# Patient Record
Sex: FEMALE | Race: WHITE | Employment: FULL TIME | URBAN - METROPOLITAN AREA
[De-identification: names, ages, dates, MRNs, and addresses within clinical notes are randomized per-mention and may not be internally consistent; named-entity substitution may affect disease eponyms.]

---

## 2017-02-27 LAB — TSH SERPL DL<=0.05 MIU/L-ACNC: 1.72 UIU/ML (ref 0.27–4.2)

## 2017-05-15 ENCOUNTER — HOSPITAL ENCOUNTER (OUTPATIENT)
Dept: OTHER | Age: 47
Discharge: HOME | End: 2017-05-15
Attending: INTERNAL MEDICINE | Admitting: INTERNAL MEDICINE

## 2017-05-15 DIAGNOSIS — R52 PAIN: ICD-10-CM

## 2017-05-22 ENCOUNTER — HOSPITAL ENCOUNTER (OUTPATIENT)
Dept: PHYSICAL THERAPY | Age: 47
Discharge: HOME | End: 2017-05-31
Admitting: INTERNAL MEDICINE

## 2017-05-22 ASSESSMENT — PAIN SCALES - GENERAL
PAINLEVEL_OUTOF10: 6
PAINLEVEL_OUTOF10: 6

## 2017-05-22 ASSESSMENT — PAIN DESCRIPTION - ONSET
ONSET: AWAKENED FROM SLEEP
ONSET: AWAKENED FROM SLEEP

## 2017-05-22 ASSESSMENT — PAIN DESCRIPTION - LOCATION
LOCATION: HEAD;SHOULDER;NECK;BACK
LOCATION: HEAD;SHOULDER;NECK;BACK

## 2017-05-22 ASSESSMENT — PAIN DESCRIPTION - PAIN TYPE
TYPE: CHRONIC PAIN
TYPE: CHRONIC PAIN

## 2017-05-22 ASSESSMENT — PAIN DESCRIPTION - DESCRIPTORS
DESCRIPTORS: ACHING;CONSTANT;SHOOTING
DESCRIPTORS: SHOOTING;CONSTANT;ACHING

## 2017-05-22 ASSESSMENT — PAIN DESCRIPTION - FREQUENCY
FREQUENCY: CONTINUOUS
FREQUENCY: CONTINUOUS

## 2017-05-22 ASSESSMENT — PAIN DESCRIPTION - PROGRESSION
CLINICAL_PROGRESSION: GRADUALLY IMPROVING
CLINICAL_PROGRESSION: GRADUALLY IMPROVING

## 2017-05-22 ASSESSMENT — PAIN DESCRIPTION - ORIENTATION
ORIENTATION: LEFT
ORIENTATION: LEFT

## 2019-04-08 ENCOUNTER — OFFICE VISIT (OUTPATIENT)
Dept: INTERNAL MEDICINE CLINIC | Age: 49
End: 2019-04-08
Payer: COMMERCIAL

## 2019-04-08 VITALS
SYSTOLIC BLOOD PRESSURE: 120 MMHG | DIASTOLIC BLOOD PRESSURE: 92 MMHG | BODY MASS INDEX: 25.01 KG/M2 | HEIGHT: 66 IN | WEIGHT: 155.6 LBS

## 2019-04-08 DIAGNOSIS — E55.9 VITAMIN D INSUFFICIENCY: ICD-10-CM

## 2019-04-08 DIAGNOSIS — Z00.00 WELL ADULT EXAM: ICD-10-CM

## 2019-04-08 DIAGNOSIS — G43.109 MIGRAINE WITH AURA AND WITHOUT STATUS MIGRAINOSUS, NOT INTRACTABLE: Primary | ICD-10-CM

## 2019-04-08 DIAGNOSIS — Z87.442 HISTORY OF KIDNEY STONES: ICD-10-CM

## 2019-04-08 DIAGNOSIS — J45.20 MILD INTERMITTENT ASTHMA WITHOUT COMPLICATION: ICD-10-CM

## 2019-04-08 DIAGNOSIS — Z12.83 SKIN CANCER SCREENING: ICD-10-CM

## 2019-04-08 DIAGNOSIS — K63.5 POLYP OF COLON, UNSPECIFIED PART OF COLON, UNSPECIFIED TYPE: ICD-10-CM

## 2019-04-08 DIAGNOSIS — J30.89 ENVIRONMENTAL AND SEASONAL ALLERGIES: ICD-10-CM

## 2019-04-08 LAB
BASOPHILS ABSOLUTE: 0 K/UL (ref 0–0.2)
BASOPHILS RELATIVE PERCENT: 0.5 %
EOSINOPHILS ABSOLUTE: 0.1 K/UL (ref 0–0.6)
EOSINOPHILS RELATIVE PERCENT: 1.4 %
HCT VFR BLD CALC: 44.2 % (ref 36–48)
HEMOGLOBIN: 14.4 G/DL (ref 12–16)
LYMPHOCYTES ABSOLUTE: 1.8 K/UL (ref 1–5.1)
LYMPHOCYTES RELATIVE PERCENT: 22.5 %
MCH RBC QN AUTO: 31.3 PG (ref 26–34)
MCHC RBC AUTO-ENTMCNC: 32.5 G/DL (ref 31–36)
MCV RBC AUTO: 96.3 FL (ref 80–100)
MONOCYTES ABSOLUTE: 0.4 K/UL (ref 0–1.3)
MONOCYTES RELATIVE PERCENT: 4.8 %
NEUTROPHILS ABSOLUTE: 5.5 K/UL (ref 1.7–7.7)
NEUTROPHILS RELATIVE PERCENT: 70.8 %
PDW BLD-RTO: 12.9 % (ref 12.4–15.4)
PLATELET # BLD: 319 K/UL (ref 135–450)
PMV BLD AUTO: 7.6 FL (ref 5–10.5)
RBC # BLD: 4.59 M/UL (ref 4–5.2)
WBC # BLD: 7.8 K/UL (ref 4–11)

## 2019-04-08 PROCEDURE — 99203 OFFICE O/P NEW LOW 30 MIN: CPT | Performed by: INTERNAL MEDICINE

## 2019-04-08 RX ORDER — ONDANSETRON 8 MG/1
8 TABLET, ORALLY DISINTEGRATING ORAL EVERY 8 HOURS PRN
Qty: 20 TABLET | Refills: 1 | Status: SHIPPED | OUTPATIENT
Start: 2019-04-08 | End: 2021-07-30 | Stop reason: SDUPTHER

## 2019-04-08 RX ORDER — IBUPROFEN 200 MG
200 TABLET ORAL EVERY 6 HOURS PRN
COMMUNITY

## 2019-04-08 RX ORDER — RIZATRIPTAN BENZOATE 5 MG/1
5 TABLET, ORALLY DISINTEGRATING ORAL
Qty: 30 TABLET | Refills: 3 | Status: SHIPPED | OUTPATIENT
Start: 2019-04-08 | End: 2020-08-19 | Stop reason: SDUPTHER

## 2019-04-08 RX ORDER — LEVOCETIRIZINE DIHYDROCHLORIDE 5 MG/1
5 TABLET, FILM COATED ORAL NIGHTLY
COMMUNITY

## 2019-04-08 ASSESSMENT — PATIENT HEALTH QUESTIONNAIRE - PHQ9
SUM OF ALL RESPONSES TO PHQ QUESTIONS 1-9: 0
SUM OF ALL RESPONSES TO PHQ9 QUESTIONS 1 & 2: 0
2. FEELING DOWN, DEPRESSED OR HOPELESS: 0
1. LITTLE INTEREST OR PLEASURE IN DOING THINGS: 0
SUM OF ALL RESPONSES TO PHQ QUESTIONS 1-9: 0

## 2019-04-08 ASSESSMENT — ENCOUNTER SYMPTOMS
DIARRHEA: 0
CONSTIPATION: 0
CHEST TIGHTNESS: 0
SHORTNESS OF BREATH: 0

## 2019-04-08 NOTE — ASSESSMENT & PLAN NOTE
Had improvement in her migraines with the use of propranolol but it did make her sleepy. She thinks she was on around 60 mg a day. Would be reluctant to use propranolol as she is getting allergy injections and has a history of asthma. She is currently getting chiropractic treatment which has helped her headaches. If she does not continue to improve, consider verapamil. Would avoid Topamax with history of kidney stones. Could also consider amitriptyline. May also benefit from referral to neurology to discuss possible Botox injections given the origin of her migraine seems to be cervical.  Trial of Maxalt MLT 5 mg p.r.n. and Zofran p.r.n. for nausea. Had side effects from Imitrex.

## 2019-04-08 NOTE — PATIENT INSTRUCTIONS
Trial of Riboflavin 50 mg at bedtime, Magnesium and calcium 400 mg, and Melatonin 3 mg at bedtime for prophylaxis

## 2019-04-08 NOTE — ASSESSMENT & PLAN NOTE
Has a Ventolin inhaler but uses very infrequently. Has not had asthma symptoms in \"a long time\". He needs to flare mildly with URIs.

## 2019-04-08 NOTE — PROGRESS NOTES
supple. Carotid bruit is not present. No thyroid mass and no thyromegaly present. Cardiovascular: Normal rate, regular rhythm, normal heart sounds and intact distal pulses. No murmur heard. Pulses:       Dorsalis pedis pulses are 2+ on the right side, and 2+ on the left side. No LE edema   Pulmonary/Chest: Effort normal. She has no wheezes. She has no rales. She exhibits no tenderness. Abdominal: Soft. Bowel sounds are normal. She exhibits no mass. There is no tenderness. Lymphadenopathy:     She has no cervical adenopathy. Neurological: She is alert and oriented to person, place, and time. Skin: Skin is warm and dry. No pallor. Psychiatric: She has a normal mood and affect. Her behavior is normal. Judgment and thought content normal.       ASSESSMENT/PLAN:    Problem List Items Addressed This Visit     Migraine with aura and without status migrainosus, not intractable - Primary     Had improvement in her migraines with the use of propranolol but it did make her sleepy. She thinks she was on around 60 mg a day. Would be reluctant to use propranolol as she is getting allergy injections and has a history of asthma. She is currently getting chiropractic treatment which has helped her headaches. If she does not continue to improve, consider verapamil. Would avoid Topamax with history of kidney stones. Could also consider amitriptyline. May also benefit from referral to neurology to discuss possible Botox injections given the origin of her migraine seems to be cervical.  Trial of Maxalt MLT 5 mg p.r.n. and Zofran p.r.n. for nausea. Had side effects from Imitrex.          Relevant Medications    Aspirin-Acetaminophen-Caffeine (EXCEDRIN PO)    ibuprofen (ADVIL;MOTRIN) 200 MG tablet    rizatriptan (MAXALT-MLT) 5 MG disintegrating tablet    ondansetron (ZOFRAN ODT) 8 MG TBDP disintegrating tablet    Environmental and seasonal allergies     Does follow with an allergist and gets monthly allergy

## 2019-04-09 LAB
A/G RATIO: 1.7 (ref 1.1–2.2)
ALBUMIN SERPL-MCNC: 4.6 G/DL (ref 3.4–5)
ALP BLD-CCNC: 49 U/L (ref 40–129)
ALT SERPL-CCNC: 20 U/L (ref 10–40)
ANION GAP SERPL CALCULATED.3IONS-SCNC: 12 MMOL/L (ref 3–16)
AST SERPL-CCNC: 16 U/L (ref 15–37)
BILIRUB SERPL-MCNC: <0.2 MG/DL (ref 0–1)
BUN BLDV-MCNC: 14 MG/DL (ref 7–20)
CALCIUM SERPL-MCNC: 9.5 MG/DL (ref 8.3–10.6)
CHLORIDE BLD-SCNC: 105 MMOL/L (ref 99–110)
CHOLESTEROL, TOTAL: 208 MG/DL (ref 0–199)
CO2: 25 MMOL/L (ref 21–32)
CREAT SERPL-MCNC: 0.8 MG/DL (ref 0.6–1.1)
GFR AFRICAN AMERICAN: >60
GFR NON-AFRICAN AMERICAN: >60
GLOBULIN: 2.7 G/DL
GLUCOSE BLD-MCNC: 93 MG/DL (ref 70–99)
HDLC SERPL-MCNC: 62 MG/DL (ref 40–60)
LDL CHOLESTEROL CALCULATED: 116 MG/DL
POTASSIUM SERPL-SCNC: 4.9 MMOL/L (ref 3.5–5.1)
SODIUM BLD-SCNC: 142 MMOL/L (ref 136–145)
TOTAL PROTEIN: 7.3 G/DL (ref 6.4–8.2)
TRIGL SERPL-MCNC: 152 MG/DL (ref 0–150)
TSH REFLEX: 1.76 UIU/ML (ref 0.27–4.2)
VITAMIN D 25-HYDROXY: 28.8 NG/ML
VLDLC SERPL CALC-MCNC: 30 MG/DL

## 2019-04-27 ENCOUNTER — HOSPITAL ENCOUNTER (OUTPATIENT)
Dept: WOMENS IMAGING | Age: 49
Discharge: HOME | End: 2019-04-27
Payer: COMMERCIAL

## 2019-04-27 DIAGNOSIS — Z12.39 BREAST CANCER SCREENING: ICD-10-CM

## 2019-04-27 PROCEDURE — 77063 BREAST TOMOSYNTHESIS BI: CPT

## 2019-04-29 ENCOUNTER — TELEPHONE (OUTPATIENT)
Dept: INTERNAL MEDICINE CLINIC | Age: 49
End: 2019-04-29

## 2019-04-29 ENCOUNTER — HOSPITAL ENCOUNTER (EMERGENCY)
Age: 49
Discharge: HOME | End: 2019-04-29
Payer: COMMERCIAL

## 2019-04-29 ENCOUNTER — APPOINTMENT (OUTPATIENT)
Dept: GENERAL RADIOLOGY | Age: 49
End: 2019-04-29
Payer: COMMERCIAL

## 2019-04-29 VITALS
DIASTOLIC BLOOD PRESSURE: 84 MMHG | TEMPERATURE: 98.1 F | HEART RATE: 89 BPM | SYSTOLIC BLOOD PRESSURE: 107 MMHG | RESPIRATION RATE: 16 BRPM | OXYGEN SATURATION: 97 %

## 2019-04-29 DIAGNOSIS — S93.402A SPRAIN OF LEFT ANKLE, UNSPECIFIED LIGAMENT, INITIAL ENCOUNTER: ICD-10-CM

## 2019-04-29 DIAGNOSIS — S80.02XA CONTUSION OF LEFT KNEE, INITIAL ENCOUNTER: ICD-10-CM

## 2019-04-29 DIAGNOSIS — R92.8 ABNORMAL MAMMOGRAM: Primary | ICD-10-CM
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
## 2019-04-29 PROCEDURE — 99283 EMERGENCY DEPT VISIT LOW MDM: CPT

## 2019-04-29 PROCEDURE — 6370000000 HC RX 637 (ALT 250 FOR IP): Performed by: PHYSICIAN ASSISTANT

## 2019-04-29 PROCEDURE — 73560 X-RAY EXAM OF KNEE 1 OR 2: CPT

## 2019-04-29 PROCEDURE — 73610 X-RAY EXAM OF ANKLE: CPT

## 2019-04-29 RX ORDER — NAPROXEN 250 MG/1
500 TABLET ORAL ONCE
Status: COMPLETED | OUTPATIENT
Start: 2019-04-29 | End: 2019-04-29

## 2019-04-29 RX ADMIN — NAPROXEN 500 MG: 250 TABLET ORAL at 08:02

## 2019-04-29 ASSESSMENT — ENCOUNTER SYMPTOMS
RESPIRATORY NEGATIVE: 1
VOMITING: 0
DIARRHEA: 0
COLOR CHANGE: 0
CONSTIPATION: 0
NAUSEA: 0
ABDOMINAL PAIN: 0
COUGH: 0
BACK PAIN: 0
SHORTNESS OF BREATH: 0

## 2019-04-29 ASSESSMENT — PAIN SCALES - GENERAL
PAINLEVEL_OUTOF10: 3
PAINLEVEL_OUTOF10: 3

## 2019-04-29 NOTE — ED NOTES
Patient reporting that she was walking out to garage, when she went down 2 steps and rolled left ankle, patient reporting that she heard a pop, and felt like she was going to vomit, then the pain kind of  down and applied ice to it, and swelling is down from ice. Patient medicated per MAR, and xrays of left ankle and left knee as ordered, will continue to monitor.       Paresh Singleton RN  [phone]

## 2019-04-29 NOTE — ED PROVIDER NOTES
of Systems   Constitutional: Negative for chills and fever. Respiratory: Negative. Negative for cough and shortness of breath. Cardiovascular: Negative. Negative for chest pain. Gastrointestinal: Negative for abdominal pain, constipation, diarrhea, nausea and vomiting. Genitourinary: Negative for difficulty urinating and dysuria. Musculoskeletal: Positive for arthralgias, joint swelling and myalgias. Negative for back pain, gait problem, neck pain and neck stiffness. Skin: Negative for color change, pallor, rash and wound. Neurological: Negative for dizziness, weakness, light-headedness and numbness. Positives and Pertinent negatives as per HPI. Except as noted abovein the ROS, all other systems were reviewed and negative.        PAST MEDICAL HISTORY     Past Medical History:   Diagnosis Date    Allergic rhinitis     Kidney stone     Migraine          SURGICAL HISTORY     Past Surgical History:   Procedure Laterality Date    HEMORRHOID SURGERY  2017    LITHOTRIPSY      x 2    OTHER SURGICAL HISTORY      anal fissure repair    PELVIC LAPAROSCOPY      endometriosis         CURRENTMEDICATIONS       Discharge Medication List as of 4/29/2019  9:02 AM      CONTINUE these medications which have NOT CHANGED    Details   levocetirizine (XYZAL) 5 MG tablet Take 5 mg by mouth nightlyHistorical Med      fluticasone (VERAMYST) 27.5 MCG/SPRAY nasal spray by Nasal route every morningHistorical Med      Levonorgest-Eth Estrad 91-Day (LEVONORGEST-ETH EST & ETH EST PO) Take by mouthHistorical Med      Aspirin-Acetaminophen-Caffeine (EXCEDRIN PO) Take by mouthHistorical Med      ibuprofen (ADVIL;MOTRIN) 200 MG tablet Take 200 mg by mouth every 6 hours as needed for PainHistorical Med      rizatriptan (MAXALT-MLT) 5 MG disintegrating tablet Take 1 tablet by mouth once as needed for Migraine May repeat in 2 hours if needed, Disp-30 tablet, R-3Normal      ondansetron (ZOFRAN ODT) 8 MG TBDP disintegrating tablet Place 1 tablet under the tongue every 8 hours as needed for Nausea or Vomiting, Disp-20 tablet, R-1Normal               ALLERGIES     Zithromax [azithromycin]    FAMILYHISTORY       Family History   Problem Relation Age of Onset    Diabetes Mother     High Blood Pressure Mother     High Blood Pressure Father     High Blood Pressure Maternal Grandfather     Diabetes Maternal Grandfather     Heart Disease Maternal Grandfather     Colon Cancer Paternal Grandmother         61    Heart Disease Maternal Cousin         pacemaker    Migraines Daughter           SOCIAL HISTORY       Social History     Socioeconomic History    Marital status:      Spouse name: None    Number of children: None    Years of education: None    Highest education level: None   Occupational History    None   Social Needs    Financial resource strain: None    Food insecurity:     Worry: None     Inability: None    Transportation needs:     Medical: None     Non-medical: None   Tobacco Use    Smoking status: Never Smoker    Smokeless tobacco: Never Used   Substance and Sexual Activity    Alcohol use: Yes     Comment: occasionallly    Drug use: Never    Sexual activity: None   Lifestyle    Physical activity:     Days per week: None     Minutes per session: None    Stress: None   Relationships    Social connections:     Talks on phone: None     Gets together: None     Attends Spiritism service: None     Active member of club or organization: None     Attends meetings of clubs or organizations: None     Relationship status: None    Intimate partner violence:     Fear of current or ex partner: None     Emotionally abused: None     Physically abused: None     Forced sexual activity: None   Other Topics Concern    None   Social History Narrative    None       SCREENINGS             PHYSICAL EXAM    (up to 7 for level 4, 8 or more for level 5)     ED Triage Vitals [04/29/19 0741]   BP Temp Temp Source Pulse Resp SpO2 Height Weight   103/71 97 °F (36.1 °C) Oral 80 16 97 % -- --       Physical Exam   Constitutional: She is oriented to person, place, and time. She appears well-developed and well-nourished. HENT:   Head: Normocephalic and atraumatic. Right Ear: External ear normal.   Left Ear: External ear normal.   Eyes: Right eye exhibits no discharge. Left eye exhibits no discharge. Neck: Normal range of motion. Neck supple. Pulmonary/Chest: Effort normal. No stridor. No respiratory distress. Musculoskeletal: Normal range of motion. Upon examination patient has what appears to be contusion to left anterior knee. Tenderness over the left anterior knee. Patellar tendon intact. Able to straight leg raise. No ligamentous or meniscal instability noted. No abnormal patellar glide/grind. No popliteal fossa tenderness. No ecchymosis, erythema or warmth. No abrasion or laceration. Proximal fibular tenderness. Tender to palpation over the left lateral anterior joint line. No lateral malleolus or medial malleolar tenderness. No Achilles tendon tenderness. There is tendon intact. Dorsalis pedis pulse and capillary refill brisk. Sensation intact to the medial and lateral aspects of distal toes. Full range of motion and strength. Gait deferred. Compartments soft. No edema, ecchymosis, erythema or warmth noted to the ankle. No abrasion or laceration. No rashes or lesions. Neurological: She is alert and oriented to person, place, and time. Skin: Skin is warm and dry. She is not diaphoretic. No erythema. No pallor. Psychiatric: She has a normal mood and affect. Her behavior is normal.       DIAGNOSTIC RESULTS   LABS:    Labs Reviewed - No data to display    All other labs were within normal range or not returned as of this dictation. EKG:  All EKG's are interpreted by the Emergency Department Physician who either signs orCo-signs this chart in the absence of a cardiologist.  Please see their note for interpretation of EKG. RADIOLOGY:   Non-plain film images such as CT, Ultrasound and MRI are read by the radiologist. Plain radiographic images are visualized andpreliminarily interpreted by the  ED Provider with the below findings:        Interpretation perthe Radiologist below, if available at the time of this note:    XR KNEE LEFT (1-2 VIEWS)   Final Result   No acute osseous abnormality appreciated of the left knee or left ankle. Follow-up can be obtained as clinically indicated. XR ANKLE LEFT (MIN 3 VIEWS)   Final Result   No acute osseous abnormality appreciated of the left knee or left ankle. Follow-up can be obtained as clinically indicated. No results found. PROCEDURES   Unless otherwise noted below, none     Procedures    CRITICAL CARE TIME   N/A    CONSULTS:  None      EMERGENCY DEPARTMENT COURSE and DIFFERENTIALDIAGNOSIS/MDM:   Vitals:    Vitals:    04/29/19 0741 04/29/19 0907   BP: 103/71 107/84   Pulse: 80 89   Resp: 16 16   Temp: 97 °F (36.1 °C) 98.1 °F (36.7 °C)   TempSrc: Oral Oral   SpO2: 97% 97%       Patient was given thefollowing medications:  Medications   naproxen (NAPROSYN) tablet 500 mg (500 mg Oral Given 4/29/19 0802)       Patient is a 26-year-old female who presents to the ED with complaint of a fall. Have follow with associated left ankle and left knee injury. Given Naprosyn here in the ED for symptom control. X-rays of the left knee and left ankle showed no acute abnormality. Given history and physical examination suffering from mechanical fall from trip/slip with associated contusion and ankle sprain. Given air cast.  Patient instructed to ice, elevate and rest the area as much as possible. Follow-up with orthopedics. Return ED for any worsening symptoms. Instructed on over-the-counter medication for symptom control at home.   Low solution for acute fracture, dislocation, septic arthritis, gout, DVT, arterial occlusion, tendon involvement, nerve involvement, vascular compromise, cellulitis, abscess, compartment syndrome, proximal fibular fracture, Lisfranc injury, Charcot foot, Achilles tendon rupture or other emergent etiology at this time. FINAL IMPRESSION      1. Fall from slip, trip, or stumble, initial encounter    2. Contusion of left knee, initial encounter    3.  Sprain of left ankle, unspecified ligament, initial encounter          DISPOSITION/PLAN   DISPOSITION Decision To Discharge 04/29/2019 08:59:17 AM      PATIENT REFERREDTO:  Luz Marina Salter DO  [address] [address]  [phone]    Go to   As needed, If symptoms worsen    [facility] Emergency Department  [address] [facility]  [phone]  Go to   As needed, If symptoms worsen    Joe Ford MD  [address] [place] [address]  [phone]    Schedule an appointment as soon as possible for a visit   As needed, If symptoms worsen      DISCHARGE MEDICATIONS:  Discharge Medication List as of 4/29/2019  9:02 AM          DISCONTINUED MEDICATIONS:  Discharge Medication List as of 4/29/2019  9:02 AM                 (Please note that portions ofthis note were completed with a voice recognition program.  Efforts were made to edit the dictations but occasionally words are mis-transcribed.)    MACARENA [place] (electronically signed)          MACARENA Suárez  04/30/19 6818

## 2019-04-29 NOTE — TELEPHONE ENCOUNTER
Called and reviewed her abn mammogram. Is scheduled to have [address] on Friday.  Order for [address] is already in [address] saw

## 2019-04-29 NOTE — ED NOTES
Air cast applied, and patient ambulatory at time of discharge.       Octavia Montes RN  04/29/19 6057

## 2019-05-03 ENCOUNTER — HOSPITAL ENCOUNTER (OUTPATIENT)
Dept: ULTRASOUND IMAGING | Age: 49
Discharge: HOME | End: 2019-05-03
Payer: COMMERCIAL

## 2019-05-03 DIAGNOSIS — R92.8 ABNORMAL FINDING ON MAMMOGRAPHY: ICD-10-CM

## 2019-05-03 PROCEDURE — 76642 ULTRASOUND BREAST LIMITED: CPT

## 2019-05-08 PROBLEM — Z12.83 SKIN CANCER SCREENING: Status: RESOLVED | Noted: 2019-04-08 | Resolved: 2019-05-08

## 2019-06-07 ENCOUNTER — OFFICE VISIT (OUTPATIENT)
Dept: INTERNAL MEDICINE CLINIC | Age: 49
End: 2019-06-07
Payer: COMMERCIAL

## 2019-06-07 VITALS
SYSTOLIC BLOOD PRESSURE: 146 MMHG | HEART RATE: 102 BPM | OXYGEN SATURATION: 99 % | WEIGHT: 153.6 LBS | DIASTOLIC BLOOD PRESSURE: 100 MMHG | BODY MASS INDEX: 25.17 KG/M2

## 2019-06-07 DIAGNOSIS — G43.919: ICD-10-CM

## 2019-06-07 DIAGNOSIS — G43.109 MIGRAINE WITH AURA AND WITHOUT STATUS MIGRAINOSUS, NOT INTRACTABLE: Primary | ICD-10-CM

## 2019-06-07 PROCEDURE — 99214 OFFICE O/P EST MOD 30 MIN: CPT | Performed by: INTERNAL MEDICINE

## 2019-06-07 PROCEDURE — 96372 THER/PROPH/DIAG INJ SC/IM: CPT | Performed by: INTERNAL MEDICINE

## 2019-06-07 RX ORDER — KETOROLAC TROMETHAMINE 30 MG/ML
60 INJECTION, SOLUTION INTRAMUSCULAR; INTRAVENOUS ONCE
Status: COMPLETED | OUTPATIENT
Start: 2019-06-07 | End: 2019-06-07

## 2019-06-07 RX ORDER — METHYLPREDNISOLONE ACETATE 80 MG/ML
80 INJECTION, SUSPENSION INTRA-ARTICULAR; INTRALESIONAL; INTRAMUSCULAR; SOFT TISSUE ONCE
Status: COMPLETED | OUTPATIENT
Start: 2019-06-07 | End: 2019-06-07

## 2019-06-07 RX ADMIN — METHYLPREDNISOLONE ACETATE 80 MG: 80 INJECTION, SUSPENSION INTRA-ARTICULAR; INTRALESIONAL; INTRAMUSCULAR; SOFT TISSUE at 16:49

## 2019-06-07 RX ADMIN — KETOROLAC TROMETHAMINE 60 MG: 30 INJECTION, SOLUTION INTRAMUSCULAR; INTRAVENOUS at 16:47

## 2019-06-07 NOTE — PROGRESS NOTES
Patient: Flori Young is a 51 y.o. female who presents today with the following Chief Complaint(s):  Chief Complaint   Patient presents with    Migraine     since Sunday        HPI      did drive her to her appointment today. Here today for migraine. Has been going on since Sunday. Had to leave work on Monday- lost her vision in her left visual field. Has had similar symptoms but never this severe. Is very tired, has not been sleeping. Has had to leave work early. Had been going to the chiropractor weekly and then every other week. Had to cancel her appointment last week d/t work (was due on Wednesday 5/29/19) but did see him Wednesday 6/5/19, adjusted well but only partially worked on her headache. Will be seeing him again next Wednesday. Sunday 6/2 woke up with HA- took Maxalt and added ice. Did help. Went back to sleep and woke up and HA was dull. May have needed 2 doses of Maxalt after 2 hours. Monday 6/3 woke up with a dull HA (typical). Started feeling \"funny\" going to work, developed tunnel vision and then had complete loss of left visual field that resolved in less than 1 hour. HA was the same sharp occipital pain. Did leave work ( picked her up), took Maxalt  and Zofran at home and used ice and went to sleep. Tuesday 6/4- woke up at 1:30 am with HA, not as bad. Adjusted her pillow, took Tylenol, peppermint oil, and ice. Took Maxalt at 2:30 AM and used more ice. Woke up at 6 am, showered and did go to work ([employer] [facility]). Did feel mentally slower, decreased concentration. Longer to complete her tasks. Drank a lot of water. Was in a meeting at 1 pm, started with slight migraine, tried drinking a Coke Zero. Was able to finish her work day. Went out to dinner with her  that night and there was a child crying at [facility] and that made her migraine worse, took Maxalt and ice when she got home.     Wednesday 6/5- woke up around 3 am, iced/Maxalt/peppermint oil. Noticed the thunderstorm and wondered if that was triggering her headaches. Woke up at 6 am, felt better with dull HA, mild nausea. Able to work. Adjusted by DC at 5:30 pm. By 9 pm, HA again with icing and Maxalt. Thursday 6/6- woke up with HA at 12:30 am- ice/peppermint oil; unable to sleep so took Maxalt around 1:20 am/ice; felt better by 5:45 am, kept lights low. Did go to a job fair in [place] [address] and no AC. Did start having HA and vision issues around 2:30. Got home around 4pm, took more Maxalt, ice, rest.     Friday 6/7- woke up 3:15 am - iced. Maxalt around 4 am. Went back to sleep. Stayed home today but has been working from home but on computer and phone. Just has a dull HA and decreased focus. Did see Dr. Kellie Cruz yesterday and had her stop her OCP's in case they were contributing her her migraines. Monday 6/3 went to work and had full blown loss of visual field. Has not had this since Monday but has noticed that her vision is a little more foggy (waxes and wanes) and near vision is more difficult. HA pain is either a sharp pain in her right occipital area. If she is not having the sharp pain, pain is dull, throbbing/pulsatoins in same occipital area and then will hurt behind her eyes. Maxalt will at least dull the pain, sometimes will take the pain away. No side effects with Maxalt. Does have some nausea but not with all of her headaches. HA are typically weekly, less severe, lasting 1-2 days. maxalt has been working well for her when she has these.      Allergies   Allergen Reactions    Zithromax [Azithromycin] Rash      Past Medical History:   Diagnosis Date    Allergic rhinitis     Kidney stone     Migraine       Past Surgical History:   Procedure Laterality Date    HEMORRHOID SURGERY  2017    LITHOTRIPSY      x 2    OTHER SURGICAL HISTORY      anal fissure repair    PELVIC LAPAROSCOPY      endometriosis      Social History     Socioeconomic History    Marital (ZOFRAN ODT) 8 MG TBDP disintegrating tablet Place 1 tablet under the tongue every 8 hours as needed for Nausea or Vomiting 20 tablet 1    Aspirin-Acetaminophen-Caffeine (EXCEDRIN PO) Take by mouth      ibuprofen (ADVIL;MOTRIN) 200 MG tablet Take 200 mg by mouth every 6 hours as needed for Pain      rizatriptan (MAXALT-MLT) 5 MG disintegrating tablet Take 1 tablet by mouth once as needed for Migraine May repeat in 2 hours if needed 30 tablet 3    Levonorgest-Eth Estrad 91-Day (LEVONORGEST-ETH EST & ETH EST PO) Take by mouth       No facility-administered medications prior to visit. Patient'spast medical history, surgical history, family history, medications,  and allergies  were all reviewed and updated as appropriate today. Review of Systems   Constitutional: Negative for fever. HENT: Negative for congestion, sinus pressure and sinus pain. Eyes: Positive for photophobia and visual disturbance. Gastrointestinal: Positive for nausea. Negative for constipation, diarrhea and vomiting. Musculoskeletal: Negative for gait problem, neck pain and neck stiffness. Neurological: Positive for headaches. Negative for syncope, speech difficulty, weakness, light-headedness and numbness. Psychiatric/Behavioral: Positive for confusion (slowing of thought). BP (!) 146/100   Pulse 102   Wt 153 lb 9.6 oz (69.7 kg)   SpO2 99%   BMI 25.17 kg/m²   Physical Exam   Constitutional: She is oriented to person, place, and time. She appears well-developed and well-nourished. She is cooperative. Non-toxic appearance. HENT:   Head: Normocephalic. Right Ear: Tympanic membrane, external ear and ear canal normal.   Left Ear: Tympanic membrane, external ear and ear canal normal.   Nose: Nose normal.   Mouth/Throat: Oropharynx is clear and moist and mucous membranes are normal.   Eyes: Pupils are equal, round, and reactive to light. Conjunctivae and EOM are normal.   Neck: Carotid bruit is not present.  No thyroid mass and no thyromegaly present. Cardiovascular: Normal rate, regular rhythm, normal heart sounds and intact distal pulses. No murmur heard. Pulses:       Dorsalis pedis pulses are 2+ on the right side, and 2+ on the left side. No LE edema   Pulmonary/Chest: Effort normal and breath sounds normal.   Lymphadenopathy:     She has no cervical adenopathy. Neurological: She is alert and oriented to person, place, and time. No cranial nerve deficit. She displays a negative Romberg sign. Coordination and gait normal.   Psychiatric: She has a normal mood and affect. ASSESSMENT/PLAN:    Problem List Items Addressed This Visit     Acute confusional migraine, refractory     Has had daily migraine x 6 days. Does get relief with Maxalt but then HA will recur several hours later raising concern for possible rebound component to migraine. Will give her Toradol 60 mg IM and DepoMedrol 80 mg IM in the office to try to stop the HA. Add Benadryl 25 mg at HS tonight to try to improve sleep. If HA recurs over weekend recommend going to ED if no relief with Maxalt as may benefit from IVF, Compazine tx. Relevant Medications    verapamil (CALAN SR) 120 MG extended release tablet    ketorolac (TORADOL) injection 60 mg (Completed)    Migraine with aura and without status migrainosus, not intractable - Primary     Patient does have weekly migraines lasting 1-2 days that have been responding well to Maxalt but has had 6 migraines in the past 6 days, responding to Maxalt but then recurring several hours later, rasing concern for rebound component to HA. Continue chiropractic care as adjustments seem to be helping. Discussed prophylactic treatment is indicated and discussed options:  Inderal worked well for her in the past but made her very sleepy, Topamax is contraindicated with her h/o kidney stones, she may have tried amitriptyline in the past (sounds familiar but is not sure); suggest trial of verapamil  mg QHS and consider Amivig. Will also refer to neurology. Relevant Medications    verapamil (CALAN SR) 120 MG extended release tablet    ketorolac (TORADOL) injection 60 mg (Completed)    Other Relevant Orders    Nany Kirk MD, Neurology, [facility]          Current Outpatient Medications   Medication Sig Dispense Refill    verapamil (CALAN SR) 120 MG extended release tablet Take 1 tablet by mouth daily 30 tablet 1    levocetirizine (XYZAL) 5 MG tablet Take 5 mg by mouth nightly      fluticasone (VERAMYST) 27.5 MCG/SPRAY nasal spray by Nasal route every morning      ondansetron (ZOFRAN ODT) 8 MG TBDP disintegrating tablet Place 1 tablet under the tongue every 8 hours as needed for Nausea or Vomiting 20 tablet 1    Aspirin-Acetaminophen-Caffeine (EXCEDRIN PO) Take by mouth      ibuprofen (ADVIL;MOTRIN) 200 MG tablet Take 200 mg by mouth every 6 hours as needed for Pain      rizatriptan (MAXALT-MLT) 5 MG disintegrating tablet Take 1 tablet by mouth once as needed for Migraine May repeat in 2 hours if needed 30 tablet 3     No current facility-administered medications for this visit. Return in about 3 weeks (around 6/28/2019).

## 2019-06-07 NOTE — LETTER
[facility] Internal Medicine  [address] [address]  Phone: [phone]  Fax: [phone]    [facility],         June 7, 2019     Patient: Isa Hernandez   YOB: 1970   Date of Visit: 6/7/2019       To Whom it May Concern:    Hilton Black was seen in my clinic on 6/7/2019. She may return to work on Kellie 10, 2019. If you have any questions or concerns, please don't hesitate to call.     Sincerely,         [facility], DO

## 2019-06-08 PROBLEM — G43.919: Status: ACTIVE | Noted: 2019-06-08

## 2019-06-08 ASSESSMENT — ENCOUNTER SYMPTOMS
SINUS PAIN: 0
SINUS PRESSURE: 0
VOMITING: 0
PHOTOPHOBIA: 1
DIARRHEA: 0
NAUSEA: 1
CONSTIPATION: 0

## 2019-06-08 NOTE — ASSESSMENT & PLAN NOTE
Has had daily migraine x 6 days. Does get relief with Maxalt but then HA will recur several hours later raising concern for possible rebound component to migraine. Will give her Toradol 60 mg IM and DepoMedrol 80 mg IM in the office to try to stop the HA. Add Benadryl 25 mg at HS tonight to try to improve sleep. If HA recurs over weekend recommend going to ED if no relief with Maxalt as may benefit from IVF, Compazine tx.

## 2019-06-08 NOTE — ASSESSMENT & PLAN NOTE
Patient does have weekly migraines lasting 1-2 days that have been responding well to Maxalt but has had 6 migraines in the past 6 days, responding to Maxalt but then recurring several hours later, rasing concern for rebound component to HA. Continue chiropractic care as adjustments seem to be helping. Discussed prophylactic treatment is indicated and discussed options: Inderal worked well for her in the past but made her very sleepy, Topamax is contraindicated with her h/o kidney stones, she may have tried amitriptyline in the past (sounds familiar but is not sure); suggest trial of verapamil  mg QHS and consider Amivig. Will also refer to neurology.

## 2019-06-11 ENCOUNTER — OFFICE VISIT (OUTPATIENT)
Dept: NEUROLOGY | Age: 49
End: 2019-06-11
Payer: COMMERCIAL

## 2019-06-11 VITALS
WEIGHT: 153 LBS | DIASTOLIC BLOOD PRESSURE: 101 MMHG | HEIGHT: 65 IN | BODY MASS INDEX: 25.49 KG/M2 | HEART RATE: 101 BPM | SYSTOLIC BLOOD PRESSURE: 147 MMHG

## 2019-06-11 DIAGNOSIS — G43.119 INTRACTABLE MIGRAINE WITH AURA WITHOUT STATUS MIGRAINOSUS: Primary | ICD-10-CM

## 2019-06-11 LAB
HPV COMMENT: NORMAL
HPV TYPE 16: NOT DETECTED
HPV TYPE 18: NOT DETECTED
HPVOH (OTHER TYPES): NOT DETECTED

## 2019-06-11 PROCEDURE — 99244 OFF/OP CNSLTJ NEW/EST MOD 40: CPT | Performed by: PSYCHIATRY & NEUROLOGY

## 2019-06-11 RX ORDER — AMITRIPTYLINE HYDROCHLORIDE 10 MG/1
TABLET, FILM COATED ORAL
Qty: 60 TABLET | Refills: 1 | Status: SHIPPED | OUTPATIENT
Start: 2019-06-11 | End: 2019-09-11 | Stop reason: SINTOL

## 2019-06-11 RX ORDER — OLOPATADINE HYDROCHLORIDE 1 MG/ML
1 SOLUTION/ DROPS OPHTHALMIC 2 TIMES DAILY PRN
COMMUNITY

## 2019-06-11 NOTE — PATIENT INSTRUCTIONS
Please call with any questions or concerns:   SSANJALI [facility]  @ [phone]. LAB RESULTS:  Please obtain any labs or diagnostic tests as discussed today. You should call the office to check the results. Please allow  3 to 7 days for us to get these results. MEDICATION LIST:  Please bring an accurate list of your medications to every visit. APPOINTMENT CONFIRMATION:  We will call you the day before your scheduled appointment to confirm. If we are unable to reach you, you MUST call back by the end of the day to confirm the appointment or we may be forced to cancel.

## 2019-06-11 NOTE — LETTER
[facility]  [address] [facility] [address]  Phone: [phone]  Fax: [address] [facility] [address], DO        June 11, 2019       Patient: Key Beltran   MR Number: [id number]   YOB: 1970   Date of Visit: 6/11/2019       Dear Dr. Michelle Sharp:    Thank you for the request for consultation for Kitty Torres to me for the evaluation of intractable headaches. . Below are the relevant portions of my assessment and plan of care. HISTORY OF PRESENT ILLNESS :    Kitty Torres is a 51 y.o. female who is referred by Dr. Michelle Sharp   History was obtained from patient  Patient was referred for evaluation of intractable headaches. Patient states that she had headaches when she was very young during school days but then they got better. However in the last few years they have come back and in the last few months they have been worse. Now she gets headaches almost once every week. They can last for several hours. Headaches are throbbing in nature. She has scintillating scotoma with severe headaches. There is a family history of headaches in her mother and grandmother  She had an episode of focal visual hemianopsia last week along with a scintillating scotoma when she had her severe headache. Bright light and loud noise make the headaches worse. No clear relieving factors. Patient was started on verapamil by her primary physician a few days ago. Patient has history of kidney stones.                                                REVIEW OF SYSTEMS    Constitutional:  []   Chills   [x]  Fatigue   []  Fevers   []  Malaise   []  Weight loss     [] Denies all of the above    Eyes:  []  Double vision   [x]  Blurry vision     [] Denies all of the above    Ears, nose, mouth, throat, and face:   [] Hearing loss    []   Hoarseness      []  Snoring    [x]  Tinnitus       [] Denies all of the above     Respiratory:   []  Cough    []  Shortness of breath [x] Denies all of the above     Cardiovascular:   []  Chest pain    []  Exertional chest pressure/discomfort           [] Palpitations    []  Syncope     [x] Denies all of the above    Gastrointestinal:   []  Abdominal pain   []  Constipation    []  Diarrhea    []   Dysphagia                      [x] Denies all of the above    Genitourinary:      []  Frequency   []  Hematuria     []  Urinary incontinence           [x] Denies all of the above     Hematologic/lymphatic:  []  Bleeding    [x]  Easy bruising   []  Anemia  [] Denies all of the above     Musculoskeletal:   [] Back pain       []  Myalgias    [x]  Neck pain           [] Denies all of the above    Neurological: As noted in HPI    Behavioral/Psych:   [] Anxiety    []  Depression     []  Mood swings     [x] Denies all of the above     Endocrine:   []  Temperature intolerance     [x] Fatigue      [] Denies all of the above     Allergic/Immunologic:   [] Hay fever    [x] Denies all of the above     Past Medical History:   Diagnosis Date    Allergic rhinitis     Kidney stone     Migraine      Family History   Problem Relation Age of Onset    Diabetes Mother     High Blood Pressure Mother     High Blood Pressure Father     High Blood Pressure Maternal Grandfather     Diabetes Maternal Grandfather     Heart Disease Maternal Grandfather     Colon Cancer Paternal Grandmother         61    Heart Disease Maternal Cousin         pacemaker    Migraines Daughter      Social History     Socioeconomic History    Marital status:      Spouse name: None    Number of children: None    Years of education: None    Highest education level: None   Occupational History    None   Social Needs    Financial resource strain: None    Food insecurity:     Worry: None     Inability: None    Transportation needs:     Medical: None     Non-medical: None   Tobacco Use    Smoking status: Never Smoker    Smokeless tobacco: Never Used   Substance and Sexual Activity

## 2019-06-11 NOTE — PROGRESS NOTES
NEUROLOGY CONSULTATION     Chief Complaint   Patient presents with    Migraine     Patient is here today to establish care for migraine headaches. Patient has a long hx of migraines. HISTORY OF PRESENT ILLNESS :    Lili Calzada is a 51 y.o. female who is referred by Dr. Apollo Clement   History was obtained from patient  Patient was referred for evaluation of intractable headaches. Patient states that she had headaches when she was very young during school days but then they got better. However in the last few years they have come back and in the last few months they have been worse. Now she gets headaches almost once every week. They can last for several hours. Headaches are throbbing in nature. She has scintillating scotoma with severe headaches. There is a family history of headaches in her mother and grandmother  She had an episode of focal visual hemianopsia last week along with a scintillating scotoma when she had her severe headache. Bright light and loud noise make the headaches worse. No clear relieving factors. Patient was started on verapamil by her primary physician a few days ago. Patient has history of kidney stones.                                                REVIEW OF SYSTEMS    Constitutional:  []   Chills   [x]  Fatigue   []  Fevers   []  Malaise   []  Weight loss     [] Denies all of the above    Eyes:  []  Double vision   [x]  Blurry vision     [] Denies all of the above    Ears, nose, mouth, throat, and face:   [] Hearing loss    []   Hoarseness      []  Snoring    [x]  Tinnitus       [] Denies all of the above     Respiratory:   []  Cough    []  Shortness of breath         [x] Denies all of the above     Cardiovascular:   []  Chest pain    []  Exertional chest pressure/discomfort           [] Palpitations    []  Syncope     [x] Denies all of the above    Gastrointestinal:   []  Abdominal pain   [] Constipation    []  Diarrhea    []   Dysphagia                      [x] Denies all of the above    Genitourinary:      []  Frequency   []  Hematuria     []  Urinary incontinence           [x] Denies all of the above     Hematologic/lymphatic:  []  Bleeding    [x]  Easy bruising   []  Anemia  [] Denies all of the above     Musculoskeletal:   [] Back pain       []  Myalgias    [x]  Neck pain           [] Denies all of the above    Neurological: As noted in HPI    Behavioral/Psych:   [] Anxiety    []  Depression     []  Mood swings     [x] Denies all of the above     Endocrine:   []  Temperature intolerance     [x] Fatigue      [] Denies all of the above     Allergic/Immunologic:   [] Hay fever    [x] Denies all of the above     Past Medical History:   Diagnosis Date    Allergic rhinitis     Kidney stone     Migraine      Family History   Problem Relation Age of Onset    Diabetes Mother     High Blood Pressure Mother     High Blood Pressure Father     High Blood Pressure Maternal Grandfather     Diabetes Maternal Grandfather     Heart Disease Maternal Grandfather     Colon Cancer Paternal Grandmother         61    Heart Disease Maternal Cousin         pacemaker    Migraines Daughter      Social History     Socioeconomic History    Marital status:      Spouse name: None    Number of children: None    Years of education: None    Highest education level: None   Occupational History    None   Social Needs    Financial resource strain: None    Food insecurity:     Worry: None     Inability: None    Transportation needs:     Medical: None     Non-medical: None   Tobacco Use    Smoking status: Never Smoker    Smokeless tobacco: Never Used   Substance and Sexual Activity    Alcohol use: Yes     Comment: occasionallly    Drug use: Never    Sexual activity: None   Lifestyle    Physical activity:     Days per week: None     Minutes per session: None    Stress: None   Relationships    Social connections:     Talks on phone: None     Gets together: None     Attends Jainism service: None     Active member of club or organization: None     Attends meetings of clubs or organizations: None     Relationship status: None    Intimate partner violence:     Fear of current or ex partner: None     Emotionally abused: None     Physically abused: None     Forced sexual activity: None   Other Topics Concern    None   Social History Narrative    None       PHYSICAL EXAMINATION:  BP (!) 147/101   Pulse 101   Ht 5' 5\" (1.651 m)   Wt 153 lb (69.4 kg)   BMI 25.46 kg/m²   Appearance: Well appearing, well nourished and in no distress  Mental Status Exam: Patient is alert, oriented to person, place and time. Recent and remote memory is normal  Fund of Knowledge is normal  Attention/concentration is normal.   Speech : No dysarthria  Language : No aphasia  Funduscopic Exam: sharp disc margins  Cranial Nerves:   II: Visual fields:  Full to confrontation  III: Pupils:  equal, round, reactive to light  III,IV,VI: Extra Ocular Movements are intact. No nystagmus  V: Facial sensation is intact to pin prick and light touch  VII: Facial strength and movements: intact and symmetric smile,cheek puffing and eyebrow elevation  VIII: Hearing:  Intact to finger rub bilaterally  IX: Palate  elevation is symmetric  XI: Shoulder shrug is intact  XII: Tongue movements are normal  Motor:  Muscle tone and bulk are normal.   Strength is symmetrical 5/5 in all four extremities. Sensory: Intact to light touch and  pin prick in all four extremities  Coordination:  Normal  Finger to Nose and Heel to Shin bilaterally    . Reflexes:  DTR +2 and symmetric bilaterally  Plantar response: Flexor bilaterally  Gait: Gait and station is normal. Patient can toe/ heel and tandem walk without difficulty  Romberg: negative  Vascular: No carotid bruit bilaterally        DATA:  LABS:  General Labs:    CBC:   Lab Results   Component Value Date    WBC 7.8 04/08/2019    RBC 4.59 04/08/2019    HGB 14.4 04/08/2019    HCT 44.2 04/08/2019    MCV 96.3 04/08/2019    MCH 31.3 04/08/2019    MCHC 32.5 04/08/2019    RDW 12.9 04/08/2019     04/08/2019    MPV 7.6 04/08/2019     BMP:    Lab Results   Component Value Date     04/08/2019    K 4.9 04/08/2019     04/08/2019    CO2 25 04/08/2019    BUN 14 04/08/2019    LABALBU 4.6 04/08/2019    CREATININE 0.8 04/08/2019    CALCIUM 9.5 04/08/2019    GFRAA >60 04/08/2019    LABGLOM >60 04/08/2019    GLUCOSE 93 04/08/2019       IMPRESSION :  Intractable migraine headache  Nonfocal neurological examination  History of kidney stones  Patient was unable to tolerate propranolol in the past      RECOMMENDATIONS :  Discussed with patient  We discussed about treatment options  Continue verapamil which has been started by her primary care physician  I will add low-dose amitriptyline  Side effects were discussed. Topamax was considered but would be relatively contraindicated because of her kidney stones. She will discuss this further with her urologist as well. If none of these measures work we can always consider calcitonin gene related peptide antibody drugs  I have recommended that she avoid chocolate and nitrite containing foods  I will see her back in 2 months        Please note a portion of this chart was generated using dragon dictation software. Although every effort was made to ensure the accuracy of this automated transcription, some errors in transcription may have occurred.

## 2019-06-24 ENCOUNTER — OFFICE VISIT (OUTPATIENT)
Dept: NEUROLOGY | Age: 49
End: 2019-06-24
Payer: COMMERCIAL

## 2019-06-24 VITALS
HEART RATE: 80 BPM | WEIGHT: 154 LBS | BODY MASS INDEX: 25.66 KG/M2 | SYSTOLIC BLOOD PRESSURE: 131 MMHG | DIASTOLIC BLOOD PRESSURE: 85 MMHG | HEIGHT: 65 IN

## 2019-06-24 DIAGNOSIS — G43.111 INTRACTABLE MIGRAINE WITH AURA WITH STATUS MIGRAINOSUS: Primary | ICD-10-CM

## 2019-06-24 PROCEDURE — 99214 OFFICE O/P EST MOD 30 MIN: CPT | Performed by: PSYCHIATRY & NEUROLOGY

## 2019-06-24 NOTE — PROGRESS NOTES
Elizabeth Xiong   Neurology followup    Subjective:   CC/HP  History was obtained from the patient. Patient has severe intractable migraine headaches. Interval history:   Patient was unable to tolerate the amitriptyline. She states that if she has significant dry mouth. It also has not been helping. Patient cannot take topiramate because of history of kidney stones. She was unable to tolerate propranolol. She is on calcium channel blockers without any help. Detailed history:  Patient states that she had headaches when she was very young during school days but then they got better. However in the last few years they have come back and in the last few months they have been worse. Now she gets headaches almost once every week. They can last for several hours. Headaches are throbbing in nature. She has scintillating scotoma with severe headaches. There is a family history of headaches in her mother and grandmother  She had an episode of focal visual hemianopsia last week along with a scintillating scotoma when she had her severe headache. Bright light and loud noise make the headaches worse. No clear relieving factors.       REVIEW OF SYSTEMS    Constitutional:  []   Chills   [x]  Fatigue   []  Fevers   []  Malaise   []  Weight loss     [] Denies all of the above    Respiratory:   []  Cough    []  Shortness of breath         [x] Denies all of the above     Cardiovascular:   []  Chest pain    []  Exertional chest pressure/discomfort           [] Palpitations    []  Syncope     [x] Denies all of the above        Past Medical History:   Diagnosis Date    Allergic rhinitis     Kidney stone     Migraine      Family History   Problem Relation Age of Onset    Diabetes Mother     High Blood Pressure Mother     High Blood Pressure Father     High Blood Pressure Maternal Grandfather     Diabetes Maternal Grandfather     Heart Disease Maternal Grandfather     Colon Cancer Paternal Grandmother         63 HCT 44.2 04/08/2019    MCV 96.3 04/08/2019    MCH 31.3 04/08/2019    MCHC 32.5 04/08/2019    RDW 12.9 04/08/2019     04/08/2019    MPV 7.6 04/08/2019     BMP:    Lab Results   Component Value Date     04/08/2019    K 4.9 04/08/2019     04/08/2019    CO2 25 04/08/2019    BUN 14 04/08/2019    LABALBU 4.6 04/08/2019    CREATININE 0.8 04/08/2019    CALCIUM 9.5 04/08/2019    GFRAA >60 04/08/2019    LABGLOM >60 04/08/2019    GLUCOSE 93 04/08/2019       Impression :  Intractable migraine headaches with status migrainosus  Patient has been tried on several prophylactic medications in the past which have not helped. She was unable to tolerate propranolol or amitriptyline. She cannot take Topamax because of the relative contraindication with kidney stones  Patient is currently on calcium channel blockers without any improvement  Patient is extremely frustrated because of the continued headaches    Plan :  Lengthy discussion with patient  Discussed about different medications for migraine  Talked about calcitonin gene related peptide antibody drugs  I will start her on Aimovig injections  Side effects were discussed. Patient had several questions about the different drugs and these were discussed  I have told her to stop the amitriptyline because of side effects  She will continue with the diet modifications  I will see her back in 3 months for follow-up  I will get a CT head to rule out any structural lesion  Time spent in the care of this patient today including lengthy discussions with patient  was 25 minutes          Please note a portion of  this chart was generated using dragon dictation software. Although every effort was made to ensure the accuracy of this automated transcription, some errors in transcription may have occurred.

## 2019-06-27 ENCOUNTER — TELEPHONE (OUTPATIENT)
Dept: INTERNAL MEDICINE CLINIC | Age: 49
End: 2019-06-27

## 2019-06-27 NOTE — TELEPHONE ENCOUNTER
Spoke with pt, I gave her the web site to put all of her information in and get her card for the copay card.

## 2019-07-07 ENCOUNTER — HOSPITAL ENCOUNTER (OUTPATIENT)
Dept: CT IMAGING | Age: 49
Discharge: HOME | End: 2019-07-07
Payer: COMMERCIAL

## 2019-07-07 DIAGNOSIS — G43.111 INTRACTABLE MIGRAINE WITH AURA WITH STATUS MIGRAINOSUS: ICD-10-CM

## 2019-07-07 PROCEDURE — 70450 CT HEAD/BRAIN W/O DYE: CPT

## 2019-08-13 DIAGNOSIS — G43.109 MIGRAINE WITH AURA AND WITHOUT STATUS MIGRAINOSUS, NOT INTRACTABLE: ICD-10-CM

## 2019-09-09 ENCOUNTER — HOSPITAL ENCOUNTER (OUTPATIENT)
Dept: CT IMAGING | Age: 49
Discharge: HOME | End: 2019-09-09
Payer: COMMERCIAL

## 2019-09-09 DIAGNOSIS — Z87.442 H/O URINARY STONE: ICD-10-CM

## 2019-09-09 DIAGNOSIS — R10.9 ABDOMINAL PAIN, UNSPECIFIED ABDOMINAL LOCATION: ICD-10-CM

## 2019-09-09 LAB — HCG(URINE) PREGNANCY TEST: NEGATIVE

## 2019-09-09 PROCEDURE — 74176 CT ABD & PELVIS W/O CONTRAST: CPT

## 2019-09-09 PROCEDURE — 84703 CHORIONIC GONADOTROPIN ASSAY: CPT

## 2019-09-11 ENCOUNTER — OFFICE VISIT (OUTPATIENT)
Dept: INTERNAL MEDICINE CLINIC | Age: 49
End: 2019-09-11
Payer: COMMERCIAL

## 2019-09-11 VITALS
BODY MASS INDEX: 25.89 KG/M2 | WEIGHT: 155.6 LBS | OXYGEN SATURATION: 98 % | SYSTOLIC BLOOD PRESSURE: 106 MMHG | DIASTOLIC BLOOD PRESSURE: 78 MMHG | HEART RATE: 100 BPM

## 2019-09-11 DIAGNOSIS — G43.109 MIGRAINE WITH AURA AND WITHOUT STATUS MIGRAINOSUS, NOT INTRACTABLE: ICD-10-CM

## 2019-09-11 DIAGNOSIS — R91.1 PULMONARY NODULE: ICD-10-CM

## 2019-09-11 DIAGNOSIS — Z00.00 WELL ADULT EXAM: Primary | ICD-10-CM

## 2019-09-11 PROCEDURE — 99213 OFFICE O/P EST LOW 20 MIN: CPT | Performed by: INTERNAL MEDICINE

## 2019-09-11 ASSESSMENT — ENCOUNTER SYMPTOMS
CHEST TIGHTNESS: 0
SHORTNESS OF BREATH: 0
CONSTIPATION: 0
DIARRHEA: 0

## 2019-09-11 NOTE — PROGRESS NOTES
file    Stress: Not on file   Relationships    Social connections:     Talks on phone: Not on file     Gets together: Not on file     Attends Islam service: Not on file     Active member of club or organization: Not on file     Attends meetings of clubs or organizations: Not on file     Relationship status: Not on file    Intimate partner violence:     Fear of current or ex partner: Not on file     Emotionally abused: Not on file     Physically abused: Not on file     Forced sexual activity: Not on file   Other Topics Concern    Not on file   Social History Narrative    Not on file     Family History   Problem Relation Age of Onset    Diabetes Mother     High Blood Pressure Mother     High Blood Pressure Father     High Blood Pressure Maternal Grandfather     Diabetes Maternal Grandfather     Heart Disease Maternal Grandfather     Colon Cancer Paternal Grandmother         61    Heart Disease Maternal Cousin         pacemaker    Migraines Daughter         Outpatient Medications Prior to Visit   Medication Sig Dispense Refill    verapamil (CALAN SR) 120 MG extended release tablet TAKE ONE TABLET BY MOUTH DAILY 90 tablet 3    Erenumab-aooe (AIMOVIG) 70 MG/ML SOAJ Inject 1 cc (70 mg) subcutaneously once every 30 days 4 pen 0    olopatadine (PATANOL) 0.1 % ophthalmic solution 1 drop 2 times daily      levocetirizine (XYZAL) 5 MG tablet Take 5 mg by mouth nightly      fluticasone (VERAMYST) 27.5 MCG/SPRAY nasal spray by Nasal route every morning      ondansetron (ZOFRAN ODT) 8 MG TBDP disintegrating tablet Place 1 tablet under the tongue every 8 hours as needed for Nausea or Vomiting 20 tablet 1    amitriptyline (ELAVIL) 10 MG tablet Take 2 tablets at night 60 tablet 1    Aspirin-Acetaminophen-Caffeine (EXCEDRIN PO) Take by mouth      ibuprofen (ADVIL;MOTRIN) 200 MG tablet Take 200 mg by mouth every 6 hours as needed for Pain      rizatriptan (MAXALT-MLT) 5 MG disintegrating tablet Take 1

## 2019-10-04 ENCOUNTER — OFFICE VISIT (OUTPATIENT)
Dept: NEUROLOGY | Age: 49
End: 2019-10-04
Payer: COMMERCIAL

## 2019-10-04 VITALS
SYSTOLIC BLOOD PRESSURE: 105 MMHG | WEIGHT: 152 LBS | DIASTOLIC BLOOD PRESSURE: 69 MMHG | HEART RATE: 80 BPM | BODY MASS INDEX: 25.29 KG/M2

## 2019-10-04 DIAGNOSIS — G43.909 NONINTRACTABLE CHRONIC MIGRAINE: Primary | ICD-10-CM

## 2019-10-04 PROCEDURE — 99213 OFFICE O/P EST LOW 20 MIN: CPT | Performed by: PSYCHIATRY & NEUROLOGY

## 2019-11-22 ENCOUNTER — OFFICE VISIT (OUTPATIENT)
Dept: INTERNAL MEDICINE CLINIC | Age: 49
End: 2019-11-22
Payer: COMMERCIAL

## 2019-11-22 VITALS
OXYGEN SATURATION: 98 % | DIASTOLIC BLOOD PRESSURE: 80 MMHG | SYSTOLIC BLOOD PRESSURE: 120 MMHG | HEART RATE: 84 BPM | BODY MASS INDEX: 25.79 KG/M2 | WEIGHT: 155 LBS

## 2019-11-22 DIAGNOSIS — H61.21 IMPACTED CERUMEN OF RIGHT EAR: Primary | ICD-10-CM

## 2019-11-22 PROCEDURE — 69210 REMOVE IMPACTED EAR WAX UNI: CPT | Performed by: NURSE PRACTITIONER

## 2019-11-22 PROCEDURE — 99213 OFFICE O/P EST LOW 20 MIN: CPT | Performed by: NURSE PRACTITIONER

## 2020-01-07 ENCOUNTER — OFFICE VISIT (OUTPATIENT)
Dept: NEUROLOGY | Age: 50
End: 2020-01-07
Payer: COMMERCIAL

## 2020-01-07 VITALS
BODY MASS INDEX: 25.66 KG/M2 | WEIGHT: 154 LBS | HEART RATE: 75 BPM | DIASTOLIC BLOOD PRESSURE: 74 MMHG | HEIGHT: 65 IN | SYSTOLIC BLOOD PRESSURE: 115 MMHG

## 2020-01-07 PROCEDURE — 99213 OFFICE O/P EST LOW 20 MIN: CPT | Performed by: PSYCHIATRY & NEUROLOGY

## 2020-01-07 NOTE — PATIENT INSTRUCTIONS
Please call with any questions or concerns:   SSANJALI [facility]  @ [phone]. LAB RESULTS:  Please obtain any labs or diagnostic tests as discussed today. You should call the office to check the results. Please allow  3 to 7 days for us to get these results. MEDICATION LIST:  Please bring an accurate list of your medications to every visit. APPOINTMENT CONFIRMATION:  We will call you the day before your scheduled appointment to confirm. If we are unable to reach you, you MUST call back by the end of the day to confirm the appointment or we may be forced to cancel.

## 2020-04-17 ENCOUNTER — VIRTUAL VISIT (OUTPATIENT)
Dept: NEUROLOGY | Age: 50
End: 2020-04-17
Payer: COMMERCIAL

## 2020-04-17 VITALS — BODY MASS INDEX: 25.66 KG/M2 | HEIGHT: 65 IN | WEIGHT: 154 LBS

## 2020-04-17 PROCEDURE — 99213 OFFICE O/P EST LOW 20 MIN: CPT | Performed by: PSYCHIATRY & NEUROLOGY

## 2020-04-17 RX ORDER — ERENUMAB-AOOE 70 MG/ML
INJECTION SUBCUTANEOUS
Qty: 4 PEN | Refills: 0 | Status: SHIPPED | OUTPATIENT
Start: 2020-04-17 | End: 2020-09-18

## 2020-04-17 NOTE — PROGRESS NOTES
Maternal Grandfather     Heart Disease Maternal Grandfather     Colon Cancer Paternal Grandmother         61    Heart Disease Maternal Cousin         pacemaker    Migraines Daughter      Social History     Socioeconomic History    Marital status:      Spouse name: Not on file    Number of children: Not on file    Years of education: Not on file    Highest education level: Not on file   Occupational History    Not on file   Social Needs    Financial resource strain: Not on file    Food insecurity     Worry: Not on file     Inability: Not on file    Transportation needs     Medical: Not on file     Non-medical: Not on file   Tobacco Use    Smoking status: Never Smoker    Smokeless tobacco: Never Used   Substance and Sexual Activity    Alcohol use: Yes     Comment: occasionallly    Drug use: Never    Sexual activity: Not on file   Lifestyle    Physical activity     Days per week: Not on file     Minutes per session: Not on file    Stress: Not on file   Relationships    Social connections     Talks on phone: Not on file     Gets together: Not on file     Attends Denominational service: Not on file     Active member of club or organization: Not on file     Attends meetings of clubs or organizations: Not on file     Relationship status: Not on file    Intimate partner violence     Fear of current or ex partner: Not on file     Emotionally abused: Not on file     Physically abused: Not on file     Forced sexual activity: Not on file   Other Topics Concern    Not on file   Social History Narrative    Not on file        Objective:  Exam:  There were no vitals taken for this visit. This is a well-nourished patient in no acute distress  Awake and alert. Oriented x3. Speech normal.  Face symmetrical.  Tongue midline. Moves all 4 extremities well.     Data :  LABS:  General Labs:    CBC:   Lab Results   Component Value Date    WBC 7.8 04/08/2019    RBC 4.59 04/08/2019    HGB 14.4 04/08/2019    HCT

## 2020-05-27 ENCOUNTER — TELEPHONE (OUTPATIENT)
Dept: NEUROLOGY | Age: 50
End: 2020-05-27

## 2020-08-19 ENCOUNTER — VIRTUAL VISIT (OUTPATIENT)
Dept: NEUROLOGY | Age: 50
End: 2020-08-19
Payer: COMMERCIAL

## 2020-08-19 PROCEDURE — 99213 OFFICE O/P EST LOW 20 MIN: CPT | Performed by: PSYCHIATRY & NEUROLOGY

## 2020-08-19 RX ORDER — RIZATRIPTAN BENZOATE 5 MG/1
TABLET, ORALLY DISINTEGRATING ORAL
Qty: 9 TABLET | Refills: 0 | Status: SHIPPED | OUTPATIENT
Start: 2020-08-19 | End: 2021-02-12

## 2020-08-19 NOTE — PROGRESS NOTES
 High Blood Pressure Maternal Grandfather     Diabetes Maternal Grandfather     Heart Disease Maternal Grandfather     Colon Cancer Paternal Grandmother         61    Heart Disease Maternal Cousin         pacemaker    Migraines Daughter      Social History     Socioeconomic History    Marital status:      Spouse name: None    Number of children: None    Years of education: None    Highest education level: None   Occupational History    None   Social Needs    Financial resource strain: None    Food insecurity     Worry: None     Inability: None    Transportation needs     Medical: None     Non-medical: None   Tobacco Use    Smoking status: Never Smoker    Smokeless tobacco: Never Used   Substance and Sexual Activity    Alcohol use: Yes     Comment: occasionallly    Drug use: Never    Sexual activity: None   Lifestyle    Physical activity     Days per week: None     Minutes per session: None    Stress: None   Relationships    Social connections     Talks on phone: None     Gets together: None     Attends Oriental orthodox service: None     Active member of club or organization: None     Attends meetings of clubs or organizations: None     Relationship status: None    Intimate partner violence     Fear of current or ex partner: None     Emotionally abused: None     Physically abused: None     Forced sexual activity: None   Other Topics Concern    None   Social History Narrative    None        Objective:  Exam:  There were no vitals taken for this visit. This is a well-nourished patient in no acute distress  Awake and alert. Oriented x3. Speech normal.  Face symmetrical.  Tongue midline. Moves all 4 extremities well.     Data :  LABS:  General Labs:    CBC:   Lab Results   Component Value Date    WBC 7.8 04/08/2019    RBC 4.59 04/08/2019    HGB 14.4 04/08/2019    HCT 44.2 04/08/2019    MCV 96.3 04/08/2019    MCH 31.3 04/08/2019    MCHC 32.5 04/08/2019    RDW 12.9 04/08/2019     04/08/2019    MPV 7.6 04/08/2019     BMP:    Lab Results   Component Value Date     04/08/2019    K 4.9 04/08/2019     04/08/2019    CO2 25 04/08/2019    BUN 14 04/08/2019    LABALBU 4.6 04/08/2019    CREATININE 0.8 04/08/2019    CALCIUM 9.5 04/08/2019    GFRAA >60 04/08/2019    LABGLOM >60 04/08/2019    GLUCOSE 93 04/08/2019       Impression :  Intractable migraines, now doing better on Aimovig  She takes occasional Maxalt  She is also on verapamil from her primary care physician    Plan :  Sherrie Blackwood  Return in 6 months        Please note a portion of  this chart was generated using dragon dictation software. Although every effort was made to ensure the accuracy of this automated transcription, some errors in transcription may have occurred. Pursuant to the emergency declaration under the [facility], [facility] waiver authority and the [employer] Act, this Virtual  Visit was conducted, with patient's consent, to reduce the patient's risk of exposure to COVID-19 and provide continuity of care for an established patient. Services were provided through a video synchronous discussion virtually to substitute for in-person clinic visit.

## 2020-09-18 RX ORDER — ERENUMAB-AOOE 70 MG/ML
INJECTION SUBCUTANEOUS
Qty: 1 PEN | Refills: 3 | Status: SHIPPED | OUTPATIENT
Start: 2020-09-18 | End: 2021-01-27 | Stop reason: SDUPTHER

## 2020-09-25 ENCOUNTER — OFFICE VISIT (OUTPATIENT)
Dept: INTERNAL MEDICINE CLINIC | Age: 50
End: 2020-09-25
Payer: COMMERCIAL

## 2020-09-25 VITALS
OXYGEN SATURATION: 98 % | DIASTOLIC BLOOD PRESSURE: 78 MMHG | WEIGHT: 156 LBS | HEART RATE: 82 BPM | SYSTOLIC BLOOD PRESSURE: 118 MMHG | BODY MASS INDEX: 25.96 KG/M2 | TEMPERATURE: 97.1 F

## 2020-09-25 DIAGNOSIS — Z00.00 WELL ADULT EXAM: ICD-10-CM

## 2020-09-25 LAB
A/G RATIO: 2.3 (ref 1.1–2.2)
ALBUMIN SERPL-MCNC: 4.8 G/DL (ref 3.4–5)
ALP BLD-CCNC: 69 U/L (ref 40–129)
ALT SERPL-CCNC: 18 U/L (ref 10–40)
ANION GAP SERPL CALCULATED.3IONS-SCNC: 12 MMOL/L (ref 3–16)
AST SERPL-CCNC: 16 U/L (ref 15–37)
BASOPHILS ABSOLUTE: 0 K/UL (ref 0–0.2)
BASOPHILS RELATIVE PERCENT: 0.5 %
BILIRUB SERPL-MCNC: 0.5 MG/DL (ref 0–1)
BUN BLDV-MCNC: 15 MG/DL (ref 7–20)
CALCIUM SERPL-MCNC: 9.4 MG/DL (ref 8.3–10.6)
CHLORIDE BLD-SCNC: 106 MMOL/L (ref 99–110)
CHOLESTEROL, TOTAL: 187 MG/DL (ref 0–199)
CO2: 25 MMOL/L (ref 21–32)
CREAT SERPL-MCNC: 0.8 MG/DL (ref 0.6–1.1)
EOSINOPHILS ABSOLUTE: 0.1 K/UL (ref 0–0.6)
EOSINOPHILS RELATIVE PERCENT: 1.5 %
GFR AFRICAN AMERICAN: >60
GFR NON-AFRICAN AMERICAN: >60
GLOBULIN: 2.1 G/DL
GLUCOSE BLD-MCNC: 99 MG/DL (ref 70–99)
HCT VFR BLD CALC: 43 % (ref 36–48)
HDLC SERPL-MCNC: 63 MG/DL (ref 40–60)
HEMOGLOBIN: 14.3 G/DL (ref 12–16)
LDL CHOLESTEROL CALCULATED: 112 MG/DL
LYMPHOCYTES ABSOLUTE: 1.5 K/UL (ref 1–5.1)
LYMPHOCYTES RELATIVE PERCENT: 21.6 %
MCH RBC QN AUTO: 31.5 PG (ref 26–34)
MCHC RBC AUTO-ENTMCNC: 33.3 G/DL (ref 31–36)
MCV RBC AUTO: 94.7 FL (ref 80–100)
MONOCYTES ABSOLUTE: 0.5 K/UL (ref 0–1.3)
MONOCYTES RELATIVE PERCENT: 7.7 %
NEUTROPHILS ABSOLUTE: 4.6 K/UL (ref 1.7–7.7)
NEUTROPHILS RELATIVE PERCENT: 68.7 %
PDW BLD-RTO: 12.4 % (ref 12.4–15.4)
PLATELET # BLD: 276 K/UL (ref 135–450)
PMV BLD AUTO: 7.6 FL (ref 5–10.5)
POTASSIUM SERPL-SCNC: 4.3 MMOL/L (ref 3.5–5.1)
RBC # BLD: 4.55 M/UL (ref 4–5.2)
SODIUM BLD-SCNC: 143 MMOL/L (ref 136–145)
TOTAL PROTEIN: 6.9 G/DL (ref 6.4–8.2)
TRIGL SERPL-MCNC: 62 MG/DL (ref 0–150)
TSH REFLEX: 1.81 UIU/ML (ref 0.27–4.2)
VITAMIN D 25-HYDROXY: 28.8 NG/ML
VLDLC SERPL CALC-MCNC: 12 MG/DL
WBC # BLD: 6.7 K/UL (ref 4–11)

## 2020-09-25 PROCEDURE — 90686 IIV4 VACC NO PRSV 0.5 ML IM: CPT | Performed by: INTERNAL MEDICINE

## 2020-09-25 PROCEDURE — 99396 PREV VISIT EST AGE 40-64: CPT | Performed by: INTERNAL MEDICINE

## 2020-09-25 PROCEDURE — 90471 IMMUNIZATION ADMIN: CPT | Performed by: INTERNAL MEDICINE

## 2020-09-25 RX ORDER — ALBUTEROL SULFATE 90 UG/1
2 AEROSOL, METERED RESPIRATORY (INHALATION) EVERY 6 HOURS PRN
Qty: 1 INHALER | Refills: 1 | Status: SHIPPED | OUTPATIENT
Start: 2020-09-25 | End: 2021-12-07

## 2020-09-25 ASSESSMENT — PATIENT HEALTH QUESTIONNAIRE - PHQ9
SUM OF ALL RESPONSES TO PHQ9 QUESTIONS 1 & 2: 0
SUM OF ALL RESPONSES TO PHQ QUESTIONS 1-9: 0
1. LITTLE INTEREST OR PLEASURE IN DOING THINGS: 0
2. FEELING DOWN, DEPRESSED OR HOPELESS: 0
SUM OF ALL RESPONSES TO PHQ QUESTIONS 1-9: 0

## 2020-09-25 ASSESSMENT — ENCOUNTER SYMPTOMS
CONSTIPATION: 0
DIARRHEA: 0
CHEST TIGHTNESS: 0
SHORTNESS OF BREATH: 0

## 2020-09-25 NOTE — ASSESSMENT & PLAN NOTE
Continues with significant improvement in migraines with monthly Aimovig and verapamil  mg daily. She continues to follow with neurology. She estimates she has about 1 migraine per month.

## 2020-09-25 NOTE — ASSESSMENT & PLAN NOTE
Patient with 3 mm right lower lobe nodule seen on CT of the abdomen and pelvis in September 2019. She is a lifelong non-smoker. Follow-up CT chest.  If no additional nodule seen, no further work-up is required.

## 2020-09-25 NOTE — ASSESSMENT & PLAN NOTE
No problems identified on exam.  Follows with Dr. Ian Guajardo for GYN care. Is up-to-date on colonoscopies with history of colon polyps dating back to her 25s. She is overdue for mammogram which is delayed due to [place]. Order for mammogram given today. Referred to dermatology for skin cancer screening. Check fasting blood work.

## 2020-09-25 NOTE — PROGRESS NOTES
activity: Not on file   Lifestyle    Physical activity     Days per week: Not on file     Minutes per session: Not on file    Stress: Not on file   Relationships    Social connections     Talks on phone: Not on file     Gets together: Not on file     Attends [employer] service: Not on file     Active member of club or organization: Not on file     Attends meetings of clubs or organizations: Not on file     Relationship status: Not on file    Intimate partner violence     Fear of current or ex partner: Not on file     Emotionally abused: Not on file     Physically abused: Not on file     Forced sexual activity: Not on file   Other Topics Concern    Not on file   Social History Narrative    Not on file     Family History   Problem Relation Age of Onset    Diabetes Mother     High Blood Pressure Mother     High Blood Pressure Father     High Blood Pressure Maternal Grandfather     Diabetes Maternal Grandfather     Heart Disease Maternal Grandfather     Colon Cancer Paternal Grandmother         61    Heart Disease Maternal Cousin         pacemaker    Migraines Daughter         Outpatient Medications Prior to Visit   Medication Sig Dispense Refill    AIMOVIG 70 MG/ML SOAJ INJECT 1 CC (70 MG) SUBCUTANEOUSLY ONCE EVERY 30 DAYS 1 pen 3    rizatriptan (MAXALT-MLT) 5 MG disintegrating tablet Take 1 tablet at the onset of a severe migraine headache.  9 tablet 0    olopatadine (PATANOL) 0.1 % ophthalmic solution 1 drop 2 times daily as needed       levocetirizine (XYZAL) 5 MG tablet Take 5 mg by mouth nightly      fluticasone (VERAMYST) 27.5 MCG/SPRAY nasal spray by Nasal route every morning      ibuprofen (ADVIL;MOTRIN) 200 MG tablet Take 200 mg by mouth every 6 hours as needed for Pain      ondansetron (ZOFRAN ODT) 8 MG TBDP disintegrating tablet Place 1 tablet under the tongue every 8 hours as needed for Nausea or Vomiting 20 tablet 1    verapamil (CALAN SR) 120 MG extended release tablet TAKE ONE TABLET BY MOUTH DAILY 30 tablet 2    Aspirin-Acetaminophen-Caffeine (EXCEDRIN PO) Take by mouth       No facility-administered medications prior to visit. Patient'spast medical history, surgical history, family history, medications,  and allergies  were all reviewed and updated as appropriate today. Review of Systems   Constitutional: Negative for appetite change, fatigue and fever. Respiratory: Negative for chest tightness and shortness of breath. Cardiovascular: Negative for chest pain. Gastrointestinal: Negative for constipation and diarrhea. Skin: Negative for rash. /78   Pulse 82   Temp 97.1 °F (36.2 °C)   Wt 156 lb (70.8 kg)   SpO2 98%   BMI 25.96 kg/m²   Physical Exam  Vitals signs and nursing note reviewed. Constitutional:       Appearance: She is well-developed. She is not toxic-appearing. HENT:      Head: Normocephalic. Right Ear: Tympanic membrane, ear canal and external ear normal.      Left Ear: Tympanic membrane, ear canal and external ear normal.   Eyes:      General: No scleral icterus. Extraocular Movements: Extraocular movements intact. Conjunctiva/sclera: Conjunctivae normal.      Pupils: Pupils are equal, round, and reactive to light. Neck:      Thyroid: No thyroid mass or thyromegaly. Vascular: No carotid bruit. Cardiovascular:      Rate and Rhythm: Normal rate and regular rhythm. Pulses:           Dorsalis pedis pulses are 2+ on the right side and 2+ on the left side. Heart sounds: Normal heart sounds. No murmur. Comments: No LE edema  Pulmonary:      Effort: Pulmonary effort is normal.      Breath sounds: Normal breath sounds. Abdominal:      Palpations: Abdomen is soft. Tenderness: There is no abdominal tenderness. Lymphadenopathy:      Cervical: No cervical adenopathy. Neurological:      Mental Status: She is alert and oriented to person, place, and time. Cranial Nerves: No cranial nerve deficit. Psychiatric:         Mood and Affect: Mood normal.         Behavior: Behavior normal. Behavior is cooperative. ASSESSMENT/PLAN:    Problem List Items Addressed This Visit     Migraine with aura and without status migrainosus, not intractable     Continues with significant improvement in migraines with monthly Aimovig and verapamil  mg daily. She continues to follow with neurology. She estimates she has about 1 migraine per month. Relevant Medications    verapamil (CALAN SR) 120 MG extended release tablet    Mild intermittent asthma without complication     Add albuterol as needed. Recommend trial of albuterol 15 minutes prior to blocks to see if that makes a difference with regards to her breathing. Relevant Medications    albuterol sulfate HFA (VENTOLIN HFA) 108 (90 Base) MCG/ACT inhaler    Pulmonary nodule     Patient with 3 mm right lower lobe nodule seen on CT of the abdomen and pelvis in September 2019. She is a lifelong non-smoker. Follow-up CT chest.  If no additional nodule seen, no further work-up is required. Relevant Orders    CT CHEST WO CONTRAST    Well adult exam - Primary     No problems identified on exam.  Follows with Dr. Hair Hodge for GYN care. Is up-to-date on colonoscopies with history of colon polyps dating back to her 25s. She is overdue for mammogram which is delayed due to [place]. Order for mammogram given today. Referred to dermatology for skin cancer screening. Check fasting blood work.          Relevant Orders    CBC Auto Differential    Comprehensive Metabolic Panel    Lipid Panel    TSH with Reflex    Vitamin D 25 Hydroxy      Other Visit Diagnoses     Need for influenza vaccination        Relevant Medications    albuterol sulfate HFA (VENTOLIN HFA) 108 (90 Base) MCG/ACT inhaler    Other Relevant Orders    INFLUENZA, QUADV, 3 YRS AND OLDER, IM PF, PREFILL SYR OR SDV, 0.5ML (AFLURIA QUADV, PF)    Screening for breast cancer        Relevant Orders    STIVEN CARMINE DIGITAL SCREEN BILATERAL    Skin cancer screening        Relevant Orders    Ayse Navarrete MD, Dermatology, [facility]          Current Outpatient Medications   Medication Sig Dispense Refill    verapamil (CALAN SR) 120 MG extended release tablet Take 1 tablet by mouth nightly 90 tablet 3    albuterol sulfate HFA (VENTOLIN HFA) 108 (90 Base) MCG/ACT inhaler Inhale 2 puffs into the lungs every 6 hours as needed for Wheezing 1 Inhaler 1    AIMOVIG 70 MG/ML SOAJ INJECT 1 CC (70 MG) SUBCUTANEOUSLY ONCE EVERY 30 DAYS 1 pen 3    rizatriptan (MAXALT-MLT) 5 MG disintegrating tablet Take 1 tablet at the onset of a severe migraine headache. 9 tablet 0    olopatadine (PATANOL) 0.1 % ophthalmic solution 1 drop 2 times daily as needed       levocetirizine (XYZAL) 5 MG tablet Take 5 mg by mouth nightly      fluticasone (VERAMYST) 27.5 MCG/SPRAY nasal spray by Nasal route every morning      ibuprofen (ADVIL;MOTRIN) 200 MG tablet Take 200 mg by mouth every 6 hours as needed for Pain      ondansetron (ZOFRAN ODT) 8 MG TBDP disintegrating tablet Place 1 tablet under the tongue every 8 hours as needed for Nausea or Vomiting 20 tablet 1    Aspirin-Acetaminophen-Caffeine (EXCEDRIN PO) Take by mouth       No current facility-administered medications for this visit. Return in about 1 year (around 9/25/2021).

## 2020-09-25 NOTE — ASSESSMENT & PLAN NOTE
Add albuterol as needed. Recommend trial of albuterol 15 minutes prior to blocks to see if that makes a difference with regards to her breathing.

## 2020-10-25 PROBLEM — Z00.00 WELL ADULT EXAM: Status: RESOLVED | Noted: 2020-09-25 | Resolved: 2020-10-25

## 2020-11-21 ENCOUNTER — HOSPITAL ENCOUNTER (OUTPATIENT)
Dept: CT IMAGING | Age: 50
Discharge: HOME | End: 2020-11-21
Payer: COMMERCIAL

## 2020-11-21 PROCEDURE — 71250 CT THORAX DX C-: CPT

## 2021-01-27 ENCOUNTER — OFFICE VISIT (OUTPATIENT)
Dept: NEUROLOGY | Age: 51
End: 2021-01-27
Payer: COMMERCIAL

## 2021-01-27 VITALS
SYSTOLIC BLOOD PRESSURE: 132 MMHG | HEART RATE: 84 BPM | DIASTOLIC BLOOD PRESSURE: 86 MMHG | BODY MASS INDEX: 25.99 KG/M2 | HEIGHT: 65 IN | TEMPERATURE: 97 F | WEIGHT: 156 LBS

## 2021-01-27 DIAGNOSIS — G43.109 MIGRAINE WITH AURA AND WITHOUT STATUS MIGRAINOSUS, NOT INTRACTABLE: ICD-10-CM

## 2021-01-27 PROCEDURE — 99213 OFFICE O/P EST LOW 20 MIN: CPT | Performed by: PSYCHIATRY & NEUROLOGY

## 2021-01-27 RX ORDER — ERENUMAB-AOOE 70 MG/ML
INJECTION SUBCUTANEOUS
Qty: 1 PEN | Refills: 5 | Status: SHIPPED | OUTPATIENT
Start: 2021-01-27 | End: 2021-07-30

## 2021-01-27 NOTE — PROGRESS NOTES
Dontae Manning   Neurology followup    Subjective:   CC/HP  History was obtained from the patient. Interval history:   Patient states that she is doing fairly well. She still gets 1-2 headaches a month for which she takes Maxalt but she feels in general her migraines are fairly stable. She feels that the Aimovig injections are working reasonably well. She has had to use much less rizatriptan than usual.  No major side effects of medication. Patient cannot take topiramate because of history of kidney stones. She was unable to tolerate propranolol. Detailed history:  Patient states that she had headaches when she was very young during school days but then they got better. [facility] in the last few years they have come back and in the last few months they have been worse.  Now she gets headaches almost once every week. Tawanda Andes can last for several hours.  Headaches are throbbing in nature.  She has scintillating scotoma with severe headaches. Ani Byers is a family history of headaches in her mother and grandmother  She had an episode of focal visual hemianopsia last week along with a scintillating scotoma when she had her severe headache. Bright light and loud noise make the headaches worse.  No clear relieving factors.     REVIEW OF SYSTEMS    Constitutional:  []   Chills   []  Fatigue   []  Fevers   []  Malaise   []  Weight loss     [x] Denies all of the above    Respiratory:   []  Cough    []  Shortness of breath         [x] Denies all of the above     Cardiovascular:   []  Chest pain    []  Exertional chest pressure/discomfort           [] Palpitations    []  Syncope     [x] Denies all of the above        Past Medical History:   Diagnosis Date    Allergic rhinitis     Kidney stone     Migraine      Family History   Problem Relation Age of Onset    Diabetes Mother     High Blood Pressure Mother     High Blood Pressure Father     High Blood Pressure Maternal Grandfather     Diabetes Maternal WBC 6.7 09/25/2020    RBC 4.55 09/25/2020    HGB 14.3 09/25/2020    HCT 43.0 09/25/2020    MCV 94.7 09/25/2020    MCH 31.5 09/25/2020    MCHC 33.3 09/25/2020    RDW 12.4 09/25/2020     09/25/2020    MPV 7.6 09/25/2020     BMP:    Lab Results   Component Value Date     09/25/2020    K 4.3 09/25/2020     09/25/2020    CO2 25 09/25/2020    BUN 15 09/25/2020    LABALBU 4.8 09/25/2020    CREATININE 0.8 09/25/2020    CALCIUM 9.4 09/25/2020    GFRAA >60 09/25/2020    LABGLOM >60 09/25/2020    GLUCOSE 99 09/25/2020       Impression :  Intractable migraines, now doing better on Aimovig  She takes occasional Maxalt  She is also on verapamil from her primary care physician    Plan :  Chrissie Segura  Return in 6 months        Please note a portion of  this chart was generated using dragon dictation software. Although every effort was made to ensure the accuracy of this automated transcription, some errors in transcription may have occurred. Pursuant to the emergency declaration under the [facility] waiver authority and the [employer] Act, this Virtual  Visit was conducted, with patient's consent, to reduce the patient's risk of exposure to COVID-19 and provide continuity of care for an established patient. Services were provided through a video synchronous discussion virtually to substitute for in-person clinic visit.

## 2021-02-12 ENCOUNTER — OFFICE VISIT (OUTPATIENT)
Dept: INTERNAL MEDICINE CLINIC | Age: 51
End: 2021-02-12
Payer: COMMERCIAL

## 2021-02-12 VITALS
SYSTOLIC BLOOD PRESSURE: 132 MMHG | OXYGEN SATURATION: 99 % | TEMPERATURE: 97.5 F | HEART RATE: 84 BPM | WEIGHT: 158 LBS | BODY MASS INDEX: 26.29 KG/M2 | DIASTOLIC BLOOD PRESSURE: 86 MMHG

## 2021-02-12 DIAGNOSIS — R31.29 OTHER MICROSCOPIC HEMATURIA: Primary | ICD-10-CM

## 2021-02-12 DIAGNOSIS — G43.109 MIGRAINE WITH AURA AND WITHOUT STATUS MIGRAINOSUS, NOT INTRACTABLE: ICD-10-CM

## 2021-02-12 DIAGNOSIS — Z87.442 HISTORY OF KIDNEY STONES: ICD-10-CM

## 2021-02-12 LAB
BILIRUBIN, POC: NEGATIVE
BLOOD URINE, POC: NORMAL
CLARITY, POC: NORMAL
COLOR, POC: NORMAL
GLUCOSE URINE, POC: NEGATIVE
KETONES, POC: NEGATIVE
LEUKOCYTE EST, POC: NEGATIVE
NITRITE, POC: NEGATIVE
PH, POC: 5.5
PROTEIN, POC: NEGATIVE
SPECIFIC GRAVITY, POC: >=1.03
UROBILINOGEN, POC: NORMAL

## 2021-02-12 PROCEDURE — 99213 OFFICE O/P EST LOW 20 MIN: CPT | Performed by: NURSE PRACTITIONER

## 2021-02-12 PROCEDURE — 81002 URINALYSIS NONAUTO W/O SCOPE: CPT | Performed by: NURSE PRACTITIONER

## 2021-02-12 RX ORDER — RIZATRIPTAN BENZOATE 5 MG/1
TABLET, ORALLY DISINTEGRATING ORAL
Qty: 9 TABLET | Refills: 0 | Status: SHIPPED | OUTPATIENT
Start: 2021-02-12 | End: 2021-07-30 | Stop reason: SDUPTHER

## 2021-02-12 RX ORDER — TAMSULOSIN HYDROCHLORIDE 0.4 MG/1
0.4 CAPSULE ORAL DAILY
Qty: 10 CAPSULE | Refills: 0 | Status: SHIPPED | OUTPATIENT
Start: 2021-02-12 | End: 2021-11-03 | Stop reason: ALTCHOICE

## 2021-02-12 RX ORDER — HYDROCODONE BITARTRATE AND ACETAMINOPHEN 5; 325 MG/1; MG/1
1 TABLET ORAL EVERY 8 HOURS PRN
Qty: 10 TABLET | Refills: 0 | Status: SHIPPED | OUTPATIENT
Start: 2021-02-12 | End: 2021-02-19

## 2021-02-12 ASSESSMENT — ENCOUNTER SYMPTOMS
CONSTIPATION: 0
NAUSEA: 0
COUGH: 0
VOMITING: 0
BLOOD IN STOOL: 0
SHORTNESS OF BREATH: 0
ABDOMINAL PAIN: 0
DIARRHEA: 0

## 2021-02-12 ASSESSMENT — PATIENT HEALTH QUESTIONNAIRE - PHQ9
SUM OF ALL RESPONSES TO PHQ QUESTIONS 1-9: 0
1. LITTLE INTEREST OR PLEASURE IN DOING THINGS: 0

## 2021-02-12 NOTE — PATIENT INSTRUCTIONS
Increase water intake      Patient Education   tamsulosin  Pronunciation:  adams forrest SERGIO sin  Brand:  Flomax  What is the most important information I should know about tamsulosin? Follow all directions on your medicine label and package. Tell each of your healthcare providers about all your medical conditions, allergies, and all medicines you use. What is tamsulosin? Tamsulosin is an alpha-blocker that is used to improve urination in men with benign prostatic hyperplasia (enlarged prostate). Tamsulosin is not approved for use in women or children. Tamsulosin may also be used for purposes not listed in this medication guide. What should I discuss with my healthcare provider before taking tamsulosin? You should not use tamsulosin if you are allergic to it. Tell your doctor if you have ever had:  · liver or kidney disease;  · prostate cancer;  · low blood pressure; or  · an allergy to sulfa drugs. Tamsulosin can affect your pupils. If you have cataract surgery, tell your surgeon ahead of time that you use this medicine. Tamsulosin is not for use in women, and the effects of this medicine during pregnancy or in breastfeeding women are unknown. How should I take tamsulosin? Your doctor may test your prostate specific antigen (PSA) to check for prostate cancer before you take tamsulosin. Follow all directions on your prescription label and read all medication guides or instruction sheets. Your doctor may occasionally change your dose. Use the medicine exactly as directed. Tamsulosin is usually taken once a day, approximately 30 minutes after the same meal each day. Swallow the capsule whole and do not crush, chew, break, or open it. Your blood pressure will need to be checked often. Some things can cause your blood pressure to get too low. This includes vomiting, diarrhea, or heavy sweating. Call your doctor if you are sick with vomiting or diarrhea. Store at room temperature away from moisture and heat. If you stop taking tamsulosin for any reason, call your doctor before you start taking it again. You may need a dose adjustment. What happens if I miss a dose? Take the medicine as soon as you can, but skip the missed dose if it is almost time for your next dose. Do not take two doses at one time. If you miss your doses for several days in a row, talk with your doctor before restarting the medication. What happens if I overdose? Seek emergency medical attention or call the Poison Help line at 1-604.740.5552. What should I avoid while taking tamsulosin? Avoid driving or hazardous activity until you know how this medicine will affect you. Your reactions could be impaired. Avoid getting up too fast from a sitting or lying position, or you may feel dizzy. What are the possible side effects of tamsulosin? Get emergency medical help if you have signs of an allergic reaction (hives, difficult breathing, swelling in your face or throat) or a severe skin reaction (fever, sore throat, burning eyes, skin pain, red or purple skin rash with blistering and peeling). Stop using tamsulosin and call your doctor at once if you have:  · a light-headed feeling, like you might pass out; or  · penis erection that is painful or lasts 4 hours or longer. Tamsulosin lowers blood pressure and may cause dizziness or fainting, especially when you first start taking it. You may feel very dizzy when you first wake up. Avoid getting up too fast from a sitting or lying position, or you may feel dizzy. Common side effects may include:  · abnormal ejaculation, decreased amount of semen;  · dizziness, drowsiness, weakness;  · runny nose, cough;  · back pain, chest pain;  · nausea, diarrhea;  · tooth problems;  · blurred vision;  · sleep problems (insomnia); or  · decreased interest in sex. Brand:  Hycet, Lorcet, Norco, Verdrocet, Vicodin, Xodol, Zamicet  What is the most important information I should know about acetaminophen and hydrocodone? MISUSE OF OPIOID MEDICINE CAN CAUSE ADDICTION, OVERDOSE, OR DEATH. Keep the medication in a place where others cannot get to it. Taking opioid medicine during pregnancy may cause life-threatening withdrawal symptoms in the . Fatal side effects can occur if you use opioid medicine with alcohol, or with other drugs that cause drowsiness or slow your breathing. Stop taking this medicine and call your doctor right away if you have skin redness or a rash that spreads and causes blistering and peeling. What is acetaminophen and hydrocodone? Acetaminophen and hydrocodone is a combination medicine used to relieve moderate to severe pain. Acetaminophen and hydrocodone may also be used for purposes not listed in this medication guide. What should I discuss with my healthcare provider before taking acetaminophen and hydrocodone? You should not use this medicine if you are allergic to acetaminophen or hydrocodone, or if you have:  · severe asthma or breathing problems; or  · a blockage in your stomach or intestines. Tell your doctor if you have ever had:  · breathing problems, sleep apnea;  · liver disease;  · a drug or alcohol addiction;  · kidney disease;  · a head injury or seizures;  · urination problems; or  · problems with your thyroid, pancreas, or gallbladder. If you use opioid medicine while you are pregnant, your baby could become dependent on the drug. This can cause life-threatening withdrawal symptoms in the baby after it is born. Babies born dependent on opioids may need medical treatment for several weeks. Do not breastfeed. This medicine can pass into breast milk and cause drowsiness, breathing problems, or death in a nursing baby. How should I take acetaminophen and hydrocodone? Follow all directions on your prescription label. Never take this medicine in larger amounts, or for longer than prescribed. An overdose can damage your liver or cause death. Tell your doctor if you feel an increased urge to use more of this medicine. Never share this medicine with another person, especially someone with a history of drug abuse or addiction. MISUSE CAN CAUSE ADDICTION, OVERDOSE, OR DEATH. Keep the medicine in a place where others cannot get to it. Selling or giving away acetaminophen and hydrocodone is against the law. Measure liquid medicine carefully. Use the dosing syringe provided, or use a medicine dose-measuring device (not a kitchen spoon). If you need surgery or medical tests, tell the doctor ahead of time that you are using this medicine. You should not stop using this medicine suddenly. Follow your doctor's instructions about tapering your dose. Store at room temperature away from moisture and heat. Keep track of your medicine. You should be aware if anyone is using it improperly or without a prescription. Do not keep leftover opioid medication. Just one dose can cause death in someone using this medicine accidentally or improperly. Ask your pharmacist where to locate a drug take-back disposal program. If there is no take-back program, flush the unused medicine down the toilet. What happens if I miss a dose? Since this medicine is used for pain, you are not likely to miss a dose. Skip any missed dose if it is almost time for your next dose. Do not use two doses at one time. What happens if I overdose? Seek emergency medical attention or call the Poison Help line at 1-436.112.6308.  An overdose of acetaminophen and hydrocodone can be fatal. The first signs of an acetaminophen overdose include loss of appetite, nausea, vomiting, stomach pain, sweating, and confusion or weakness. Later symptoms may include pain in your upper stomach, dark urine, and yellowing of your skin or the whites of your eyes. Overdose can also cause severe muscle weakness, pinpoint pupils, very slow breathing, extreme drowsiness, or coma. What should I avoid while taking acetaminophen and hydrocodone? Avoid driving or operating machinery until you know how this medicine will affect you. Dizziness or drowsiness can cause falls, accidents, or severe injuries. Do not drink alcohol. Dangerous side effects or death could occur. Ask a doctor or pharmacist before using any other medicine that may contain acetaminophen (sometimes abbreviated as APAP). Taking certain medications together can lead to a fatal overdose. What are the possible side effects of acetaminophen and hydrocodone? Get emergency medical help if you have signs of an allergic reaction: hives; difficulty breathing; swelling of your face, lips, tongue, or throat. Opioid medicine can slow or stop your breathing, and death may occur. A person caring for you should seek emergency medical attention if you have slow breathing with long pauses, blue colored lips, or if you are hard to wake up. In rare cases, acetaminophen may cause a severe skin reaction that can be fatal. This could occur even if you have taken acetaminophen in the past and had no reaction. Stop taking this medicine and call your doctor right away if you have skin redness or a rash that spreads and causes blistering and peeling.    Call your doctor at once if you have:  · noisy breathing, sighing, shallow breathing, breathing that stops during sleep;  · a light-headed feeling, like you might pass out;  · liver problems --nausea, upper stomach pain, tiredness, loss of appetite, dark urine, shirlene-colored stools, jaundice (yellowing of the skin or eyes); or · low cortisol levels -- nausea, vomiting, loss of appetite, dizziness, worsening tiredness or weakness. Seek medical attention right away if you have symptoms of serotonin syndrome, such as: agitation, hallucinations, fever, sweating, shivering, fast heart rate, muscle stiffness, twitching, loss of coordination, nausea, vomiting, or diarrhea. Serious side effects may be more likely in older adults and those who are overweight, malnourished, or debilitated. Long-term use of opioid medication may affect fertility (ability to have children) in men or women. It is not known whether opioid effects on fertility are permanent. Common side effects include:  · dizziness, drowsiness, feeling tired;  · nausea, vomiting, stomach pain;  · constipation; or  · headache. This is not a complete list of side effects and others may occur. Call your doctor for medical advice about side effects. You may report side effects to FDA at 6-584-FDA-5274. What other drugs will affect acetaminophen and hydrocodone? You may have breathing problems or withdrawal symptoms if you start or stop taking certain other medicines. Tell your doctor if you also use an antibiotic, antifungal medication, heart or blood pressure medication, seizure medication, or medicine to treat HIV or hepatitis C. Opioid medication can interact with many other drugs and cause dangerous side effects or death.  Be sure your doctor knows if you also use:  · cold or allergy medicines, bronchodilator asthma/COPD medication, or a diuretic (\"water pill\");  · medicines for motion sickness, irritable bowel syndrome, or overactive bladder;  · other narcotic medications --opioid pain medicine or prescription cough medicine;  · a sedative like Valium --diazepam, alprazolam, lorazepam, Xanax, Klonopin, Versed, and others;  · drugs that make you sleepy or slow your breathing --a sleeping pill, muscle relaxer, medicine to treat mood disorders or mental illness; · drugs that affect serotonin levels in your body --a stimulant, or medicine for depression, Parkinson's disease, migraine headaches, serious infections, or nausea and vomiting. This list is not complete. Other drugs may affect acetaminophen and hydrocodone, including prescription and over-the-counter medicines, vitamins, and herbal products. Not all possible interactions are listed here. Where can I get more information? Your doctor or pharmacist can provide more information about acetaminophen and hydrocodone. Remember, keep this and all other medicines out of the reach of children, never share your medicines with others, and use this medication only for the indication prescribed. Every effort has been made to ensure that the information provided by [address] is accurate, up-to-date, and complete, but no guarantee is made to that effect. Drug information contained herein may be time sensitive. [facility] information has been compiled for use by healthcare practitioners and consumers in the [facility] and therefore [facility] does not warrant that uses outside of the [facility] are appropriate, unless specifically indicated otherwise. [facility]'s drug information does not endorse drugs, diagnose patients or recommend therapy. [facility]'s drug information is an informational resource designed to assist licensed healthcare practitioners in caring for their patients and/or to serve consumers viewing this service as a supplement to, and not a substitute for, the expertise, skill, knowledge and judgment of healthcare practitioners. The absence of a warning for a given drug or drug combination in no way should be construed to indicate that the drug or drug combination is safe, effective or appropriate for any given patient. [facility] does not assume any responsibility for any aspect of healthcare administered with the aid of information [facility] provides. The information contained herein is not intended to cover all possible uses, directions, precautions, warnings, drug interactions, allergic reactions, or adverse effects. If you have questions about the drugs you are taking, check with your doctor, nurse or pharmacist.  Copyright 7689-4096 [address]. Version: 16.01. Revision date: 1/22/2020. Care instructions adapted under license by [facility] ([facility]). If you have questions about a medical condition or this instruction, always ask your healthcare professional. [place] [address] any warranty or liability for your use of this information.

## 2021-02-12 NOTE — PROGRESS NOTES
Acute Office Visit  2/12/2021    SUBJECTIVE:    Patient ID: Jamison Brunner is a 49 y.o. female. Chief Complaint   Patient presents with    Urinary Tract Infection     felt like something was stuck and discomfort      HPI: The patient presents to the office for an acute visit. Pt reports a history of kidney stones- last 3 years ago. Has had 2 lithotripsy procedures in the past. She feels as if something was \"stuck\" in her urethra. Pt reports that she urinated in the office to give a sample and since, she is feeling better. Denies pain at this time. No flank pain. Denies dysuria, hematuria, nocturia, urinary frequency, urgency, odor or hesitancy. Denies abdominal pain. Denies vaginal discharge or bleeding. Denies fever/chills. Allergies   Allergen Reactions    Zithromax [Azithromycin] Rash     Current Outpatient Medications   Medication Sig Dispense Refill    rizatriptan (MAXALT-MLT) 5 MG disintegrating tablet TAKE 1 TABLET AT THE ONSET OF A SEVERE MIGRAINE HEADACHE. 9 tablet 0    tamsulosin (FLOMAX) 0.4 MG capsule Take 1 capsule by mouth daily 10 capsule 0    HYDROcodone-acetaminophen (NORCO) 5-325 MG per tablet Take 1 tablet by mouth every 8 hours as needed for Pain (kidney stones) for up to 7 days. Intended supply: least amount of days possible.  Take lowest dose possible to manage pain 10 tablet 0    Erenumab-aooe (AIMOVIG) 70 MG/ML SOAJ INJECT 1 CC (70 MG) SUBCUTANEOUSLY ONCE EVERY 30 DAYS 1 pen 5    albuterol sulfate HFA (VENTOLIN HFA) 108 (90 Base) MCG/ACT inhaler Inhale 2 puffs into the lungs every 6 hours as needed for Wheezing 1 Inhaler 1    olopatadine (PATANOL) 0.1 % ophthalmic solution 1 drop 2 times daily as needed       levocetirizine (XYZAL) 5 MG tablet Take 5 mg by mouth nightly      fluticasone (VERAMYST) 27.5 MCG/SPRAY nasal spray by Nasal route every morning      ibuprofen (ADVIL;MOTRIN) 200 MG tablet Take 200 mg by mouth every 6 hours as needed for Pain  ondansetron (ZOFRAN ODT) 8 MG TBDP disintegrating tablet Place 1 tablet under the tongue every 8 hours as needed for Nausea or Vomiting 20 tablet 1    verapamil (CALAN SR) 120 MG extended release tablet Take 1 tablet by mouth nightly 90 tablet 3    Aspirin-Acetaminophen-Caffeine (EXCEDRIN PO) Take by mouth       No current facility-administered medications for this visit. Review of Systems   Constitutional: Negative for appetite change, chills, fatigue and fever. Respiratory: Negative for cough and shortness of breath. Cardiovascular: Negative for chest pain. Gastrointestinal: Negative for abdominal pain, blood in stool, constipation, diarrhea, nausea and vomiting. Genitourinary: Negative for decreased urine volume, difficulty urinating, dysuria, flank pain, frequency, genital sores, hematuria and urgency. Skin: Negative for pallor. OBJECTIVE:  /86   Pulse 84   Temp 97.5 °F (36.4 °C) (Temporal)   Wt 158 lb (71.7 kg)   SpO2 99%   BMI 26.29 kg/m²    Physical Exam  Vitals signs reviewed. Constitutional:       General: She is not in acute distress. Appearance: She is well-developed. She is not diaphoretic. HENT:      Head: Normocephalic. Cardiovascular:      Rate and Rhythm: Normal rate and regular rhythm. Pulmonary:      Effort: Pulmonary effort is normal. No respiratory distress. Breath sounds: Normal breath sounds. No wheezing. Abdominal:      General: Bowel sounds are normal. There is no distension. Palpations: Abdomen is soft. There is no mass. Tenderness: There is no abdominal tenderness. There is no guarding or rebound. Genitourinary:     Comments: No CVA tenderness noted  Skin:     General: Skin is warm and dry. Coloration: Skin is not pale. Findings: No erythema or rash. Neurological:      Mental Status: She is alert and oriented to person, place, and time. ASSESSMENT/PLAN:  Arlene Narvaez was seen today for urinary tract infection. Diagnoses and all orders for this visit:    Other microscopic hematuria/History of kidney stones  -     Patient presents and reports that she has a history of kidney stones. She reports she feels as if something is stuck in her urethra. She reports the pain was very bad before her appointment. However, she urinated to give us a urine sample and her symptoms resolved. - Upon evaluating pt's urine, the patient has what appears to be a stone in her urine. Will send for stone analysis  - STONE ANALYSIS; Future  -     POCT Urinalysis no Micro  - I recommended that the patient increase her fluid water intake. She has a history of lithotripsy.  - Since we are going into the weekend, she is concerned regarding the possibility of more kidney stones. Reviewed option of CT. Patient declines. - Since there is a likely kidney stone noted in the urine today and pt has a history of kidney stones and a large amount of blood noted on the UA, recommend the patient increase fluid water intake and if symptoms return, use Flomax with as needed Norco. Pt agreeable. - OARRS report reviewed. Short-term pain medication prescribed PRN. Patient reports she will only take this as needed for kidney stone pain  -     tamsulosin (FLOMAX) 0.4 MG capsule; Take 1 capsule by mouth daily - patient education handout provided and reviewed with the pt.  -     HYDROcodone-acetaminophen (NORCO) 5-325 MG per tablet; Take 1 tablet by mouth every 8 hours as needed for Pain (kidney stones) for up to 7 days. Intended supply: least amount of days possible. Take lowest dose possible to manage pain - patient education handout provided and reviewed with the pt. - Patient will call if symptoms worsen or fail to improve  - Red flag warning signs reviewed with the patient and she is agreeable to going the ER if these occur    Return for as previously scheduled or sooner if needed. Pt informed to call if symptoms worsen or fail to improve. All questions answered. Patient states no further questions or concerns at this time.     Electronically signed by LARA Hutton CNP 02/12/21

## 2021-02-17 LAB
CALCULI COMPOSITION: NORMAL
MASS: 21 MG
STONE DESCRIPTION: NORMAL
STONE NUMBER: 2
STONE SIZE: NORMAL MM

## 2021-06-30 ENCOUNTER — TELEPHONE (OUTPATIENT)
Dept: NEUROLOGY | Age: 51
End: 2021-06-30

## 2021-06-30 NOTE — TELEPHONE ENCOUNTER
PT states that she needs to have Dr Ankit Art call [facility] on [address] to let her get her Aimovig 70 mg/ml soaj .   She states that she went there yesterday and they told her she needed doctor approval

## 2021-06-30 NOTE — TELEPHONE ENCOUNTER
Reached voicemail. [facility] advising we were aware of the PA/denial and have initiated the appeal for reconsideration.

## 2021-07-30 ENCOUNTER — OFFICE VISIT (OUTPATIENT)
Dept: NEUROLOGY | Age: 51
End: 2021-07-30
Payer: COMMERCIAL

## 2021-07-30 VITALS
HEART RATE: 85 BPM | BODY MASS INDEX: 26.33 KG/M2 | WEIGHT: 158.2 LBS | SYSTOLIC BLOOD PRESSURE: 123 MMHG | DIASTOLIC BLOOD PRESSURE: 85 MMHG

## 2021-07-30 DIAGNOSIS — G43.109 MIGRAINE WITH AURA AND WITHOUT STATUS MIGRAINOSUS, NOT INTRACTABLE: ICD-10-CM

## 2021-07-30 DIAGNOSIS — G43.109 MIGRAINE WITH AURA AND WITHOUT STATUS MIGRAINOSUS, NOT INTRACTABLE: Primary | ICD-10-CM

## 2021-07-30 PROCEDURE — 99213 OFFICE O/P EST LOW 20 MIN: CPT | Performed by: PSYCHIATRY & NEUROLOGY

## 2021-07-30 RX ORDER — ERENUMAB-AOOE 70 MG/ML
INJECTION SUBCUTANEOUS
Qty: 1 PEN | Refills: 5 | Status: SHIPPED | OUTPATIENT
Start: 2021-07-30 | End: 2022-02-01 | Stop reason: SDUPTHER

## 2021-07-30 RX ORDER — RIZATRIPTAN BENZOATE 5 MG/1
TABLET, ORALLY DISINTEGRATING ORAL
Qty: 9 TABLET | Refills: 0 | Status: SHIPPED | OUTPATIENT
Start: 2021-07-30 | End: 2021-08-23

## 2021-07-30 RX ORDER — ONDANSETRON 8 MG/1
8 TABLET, ORALLY DISINTEGRATING ORAL EVERY 8 HOURS PRN
Qty: 10 TABLET | Refills: 1 | Status: SHIPPED | OUTPATIENT
Start: 2021-07-30

## 2021-07-30 NOTE — PROGRESS NOTES
Anirudh Jara   Neurology followup    Subjective:   CC/HP  History was obtained from the patient. Interval history:   Patient states that she is doing fairly well. She still gets 1-2 headaches a month for which she takes Maxalt but she feels in general her migraines are fairly stable. She feels that the Aimovig injections are working reasonably well. She has had to use much less rizatriptan than usual.  No major side effects of medication. Patient cannot take topiramate because of history of kidney stones. She was unable to tolerate propranolol. Detailed history:  Patient states that she had headaches when she was very young during school days but then they got better. [facility] in the last few years they have come back and in the last few months they have been worse.  Now she gets headaches almost once every week. Eldonna Idamay can last for several hours.  Headaches are throbbing in nature.  She has scintillating scotoma with severe headaches. Jose Juan Salinas is a family history of headaches in her mother and grandmother  She had an episode of focal visual hemianopsia last week along with a scintillating scotoma when she had her severe headache. Bright light and loud noise make the headaches worse.  No clear relieving factors.     REVIEW OF SYSTEMS    Constitutional:  []   Chills   []  Fatigue   []  Fevers   []  Malaise   []  Weight loss     [x] Denies all of the above    Respiratory:   []  Cough    []  Shortness of breath         [x] Denies all of the above     Cardiovascular:   []  Chest pain    []  Exertional chest pressure/discomfort           [] Palpitations    []  Syncope     [x] Denies all of the above        Past Medical History:   Diagnosis Date    Allergic rhinitis     Kidney stone     Migraine      Family History   Problem Relation Age of Onset    Diabetes Mother     High Blood Pressure Mother     High Blood Pressure Father     High Blood Pressure Maternal Grandfather     Diabetes Maternal reflexes normal. Plantar reflexes downgoing. Sensory exam normal. Coordination normal. Gait normal. No carotid bruit. No neck stiffness. Data :  LABS:  General Labs:    CBC:   Lab Results   Component Value Date    WBC 6.7 09/25/2020    RBC 4.55 09/25/2020    HGB 14.3 09/25/2020    HCT 43.0 09/25/2020    MCV 94.7 09/25/2020    MCH 31.5 09/25/2020    MCHC 33.3 09/25/2020    RDW 12.4 09/25/2020     09/25/2020    MPV 7.6 09/25/2020     BMP:    Lab Results   Component Value Date     09/25/2020    K 4.3 09/25/2020     09/25/2020    CO2 25 09/25/2020    BUN 15 09/25/2020    LABALBU 4.8 09/25/2020    CREATININE 0.8 09/25/2020    CALCIUM 9.4 09/25/2020    GFRAA >60 09/25/2020    LABGLOM >60 09/25/2020    GLUCOSE 99 09/25/2020       Impression :  Intractable migraines, now doing better on Aimovig  She takes occasional Maxalt  She is also on verapamil from her primary care physician    Plan :  Vic [facility]  Return in 6 months        Please note a portion of  this chart was generated using dragon dictation software. Although every effort was made to ensure the accuracy of this automated transcription, some errors in transcription may have occurred. Pursuant to the emergency declaration under the [facility] waiver authority and the [employer] General Act, this Virtual  Visit was conducted, with patient's consent, to reduce the patient's risk of exposure to COVID-19 and provide continuity of care for an established patient. Services were provided through a video synchronous discussion virtually to substitute for in-person clinic visit.

## 2021-08-22 DIAGNOSIS — G43.109 MIGRAINE WITH AURA AND WITHOUT STATUS MIGRAINOSUS, NOT INTRACTABLE: ICD-10-CM

## 2021-08-23 RX ORDER — RIZATRIPTAN BENZOATE 5 MG/1
TABLET, ORALLY DISINTEGRATING ORAL
Qty: 9 TABLET | Refills: 0 | Status: SHIPPED | OUTPATIENT
Start: 2021-08-23 | End: 2021-11-19

## 2021-10-12 DIAGNOSIS — G43.109 MIGRAINE WITH AURA AND WITHOUT STATUS MIGRAINOSUS, NOT INTRACTABLE: ICD-10-CM

## 2021-10-20 LAB
BACTERIA: ABNORMAL /HPF
BILIRUBIN URINE: NEGATIVE
BLOOD, URINE: ABNORMAL
CLARITY: ABNORMAL
COLOR: YELLOW
EPITHELIAL CELLS, UA: 3 /HPF (ref 0–5)
GLUCOSE URINE: NEGATIVE MG/DL
HYALINE CASTS: 2 /LPF (ref 0–8)
KETONES, URINE: NEGATIVE MG/DL
LEUKOCYTE ESTERASE, URINE: ABNORMAL
MICROSCOPIC EXAMINATION: YES
NITRITE, URINE: NEGATIVE
PH UA: 6 (ref 5–8)
PROTEIN UA: NEGATIVE MG/DL
RBC UA: 2 /HPF (ref 0–4)
SPECIFIC GRAVITY UA: 1.02 (ref 1–1.03)
URINE TYPE: ABNORMAL
UROBILINOGEN, URINE: 0.2 E.U./DL
WBC UA: 20 /HPF (ref 0–5)

## 2021-11-03 ENCOUNTER — OFFICE VISIT (OUTPATIENT)
Dept: INTERNAL MEDICINE CLINIC | Age: 51
End: 2021-11-03
Payer: COMMERCIAL

## 2021-11-03 VITALS
BODY MASS INDEX: 26.63 KG/M2 | DIASTOLIC BLOOD PRESSURE: 98 MMHG | SYSTOLIC BLOOD PRESSURE: 130 MMHG | OXYGEN SATURATION: 99 % | HEART RATE: 68 BPM | WEIGHT: 160 LBS

## 2021-11-03 DIAGNOSIS — G43.109 MIGRAINE WITH AURA AND WITHOUT STATUS MIGRAINOSUS, NOT INTRACTABLE: ICD-10-CM

## 2021-11-03 DIAGNOSIS — I73.00 RAYNAUD'S DISEASE WITHOUT GANGRENE: ICD-10-CM

## 2021-11-03 DIAGNOSIS — Z00.00 WELL ADULT EXAM: Primary | ICD-10-CM

## 2021-11-03 PROCEDURE — 99396 PREV VISIT EST AGE 40-64: CPT | Performed by: INTERNAL MEDICINE

## 2021-11-03 RX ORDER — RIMEGEPANT SULFATE 75 MG/75MG
1 TABLET, ORALLY DISINTEGRATING ORAL DAILY PRN
Qty: 10 TABLET | Refills: 0 | Status: SHIPPED | OUTPATIENT
Start: 2021-11-03 | End: 2022-02-01 | Stop reason: SDUPTHER

## 2021-11-03 ASSESSMENT — ENCOUNTER SYMPTOMS
CHEST TIGHTNESS: 0
SHORTNESS OF BREATH: 0
DIARRHEA: 0
CONSTIPATION: 0

## 2021-11-03 NOTE — PROGRESS NOTES
Patient: Yusra Perez is a 49 y.o. female who presents today with the following Chief Complaint(s):  No chief complaint on file. HPI     Here today for follow up/wellness visit. Had a migraine this morning- did take Maxalt this morning which did help. Is still on Amovig and verapamil for prophylaxis. Has been having anywhere from 0-2 migraines/month. Maxalt will sometimes make her feel very sleepy. No loss of vision. BP is elevated today, does not typically check her blood pressure. Did have a migraine this morning. Got her flu vaccine at her allergist's last week. Up to date on colonoscopy. Last done 2017, due 2022. Dr. Koki Ac    Had pap smear done earlier this year. Scheduled for mammogram in December. No regular exercise. Working in [employer] in [place]. Does have Raynaud's. Affects fingers and toes. Worse in the winter. No pain.      Wt Readings from Last 3 Encounters:   11/03/21 160 lb (72.6 kg)   07/30/21 158 lb 3.2 oz (71.8 kg)   02/12/21 158 lb (71.7 kg)     Temp Readings from Last 3 Encounters:   02/12/21 97.5 °F (36.4 °C) (Temporal)   01/27/21 97 °F (36.1 °C)   09/25/20 97.1 °F (36.2 °C)     BP Readings from Last 3 Encounters:   11/03/21 (!) 130/98   07/30/21 123/85   02/12/21 132/86     Pulse Readings from Last 3 Encounters:   11/03/21 68   07/30/21 85   02/12/21 84         Allergies   Allergen Reactions    Zithromax [Azithromycin] Rash      Past Medical History:   Diagnosis Date    Allergic rhinitis     Kidney stone     Migraine       Past Surgical History:   Procedure Laterality Date    HEMORRHOID SURGERY  2017    LITHOTRIPSY      x 2    OTHER SURGICAL HISTORY      anal fissure repair    PELVIC LAPAROSCOPY      endometriosis      Social History     Socioeconomic History    Marital status:      Spouse name: Not on file    Number of children: Not on file    Years of education: Not on file    Highest education level: Not on file   Occupational History    Not on file   Tobacco Use    Smoking status: Never Smoker    Smokeless tobacco: Never Used   Vaping Use    Vaping Use: Never used   Substance and Sexual Activity    Alcohol use: Yes     Comment: occasionallly    Drug use: Never    Sexual activity: Not on file   Other Topics Concern    Not on file   Social History Narrative    Not on file     Social Determinants of Health     Financial Resource Strain:     Difficulty of Paying Living Expenses: Not on file   Food Insecurity:     Worried About Running Out of Food in the Last Year: Not on file    Desire of Food in the Last Year: Not on file   Transportation Needs:     Lack of Transportation (Medical): Not on file    Lack of Transportation (Non-Medical):  Not on file   Physical Activity:     Days of Exercise per Week: Not on file    Minutes of Exercise per Session: Not on file   Stress:     Feeling of Stress : Not on file   Social Connections:     Frequency of Communication with Friends and Family: Not on file    Frequency of Social Gatherings with Friends and Family: Not on file    Attends [employer] Services: Not on file    Active Member of [address] [place] [address] or Organizations: Not on file    Attends Club or Organization Meetings: Not on file    Marital Status: Not on file   Intimate Partner Violence:     Fear of Current or Ex-Partner: Not on file    Emotionally Abused: Not on file    Physically Abused: Not on file    Sexually Abused: Not on file   Housing Stability:     Unable to Pay for Housing in the Last Year: Not on file    Number of [place] in the Last Year: Not on file    Unstable Housing in the Last Year: Not on file     Family History   Problem Relation Age of Onset    Diabetes Mother     High Blood Pressure Mother     High Blood Pressure Father     High Blood Pressure Maternal Grandfather     Diabetes Maternal Grandfather     Heart Disease Maternal Grandfather     Colon Cancer Paternal Grandmother         61    Heart Disease Maternal Cousin         pacemaker    Migraines Daughter         Outpatient Medications Prior to Visit   Medication Sig Dispense Refill    verapamil (CALAN SR) 120 MG extended release tablet TAKE 1 TABLET BY MOUTH EVERY EVENING 90 tablet 3    rizatriptan (MAXALT-MLT) 5 MG disintegrating tablet TAKE 1 TABLET BY MOUTH FOR MIGRAINE MAY REPEAT IN 2 HOURS IF NEEDED 9 tablet 0    Erenumab-aooe (AIMOVIG) 70 MG/ML SOAJ INJECT 1 ML SUBCUTANEOUSLY EVERY 30 DAYS 1 pen 5    ondansetron (ZOFRAN ODT) 8 MG TBDP disintegrating tablet Place 1 tablet under the tongue every 8 hours as needed for Nausea or Vomiting 10 tablet 1    albuterol sulfate HFA (VENTOLIN HFA) 108 (90 Base) MCG/ACT inhaler Inhale 2 puffs into the lungs every 6 hours as needed for Wheezing 1 Inhaler 1    olopatadine (PATANOL) 0.1 % ophthalmic solution 1 drop 2 times daily as needed       levocetirizine (XYZAL) 5 MG tablet Take 5 mg by mouth nightly      fluticasone (VERAMYST) 27.5 MCG/SPRAY nasal spray by Nasal route every morning      Aspirin-Acetaminophen-Caffeine (EXCEDRIN PO) Take by mouth      ibuprofen (ADVIL;MOTRIN) 200 MG tablet Take 200 mg by mouth every 6 hours as needed for Pain      tamsulosin (FLOMAX) 0.4 MG capsule Take 1 capsule by mouth daily 10 capsule 0     No facility-administered medications prior to visit. Patient'spast medical history, surgical history, family history, medications,  and allergies  were all reviewed and updated as appropriate today. Review of Systems   Constitutional: Negative for appetite change, fatigue and fever. Respiratory: Negative for chest tightness and shortness of breath. Cardiovascular: Negative for chest pain. Gastrointestinal: Negative for constipation and diarrhea. Skin: Negative for rash. BP (!) 130/98   Pulse 68   Wt 160 lb (72.6 kg)   SpO2 99%   BMI 26.63 kg/m²   Physical Exam  Vitals and nursing note reviewed.    Constitutional:       Appearance: She is well-developed. She is not toxic-appearing. HENT:      Head: Normocephalic. Right Ear: Tympanic membrane, ear canal and external ear normal.      Left Ear: Tympanic membrane, ear canal and external ear normal.      Mouth/Throat:      Pharynx: No oropharyngeal exudate or posterior oropharyngeal erythema. Eyes:      General: No scleral icterus. Extraocular Movements: Extraocular movements intact. Conjunctiva/sclera: Conjunctivae normal.      Pupils: Pupils are equal, round, and reactive to light. Neck:      Thyroid: No thyroid mass or thyromegaly. Vascular: No carotid bruit. Cardiovascular:      Rate and Rhythm: Normal rate and regular rhythm. Heart sounds: Normal heart sounds. No murmur heard. Pulmonary:      Effort: Pulmonary effort is normal.      Breath sounds: Normal breath sounds. Musculoskeletal:      Right lower leg: No edema. Left lower leg: No edema. Lymphadenopathy:      Cervical: No cervical adenopathy. Neurological:      General: No focal deficit present. Mental Status: She is alert and oriented to person, place, and time. Psychiatric:         Mood and Affect: Mood normal.         Behavior: Behavior normal. Behavior is cooperative. ASSESSMENT/PLAN:    Problem List Items Addressed This Visit     Migraine with aura and without status migrainosus, not intractable     Controlled with monthly Aimovig and verapamil  mg daily. She continues to follow with neurology. She does take Maxalt as needed for migraines that feels very fatigued after taking Maxalt. We will try her on Nurtec to see if she has better symptom relief without side effects. Relevant Medications    Rimegepant Sulfate (NURTEC) 75 MG TBDP    Raynaud's disease without gangrene     Patient with Raynaud's affecting her toes and fingers. Can consider changing verapamil to Procardia or adding Procardia.   For now patient is going to continue with symptomatic control of Raynaud's with warming hands and toes as needed. Well adult exam - Primary     No problems identified on exam.  Follows with Dr. Alayna Dobbs for GYN care. Is up-to-date on colonoscopies with history of colon polyps dating back to her 25s. She is scheduled for a mammogram in December. Check fasting blood work. Patient will be due for colonoscopy in 2022. Patient has completed Pfizer COVID-19 vaccine series but is due for her booster. Instructed patient to get booster. She got her flu shot at her allergist office last week. Encouraged exercise.          Relevant Orders    CBC Auto Differential    Comprehensive Metabolic Panel    Lipid Panel    Vitamin D 25 Hydroxy    TSH with Reflex          Current Outpatient Medications   Medication Sig Dispense Refill    Rimegepant Sulfate (NURTEC) 75 MG TBDP Take 1 tablet by mouth daily as needed (migraine) 10 tablet 0    verapamil (CALAN SR) 120 MG extended release tablet TAKE 1 TABLET BY MOUTH EVERY EVENING 90 tablet 3    rizatriptan (MAXALT-MLT) 5 MG disintegrating tablet TAKE 1 TABLET BY MOUTH FOR MIGRAINE MAY REPEAT IN 2 HOURS IF NEEDED 9 tablet 0    Erenumab-aooe (AIMOVIG) 70 MG/ML SOAJ INJECT 1 ML SUBCUTANEOUSLY EVERY 30 DAYS 1 pen 5    ondansetron (ZOFRAN ODT) 8 MG TBDP disintegrating tablet Place 1 tablet under the tongue every 8 hours as needed for Nausea or Vomiting 10 tablet 1    albuterol sulfate HFA (VENTOLIN HFA) 108 (90 Base) MCG/ACT inhaler Inhale 2 puffs into the lungs every 6 hours as needed for Wheezing 1 Inhaler 1    olopatadine (PATANOL) 0.1 % ophthalmic solution 1 drop 2 times daily as needed       levocetirizine (XYZAL) 5 MG tablet Take 5 mg by mouth nightly      fluticasone (VERAMYST) 27.5 MCG/SPRAY nasal spray by Nasal route every morning      Aspirin-Acetaminophen-Caffeine (EXCEDRIN PO) Take by mouth      ibuprofen (ADVIL;MOTRIN) 200 MG tablet Take 200 mg by mouth every 6 hours as needed for Pain       No current facility-administered medications for this visit. Return in about 1 year (around 11/3/2022) for sooner if needed.

## 2021-11-03 NOTE — PATIENT INSTRUCTIONS
We are moving! Our last day at [address] will be November 30. We will have virtual appointments only on December 1 and in December 2 while we are in the process of moving. We will open in her new office on December 3. Our new office is located behind [address] on [address]. It is about 3 minutes from her current office. New office address:  [address], [address]  [phone]      Please check on [email or web address] for coupons for Nurtec. Take Nurtec in place of Maxalt, do not mix Nurtec and Maxalt. I am hoping the Nurtec will stop your headaches without the side effects of Maxalt.

## 2021-11-07 PROBLEM — I73.00 RAYNAUD'S DISEASE WITHOUT GANGRENE: Status: ACTIVE | Noted: 2021-11-07

## 2021-11-08 NOTE — ASSESSMENT & PLAN NOTE
Patient with Raynaud's affecting her toes and fingers. Can consider changing verapamil to Procardia or adding Procardia. For now patient is going to continue with symptomatic control of Raynaud's with warming hands and toes as needed.

## 2021-11-08 NOTE — ASSESSMENT & PLAN NOTE
Controlled with monthly Aimovig and verapamil  mg daily. She continues to follow with neurology. She does take Maxalt as needed for migraines that feels very fatigued after taking Maxalt. We will try her on Nurtec to see if she has better symptom relief without side effects.

## 2021-11-08 NOTE — ASSESSMENT & PLAN NOTE
No problems identified on exam.  Follows with Dr. Pool Kohler for GYN care. Is up-to-date on colonoscopies with history of colon polyps dating back to her 25s. She is scheduled for a mammogram in December. Check fasting blood work. Patient will be due for colonoscopy in 2022. Patient has completed Pfizer COVID-19 vaccine series but is due for her booster. Instructed patient to get booster. She got her flu shot at her allergist office last week. Encouraged exercise.

## 2021-11-19 DIAGNOSIS — G43.109 MIGRAINE WITH AURA AND WITHOUT STATUS MIGRAINOSUS, NOT INTRACTABLE: ICD-10-CM

## 2021-11-19 RX ORDER — RIZATRIPTAN BENZOATE 5 MG/1
TABLET, ORALLY DISINTEGRATING ORAL
Qty: 9 TABLET | Refills: 0 | Status: SHIPPED | OUTPATIENT
Start: 2021-11-19

## 2021-12-04 ENCOUNTER — HOSPITAL ENCOUNTER (OUTPATIENT)
Dept: WOMENS IMAGING | Age: 51
Discharge: HOME | End: 2021-12-04
Payer: COMMERCIAL

## 2021-12-04 VITALS — BODY MASS INDEX: 26.33 KG/M2 | WEIGHT: 158 LBS | HEIGHT: 65 IN

## 2021-12-04 DIAGNOSIS — Z12.31 VISIT FOR SCREENING MAMMOGRAM: ICD-10-CM

## 2021-12-04 PROCEDURE — 77063 BREAST TOMOSYNTHESIS BI: CPT

## 2021-12-07 DIAGNOSIS — Z23 NEED FOR INFLUENZA VACCINATION: ICD-10-CM

## 2021-12-07 PROBLEM — Z00.00 WELL ADULT EXAM: Status: RESOLVED | Noted: 2020-09-25 | Resolved: 2021-12-07

## 2021-12-07 RX ORDER — ALBUTEROL SULFATE 90 UG/1
AEROSOL, METERED RESPIRATORY (INHALATION)
Qty: 6.7 EACH | Refills: 1 | Status: SHIPPED | OUTPATIENT
Start: 2021-12-07

## 2022-02-01 ENCOUNTER — OFFICE VISIT (OUTPATIENT)
Dept: NEUROLOGY | Age: 52
End: 2022-02-01
Payer: COMMERCIAL

## 2022-02-01 VITALS
BODY MASS INDEX: 26.33 KG/M2 | HEART RATE: 64 BPM | DIASTOLIC BLOOD PRESSURE: 83 MMHG | SYSTOLIC BLOOD PRESSURE: 134 MMHG | HEIGHT: 65 IN | WEIGHT: 158 LBS

## 2022-02-01 DIAGNOSIS — G43.109 MIGRAINE WITH AURA AND WITHOUT STATUS MIGRAINOSUS, NOT INTRACTABLE: ICD-10-CM

## 2022-02-01 PROCEDURE — 99213 OFFICE O/P EST LOW 20 MIN: CPT | Performed by: PSYCHIATRY & NEUROLOGY

## 2022-02-01 RX ORDER — ERENUMAB-AOOE 70 MG/ML
INJECTION SUBCUTANEOUS
Qty: 1 PEN | Refills: 5 | Status: SHIPPED | OUTPATIENT
Start: 2022-02-01 | End: 2022-08-03

## 2022-02-01 RX ORDER — RIMEGEPANT SULFATE 75 MG/75MG
TABLET, ORALLY DISINTEGRATING ORAL
Qty: 8 TABLET | Refills: 0 | Status: SHIPPED | OUTPATIENT
Start: 2022-02-01 | End: 2022-08-03 | Stop reason: SDUPTHER

## 2022-02-01 NOTE — PROGRESS NOTES
Darline Screen   Neurology followup    Subjective:   CC/HP  History was obtained from the patient. Interval history:   Patient states that she is doing fairly well. She still gets 1-2 headaches a month for which she takes Maxalt but she feels in general her migraines are fairly stable. She tried Nurtec from her primary care physician and it seemed to work very well. She would like a refill on that as well. She feels that the Aimovig injections are working reasonably well. Patient cannot take topiramate because of history of kidney stones. She was unable to tolerate propranolol. Detailed history:  Patient states that she had headaches when she was very young during school days but then they got better. [facility] in the last few years they have come back and in the last few months they have been worse.  Now she gets headaches almost once every week. Aretha Hones can last for several hours.  Headaches are throbbing in nature.  She has scintillating scotoma with severe headaches. Wilton Vinson is a family history of headaches in her mother and grandmother  She had an episode of focal visual hemianopsia last week along with a scintillating scotoma when she had her severe headache. Bright light and loud noise make the headaches worse.  No clear relieving factors.     REVIEW OF SYSTEMS    Constitutional:  []   Chills   []  Fatigue   []  Fevers   []  Malaise   []  Weight loss     [x] Denies all of the above    Respiratory:   []  Cough    []  Shortness of breath         [x] Denies all of the above     Cardiovascular:   []  Chest pain    []  Exertional chest pressure/discomfort           [] Palpitations    []  Syncope     [x] Denies all of the above        Past Medical History:   Diagnosis Date    Allergic rhinitis     Kidney stone     Migraine      Family History   Problem Relation Age of Onset    Diabetes Mother     High Blood Pressure Mother     High Blood Pressure Father     High Blood Pressure Maternal Grandfather  Diabetes Maternal Grandfather     Heart Disease Maternal Grandfather     Colon Cancer Paternal Grandmother         61    Heart Disease Maternal Cousin         pacemaker    Migraines Daughter      Social History     Socioeconomic History    Marital status:      Spouse name: Not on file    Number of children: Not on file    Years of education: Not on file    Highest education level: Not on file   Occupational History    Not on file   Tobacco Use    Smoking status: Never Smoker    Smokeless tobacco: Never Used   Vaping Use    Vaping Use: Never used   Substance and Sexual Activity    Alcohol use: Yes     Comment: occasionallly    Drug use: Never    Sexual activity: Not on file   Other Topics Concern    Not on file   Social History Narrative    Not on file     Social Determinants of Health     Financial Resource Strain:     Difficulty of Paying Living Expenses: Not on file   Food Insecurity:     Worried About Running Out of Food in the Last Year: Not on file    Desire of Food in the Last Year: Not on file   Transportation Needs:     Lack of Transportation (Medical): Not on file    Lack of Transportation (Non-Medical):  Not on file   Physical Activity:     Days of Exercise per Week: Not on file    Minutes of Exercise per Session: Not on file   Stress:     Feeling of Stress : Not on file   Social Connections:     Frequency of Communication with Friends and Family: Not on file    Frequency of Social Gatherings with Friends and Family: Not on file    Attends [employer] Services: Not on file    Active Member of Clubs or Organizations: Not on file    Attends Club or Organization Meetings: Not on file    Marital Status: Not on file   Intimate Partner Violence:     Fear of Current or Ex-Partner: Not on file    Emotionally Abused: Not on file    Physically Abused: Not on file    Sexually Abused: Not on file   Housing Stability:     Unable to Pay for Housing in the Last Year: Not on file    Number of Places Lived in the Last Year: Not on file    Unstable Housing in the Last Year: Not on file        Objective:  Exam:  /83   Pulse 64   Ht 5' 5\" (1.651 m)   Wt 158 lb (71.7 kg)   BMI 26.29 kg/m²   Patient is awake, alert and oriented x3. Speech is normal.  Pupils are equal round reacting to light. Extraocular movements intact. Face symmetrical. Tongue midline. Motor exam shows normal symmetrical strength. Deep tendon reflexes normal. Plantar reflexes downgoing. Sensory exam normal. Coordination normal. Gait normal. No carotid bruit. No neck stiffness. Data :  LABS:  General Labs:    CBC:   Lab Results   Component Value Date    WBC 6.7 09/25/2020    RBC 4.55 09/25/2020    HGB 14.3 09/25/2020    HCT 43.0 09/25/2020    MCV 94.7 09/25/2020    MCH 31.5 09/25/2020    MCHC 33.3 09/25/2020    RDW 12.4 09/25/2020     09/25/2020    MPV 7.6 09/25/2020     BMP:    Lab Results   Component Value Date     09/25/2020    K 4.3 09/25/2020     09/25/2020    CO2 25 09/25/2020    BUN 15 09/25/2020    LABALBU 4.8 09/25/2020    CREATININE 0.8 09/25/2020    CALCIUM 9.4 09/25/2020    GFRAA >60 09/25/2020    LABGLOM >60 09/25/2020    GLUCOSE 99 09/25/2020       Impression :  Intractable migraines, now doing better on Aimovig  She takes occasional Maxalt  She is also on verapamil from her primary care physician    Plan :  Continue Aimovig  I also refilled her Nurtec #10 today to be taken on a as needed basis. Return in 6 months        Please note a portion of  this chart was generated using dragon dictation software. Although every effort was made to ensure the accuracy of this automated transcription, some errors in transcription may have occurred.      Pursuant to the emergency declaration under the [facility], [facility]5 waiver authority and the [employer] General Act, this Virtual  Visit was conducted, with patient's consent, to reduce the patient's risk of exposure to COVID-19 and provide continuity of care for an established patient. Services were provided through a video synchronous discussion virtually to substitute for in-person clinic visit.

## 2022-02-09 DIAGNOSIS — G43.109 MIGRAINE WITH AURA AND WITHOUT STATUS MIGRAINOSUS, NOT INTRACTABLE: Primary | ICD-10-CM

## 2022-02-09 RX ORDER — FREMANEZUMAB-VFRM 225 MG/1.5ML
INJECTION SUBCUTANEOUS
Qty: 1 PEN | Refills: 2 | Status: SHIPPED | OUTPATIENT
Start: 2022-02-09 | End: 2022-07-20

## 2022-04-01 ENCOUNTER — OFFICE VISIT (OUTPATIENT)
Dept: INTERNAL MEDICINE CLINIC | Age: 52
End: 2022-04-01
Payer: COMMERCIAL

## 2022-04-01 VITALS
BODY MASS INDEX: 26.99 KG/M2 | HEART RATE: 90 BPM | DIASTOLIC BLOOD PRESSURE: 78 MMHG | OXYGEN SATURATION: 98 % | SYSTOLIC BLOOD PRESSURE: 112 MMHG | TEMPERATURE: 97.2 F | WEIGHT: 162 LBS | HEIGHT: 65 IN

## 2022-04-01 DIAGNOSIS — L03.011 INFECTION OF NAIL BED OF FINGER OF RIGHT HAND: Primary | ICD-10-CM

## 2022-04-01 PROCEDURE — 99213 OFFICE O/P EST LOW 20 MIN: CPT | Performed by: NURSE PRACTITIONER

## 2022-04-01 SDOH — ECONOMIC STABILITY: FOOD INSECURITY: WITHIN THE PAST 12 MONTHS, THE FOOD YOU BOUGHT JUST DIDN'T LAST AND YOU DIDN'T HAVE MONEY TO GET MORE.: NEVER TRUE

## 2022-04-01 SDOH — ECONOMIC STABILITY: FOOD INSECURITY: WITHIN THE PAST 12 MONTHS, YOU WORRIED THAT YOUR FOOD WOULD RUN OUT BEFORE YOU GOT MONEY TO BUY MORE.: NEVER TRUE

## 2022-04-01 ASSESSMENT — ENCOUNTER SYMPTOMS
WHEEZING: 0
SHORTNESS OF BREATH: 0
COUGH: 0

## 2022-04-01 ASSESSMENT — SOCIAL DETERMINANTS OF HEALTH (SDOH): HOW HARD IS IT FOR YOU TO PAY FOR THE VERY BASICS LIKE FOOD, HOUSING, MEDICAL CARE, AND HEATING?: NOT HARD AT ALL

## 2022-04-01 NOTE — PROGRESS NOTES
mouth every 6 hours as needed for Pain       No current facility-administered medications for this visit. Review of Systems   Constitutional: Negative for chills, fatigue and fever. Respiratory: Negative for cough, shortness of breath and wheezing. Cardiovascular: Negative for chest pain, palpitations and leg swelling. Skin: Positive for wound. Negative for pallor and rash. Neurological: Negative for dizziness, weakness, light-headedness, numbness and headaches. OBJECTIVE:  /78   Pulse 90   Temp 97.2 °F (36.2 °C)   Ht 5' 5\" (1.651 m)   Wt 162 lb (73.5 kg)   SpO2 98%   BMI 26.96 kg/m²    Physical Exam  Vitals reviewed. Constitutional:       General: She is not in acute distress. Appearance: She is well-developed. She is not diaphoretic. HENT:      Head: Normocephalic and atraumatic. Cardiovascular:      Rate and Rhythm: Normal rate. Pulmonary:      Effort: Pulmonary effort is normal. No respiratory distress. Skin:     General: Skin is warm and dry. Comments: Redness around the base of the nailbed of the right third finger   Neurological:      Mental Status: She is alert and oriented to person, place, and time. Coordination: Coordination normal.        ASSESSMENT/PLAN:  Alonso Bell was seen today for finger pain. Diagnoses and all orders for this visit:    Infection of nail bed of finger of right hand   - Redness around the base of the nailbed of the right third finger. Small scab noted to the right fourth finger. No open wounds/drainage. No fevers/chills. ROM normal. Pulses normal.    - Recommend soap/water and triple antibiotic ointment twice daily. - Symptomatic management reviewed. - Pt will call if symptoms worsen or fail to improve  - Red flag warning signs reviewed with the pt and she will go to the ER if these occur. Return for as previously scheduled or sooner if needed. Pt informed to call if symptoms worsen or fail to improve.   All questions answered. Patient states no further questions or concerns at this time.     Electronically signed by Mortimer Kub, APRN - CNP 04/01/22

## 2022-05-05 ENCOUNTER — OFFICE VISIT (OUTPATIENT)
Dept: INTERNAL MEDICINE CLINIC | Age: 52
End: 2022-05-05
Payer: COMMERCIAL

## 2022-05-05 VITALS
HEIGHT: 65 IN | WEIGHT: 159.2 LBS | HEART RATE: 73 BPM | BODY MASS INDEX: 26.52 KG/M2 | DIASTOLIC BLOOD PRESSURE: 72 MMHG | SYSTOLIC BLOOD PRESSURE: 124 MMHG | OXYGEN SATURATION: 99 %

## 2022-05-05 DIAGNOSIS — B36.9 FUNGAL SKIN DISEASE: Primary | ICD-10-CM

## 2022-05-05 PROCEDURE — 99213 OFFICE O/P EST LOW 20 MIN: CPT | Performed by: INTERNAL MEDICINE

## 2022-05-05 RX ORDER — CLOTRIMAZOLE AND BETAMETHASONE DIPROPIONATE 10; .64 MG/G; MG/G
CREAM TOPICAL
Qty: 45 G | Refills: 1 | Status: SHIPPED | OUTPATIENT
Start: 2022-05-05

## 2022-05-05 NOTE — PROGRESS NOTES
Dee Cross (:  1970) is a 47 y.o. female,New patient, here for evaluation of the following chief complaint(s):  Rash (pt c/o it itching , burnnig x 2 day around thigh and arm )         ASSESSMENT/PLAN:  1. Fungal skin disease  -     clotrimazole-betamethasone (LOTRISONE) 1-0.05 % cream; Apply topically 2 times daily. , Disp-45 g, R-1, Normal  Patient states contact dermatitis versus\" presumed HSV next 5 to 7 days patient is to apply a very small amount so that we will get the skin discoloration    No follow-ups on file.          Subjective   SUBJECTIVE/OBJECTIVE:    Lab Review   Lab Results   Component Value Date     2020     2019     04/10/2018    K 4.3 2020    K 4.9 2019    K 4.3 04/10/2018    CO2 25 2020    CO2 25 2019    CO2 24 04/10/2018    BUN 15 2020    BUN 14 2019    BUN 13 04/10/2018    CREATININE 0.8 2020    CREATININE 0.8 2019    CREATININE 0.8 04/10/2018    GLUCOSE 99 2020    GLUCOSE 93 2019    GLUCOSE 100 04/10/2018    CALCIUM 9.4 2020    CALCIUM 9.5 2019    CALCIUM 9.0 04/10/2018     Lab Results   Component Value Date    WBC 6.7 2020    WBC 7.8 2019    WBC 8.3 04/10/2018    HGB 14.3 2020    HGB 14.4 2019    HGB 13.0 04/10/2018    HCT 43.0 2020    HCT 44.2 2019    HCT 39.1 04/10/2018    MCV 94.7 2020    MCV 96.3 2019    MCV 96.3 04/10/2018     2020     2019     04/10/2018     Lab Results   Component Value Date    CHOL 187 2020    CHOL 208 2019    CHOL 207 04/10/2018    TRIG 62 2020    TRIG 152 2019    TRIG 170 04/10/2018    HDL 63 2020    HDL 62 2019    HDL 56 04/10/2018       Vitals 2022 2022 0/3/1533   SYSTOLIC 338 960 363   DIASTOLIC 72 78 83   Site - - -   Position - - -   Cuff Size - - -   Pulse 73 90 64   Temp - 97.2 -   Resp - - -   SpO2 99 98 -   Weight 159 lb 3.2 oz 162 lb 158 lb   Height 5' 5\" 5' 5\" 5' 5\"   Body mass index 26.49 kg/m2 26.96 kg/m2 26.29 kg/m2   Pain Level - - -   Some recent data might be hidden       Rash  This is a new problem. The current episode started yesterday. The affected locations include the right upper leg and left elbow. Review of Systems   Skin: Positive for rash. Objective   Physical Exam  Vitals and nursing note reviewed. Constitutional:       Appearance: Normal appearance. She is normal weight. Skin:         Neurological:      Mental Status: She is alert. This dictation was generated by voice recognition computer software. Although all attempts are made to edit the dictation for accuracy, there may be errors in the transcription that are not intended. An electronic signature was used to authenticate this note.     --Minesh Domingo MD

## 2022-05-06 ENCOUNTER — PATIENT MESSAGE (OUTPATIENT)
Dept: INTERNAL MEDICINE CLINIC | Age: 52
End: 2022-05-06

## 2022-05-06 RX ORDER — SULFAMETHOXAZOLE AND TRIMETHOPRIM 800; 160 MG/1; MG/1
1 TABLET ORAL 2 TIMES DAILY
Qty: 14 TABLET | Refills: 0 | Status: SHIPPED | OUTPATIENT
Start: 2022-05-06 | End: 2022-05-13

## 2022-05-06 NOTE — TELEPHONE ENCOUNTER
From: Delvin Chamorro  To: Dr. Mirtha Lala: 5/6/2022 9:54 AM EDT  Subject: Hedy Goodrich. Thanks for taking my call thus morning. I have attached 2 pictures for you. The leg started seeping last night dripping down leg. I showered last night and this morning to clean spots and applied the cream afterwards. The arm has a little seepage but not like leg. The spot on arm has not gotten bigger but the leg is spreading with swelling. Please advise. Thank you for your time.

## 2022-07-16 DIAGNOSIS — G43.109 MIGRAINE WITH AURA AND WITHOUT STATUS MIGRAINOSUS, NOT INTRACTABLE: ICD-10-CM

## 2022-07-20 RX ORDER — FREMANEZUMAB-VFRM 225 MG/1.5ML
INJECTION SUBCUTANEOUS
Qty: 0.5 ML | Refills: 0 | Status: SHIPPED | OUTPATIENT
Start: 2022-07-20 | End: 2022-08-03 | Stop reason: SDUPTHER

## 2022-07-20 NOTE — TELEPHONE ENCOUNTER
30 day supply until visit    Medication:   Requested Prescriptions     Pending Prescriptions Disp Refills    AJOVY 225 MG/1.5ML SOAJ [Pharmacy Med Name: Ameliajonatan Macaanthony 225 MG/1.5 ML AUTOINJECT]  2     Sig: INJECT 1.5 ML SUBCUTANEOUSLY EVERY 30 DAYS        Last Filled:  2/9/22    Patient Phone Number: [phone] (home)     Last appt: 2/1/2022   Next appt: 8/3/2022

## 2022-08-03 ENCOUNTER — OFFICE VISIT (OUTPATIENT)
Dept: NEUROLOGY | Age: 52
End: 2022-08-03
Payer: COMMERCIAL

## 2022-08-03 VITALS
BODY MASS INDEX: 26.49 KG/M2 | DIASTOLIC BLOOD PRESSURE: 80 MMHG | HEIGHT: 65 IN | WEIGHT: 159 LBS | HEART RATE: 74 BPM | SYSTOLIC BLOOD PRESSURE: 117 MMHG

## 2022-08-03 DIAGNOSIS — G43.109 MIGRAINE WITH AURA AND WITHOUT STATUS MIGRAINOSUS, NOT INTRACTABLE: ICD-10-CM

## 2022-08-03 PROCEDURE — 99213 OFFICE O/P EST LOW 20 MIN: CPT | Performed by: PSYCHIATRY & NEUROLOGY

## 2022-08-03 RX ORDER — RIMEGEPANT SULFATE 75 MG/75MG
TABLET, ORALLY DISINTEGRATING ORAL
Qty: 8 TABLET | Refills: 1 | Status: SHIPPED | OUTPATIENT
Start: 2022-08-03

## 2022-08-03 RX ORDER — ONDANSETRON 8 MG/1
8 TABLET, ORALLY DISINTEGRATING ORAL EVERY 8 HOURS PRN
Qty: 10 TABLET | Refills: 1 | Status: CANCELLED | OUTPATIENT
Start: 2022-08-03

## 2022-08-03 RX ORDER — FREMANEZUMAB-VFRM 225 MG/1.5ML
INJECTION SUBCUTANEOUS
Qty: 3 PEN | Refills: 1 | Status: SHIPPED | OUTPATIENT
Start: 2022-08-03

## 2022-08-03 NOTE — PROGRESS NOTES
pacemaker    Migraines Daughter      Social History     Socioeconomic History    Marital status:      Spouse name: None    Number of children: None    Years of education: None    Highest education level: None   Tobacco Use    Smoking status: Never    Smokeless tobacco: Never   Vaping Use    Vaping Use: Never used   Substance and Sexual Activity    Alcohol use: Yes     Comment: occasionallly    Drug use: Never     Social Determinants of Health     Financial Resource Strain: Low Risk     Difficulty of Paying Living Expenses: Not hard at all   Food Insecurity: No Food Insecurity    Worried About Running Out of Food in the Last Year: Never true    Ran Out of Food in the Last Year: Never true        Objective:  Exam:  /80   Pulse 74   Ht 5' 5\" (1.651 m)   Wt 159 lb (72.1 kg)   BMI 26.46 kg/m²   Patient is awake, alert and oriented x3. Speech is normal.  Pupils are equal round reacting to light. Extraocular movements intact. Face symmetrical. Tongue midline. Motor exam shows normal symmetrical strength. Deep tendon reflexes normal. Plantar reflexes downgoing. Sensory exam normal. Coordination normal. Gait normal. No carotid bruit. No neck stiffness.       Data :  LABS:  General Labs:    CBC:   Lab Results   Component Value Date/Time    WBC 6.7 09/25/2020 09:06 AM    RBC 4.55 09/25/2020 09:06 AM    HGB 14.3 09/25/2020 09:06 AM    HCT 43.0 09/25/2020 09:06 AM    MCV 94.7 09/25/2020 09:06 AM    MCH 31.5 09/25/2020 09:06 AM    MCHC 33.3 09/25/2020 09:06 AM    RDW 12.4 09/25/2020 09:06 AM     09/25/2020 09:06 AM    MPV 7.6 09/25/2020 09:06 AM     BMP:    Lab Results   Component Value Date/Time     09/25/2020 09:06 AM    K 4.3 09/25/2020 09:06 AM     09/25/2020 09:06 AM    CO2 25 09/25/2020 09:06 AM    BUN 15 09/25/2020 09:06 AM    LABALBU 4.8 09/25/2020 09:06 AM    CREATININE 0.8 09/25/2020 09:06 AM    CALCIUM 9.4 09/25/2020 09:06 AM    GFRAA >60 09/25/2020 09:06 AM    LABGLOM >60 09/25/2020 09:06 AM    GLUCOSE 99 09/25/2020 09:06 AM       Impression :  Intractable migraines, now doing better on Ajovy  She takes an occasional Nurtec for the breakthrough headaches. She is also on verapamil from her primary care physician    Plan :  Continue Ajovy injections every 30 days  I also refilled her Nurtec  today to be taken on a as needed basis. Return in 6 months        Please note a portion of  this chart was generated using dragon dictation software. Although every effort was made to ensure the accuracy of this automated transcription, some errors in transcription may have occurred. Pursuant to the emergency declaration under the [facility], [facility] waiver authority and the [facility] and [employer] Act, this Virtual  Visit was conducted, with patient's consent, to reduce the patient's risk of exposure to COVID-19 and provide continuity of care for an established patient. Services were provided through a video synchronous discussion virtually to substitute for in-person clinic visit.

## 2022-10-25 DIAGNOSIS — G43.109 MIGRAINE WITH AURA AND WITHOUT STATUS MIGRAINOSUS, NOT INTRACTABLE: ICD-10-CM

## 2022-11-01 ENCOUNTER — TELEPHONE (OUTPATIENT)
Dept: NEUROLOGY | Age: 52
End: 2022-11-01

## 2022-11-01 NOTE — TELEPHONE ENCOUNTER
Pt called in and stated that she was using a savings card for her Javier that was assisting her with getting the medication for $10 a month. She has since been notified that the savings card is no longer available and insurance is wanting her to pay almost $500 out of pocket. Being that it is pretty costly for her she is wanting to know if something can be sent to her insurance explaining that she needs the medication or if she can go on a previous preventable injection medication (pt uncertain what she was on before). Please call pt at [phone]. Pharmacy on file has been confirmed.

## 2022-11-04 NOTE — TELEPHONE ENCOUNTER
Pt given Ajovy samples until next appt. Dr Amy Phan will discuss further recommendations at that time.

## 2022-12-07 ENCOUNTER — OFFICE VISIT (OUTPATIENT)
Dept: NEUROLOGY | Age: 52
End: 2022-12-07
Payer: COMMERCIAL

## 2022-12-07 VITALS
HEIGHT: 65 IN | SYSTOLIC BLOOD PRESSURE: 127 MMHG | WEIGHT: 159 LBS | BODY MASS INDEX: 26.49 KG/M2 | HEART RATE: 94 BPM | DIASTOLIC BLOOD PRESSURE: 78 MMHG

## 2022-12-07 DIAGNOSIS — G43.109 MIGRAINE WITH AURA AND WITHOUT STATUS MIGRAINOSUS, NOT INTRACTABLE: Primary | ICD-10-CM

## 2022-12-07 DIAGNOSIS — M54.2 MUSCULOSKELETAL NECK PAIN: ICD-10-CM

## 2022-12-07 DIAGNOSIS — G44.209 MUSCLE CONTRACTION HEADACHE: ICD-10-CM

## 2022-12-07 PROCEDURE — 99214 OFFICE O/P EST MOD 30 MIN: CPT | Performed by: PSYCHIATRY & NEUROLOGY

## 2022-12-07 RX ORDER — RIMEGEPANT SULFATE 75 MG/75MG
TABLET, ORALLY DISINTEGRATING ORAL
Qty: 8 TABLET | Refills: 1 | Status: SHIPPED | OUTPATIENT
Start: 2022-12-07

## 2022-12-07 RX ORDER — AMITRIPTYLINE HYDROCHLORIDE 10 MG/1
TABLET, FILM COATED ORAL
Qty: 60 TABLET | Refills: 2 | Status: SHIPPED | OUTPATIENT
Start: 2022-12-07

## 2022-12-07 RX ORDER — FREMANEZUMAB-VFRM 225 MG/1.5ML
INJECTION SUBCUTANEOUS
Qty: 3 ADJUSTABLE DOSE PRE-FILLED PEN SYRINGE | Refills: 1 | Status: SHIPPED | OUTPATIENT
Start: 2022-12-07

## 2022-12-07 RX ORDER — ONDANSETRON 8 MG/1
8 TABLET, ORALLY DISINTEGRATING ORAL EVERY 8 HOURS PRN
Qty: 10 TABLET | Refills: 1 | Status: SHIPPED | OUTPATIENT
Start: 2022-12-07

## 2022-12-07 NOTE — PROGRESS NOTES
Leanne Kohli   Neurology followup    Subjective:   CC/HP  History was obtained from the patient. Interval history:   Patient states that her migraine headaches are doing reasonably well. She gets maybe 1 headache a month. She is taking Ajovy and it seems to work fairly well for those headaches. However she also gets more frequent dull headaches. She feels that she has some neck discomfort and does not sleep well at night. She has been told that she snores some at night. She may not feel rested in the morning and some days that she takes daytime naps. She takes her Nurtec for the breakthrough migraines. Patient cannot take topiramate because of history of kidney stones. She was unable to tolerate propranolol. Detailed history:  Patient states that she had headaches when she was very young during school days but then they got better. However in the last few years they have come back and in the last few months they have been worse. Now she gets headaches almost once every week. They can last for several hours. Headaches are throbbing in nature. She has scintillating scotoma with severe headaches. There is a family history of headaches in her mother and grandmother  She had an episode of focal visual hemianopsia last week along with a scintillating scotoma when she had her severe headache. Bright light and loud noise make the headaches worse. No clear relieving factors.     REVIEW OF SYSTEMS    Constitutional:  []   Chills   []  Fatigue   []  Fevers   []  Malaise   []  Weight loss     [x] Denies all of the above    Respiratory:   []  Cough    []  Shortness of breath         [x] Denies all of the above     Cardiovascular:   []  Chest pain    []  Exertional chest pressure/discomfort           [] Palpitations    []  Syncope     [x] Denies all of the above        Past Medical History:   Diagnosis Date    Allergic rhinitis     Kidney stone     Migraine      Family History   Problem Relation Age of Onset    Diabetes Mother     High Blood Pressure Mother     High Blood Pressure Father     High Blood Pressure Maternal Grandfather     Diabetes Maternal Grandfather     Heart Disease Maternal Grandfather     Colon Cancer Paternal Grandmother         61    Heart Disease Maternal Cousin         pacemaker    Migraines Daughter      Social History     Socioeconomic History    Marital status:      Spouse name: None    Number of children: None    Years of education: None    Highest education level: None   Tobacco Use    Smoking status: Never    Smokeless tobacco: Never   Vaping Use    Vaping Use: Never used   Substance and Sexual Activity    Alcohol use: Yes     Comment: occasionallly    Drug use: Never     Social Determinants of Health     Financial Resource Strain: Low Risk     Difficulty of Paying Living Expenses: Not hard at all   Food Insecurity: No Food Insecurity    Worried About Running Out of Food in the Last Year: Never true    Ran Out of Food in the Last Year: Never true        Objective:  Exam:  /78   Pulse 94   Ht 5' 5\" (1.651 m)   Wt 159 lb (72.1 kg)   BMI 26.46 kg/m²   Patient is awake, alert and oriented x3. Speech is normal.  Pupils are equal round reacting to light. Extraocular movements intact. Face symmetrical. Tongue midline. Motor exam shows normal symmetrical strength. Deep tendon reflexes normal. Plantar reflexes downgoing. Sensory exam normal. Coordination normal. Gait normal. No carotid bruit. No neck stiffness.       Data :  LABS:  General Labs:    CBC:   Lab Results   Component Value Date/Time    WBC 6.7 09/25/2020 09:06 AM    RBC 4.55 09/25/2020 09:06 AM    HGB 14.3 09/25/2020 09:06 AM    HCT 43.0 09/25/2020 09:06 AM    MCV 94.7 09/25/2020 09:06 AM    MCH 31.5 09/25/2020 09:06 AM    MCHC 33.3 09/25/2020 09:06 AM    RDW 12.4 09/25/2020 09:06 AM     09/25/2020 09:06 AM    MPV 7.6 09/25/2020 09:06 AM     BMP:    Lab Results   Component Value Date/Time     09/25/2020 09:06 AM    K 4.3 09/25/2020 09:06 AM     09/25/2020 09:06 AM    CO2 25 09/25/2020 09:06 AM    BUN 15 09/25/2020 09:06 AM    LABALBU 4.8 09/25/2020 09:06 AM    CREATININE 0.8 09/25/2020 09:06 AM    CALCIUM 9.4 09/25/2020 09:06 AM    GFRAA >60 09/25/2020 09:06 AM    LABGLOM >60 09/25/2020 09:06 AM    GLUCOSE 99 09/25/2020 09:06 AM       Impression :  Intractable migraines, now doing better on Ajovy  She takes an occasional Nurtec for the breakthrough headaches. There may be some element of muscle contraction headaches as well. These are not well controlled. Musculoskeletal neck pain  I am also concerned about possible obstructive sleep apnea syndrome  She is also on verapamil from her primary care physician    Plan :  Continue Ajovy injections every 30 days  I also refilled her Nurtec  today to be taken on a as needed basis. I will refill Zofran to take when she has nausea with the migraines  I will start her on low-dose amitriptyline  Side effects were discussed. I will see her back in 3 months. We may try tizanidine if there is no improvement and that then we may even consider a sleep study at some point. Please note a portion of  this chart was generated using dragon dictation software. Although every effort was made to ensure the accuracy of this automated transcription, some errors in transcription may have occurred. Pursuant to the emergency declaration under the [address] [place] [address] authority and the [employer] and [employer] Act, this Virtual  Visit was conducted, with patient's consent, to reduce the patient's risk of exposure to COVID-19 and provide continuity of care for an established patient. Services were provided through a video synchronous discussion virtually to substitute for in-person clinic visit.

## 2023-01-03 RX ORDER — AMITRIPTYLINE HYDROCHLORIDE 10 MG/1
TABLET, FILM COATED ORAL
Qty: 60 TABLET | Refills: 2 | OUTPATIENT
Start: 2023-01-03

## 2023-01-03 NOTE — TELEPHONE ENCOUNTER
Appt 3/16/23    amitriptyline (ELAVIL) 10 MG tablet 60 tablet 2 12/7/2022     Sig: Take 1 tablet at night for 1 week and then 2 tablets at night    Sent to pharmacy as: Amitriptyline HCl 10 MG Oral Tablet (ELAVIL)    E-Prescribing Status: [id number] [place] - [address] Receipt confirmed by pharmacy (12/7/2022  9:18 AM EST)

## 2023-02-03 ENCOUNTER — SCHEDULED TELEPHONE ENCOUNTER (OUTPATIENT)
Dept: PRIMARY CARE CLINIC | Age: 53
End: 2023-02-03
Payer: COMMERCIAL

## 2023-02-03 DIAGNOSIS — U07.1 COVID-19: Primary | ICD-10-CM

## 2023-02-03 PROCEDURE — 99443 PR PHYS/QHP TELEPHONE EVALUATION 21-30 MIN: CPT | Performed by: NURSE PRACTITIONER

## 2023-02-03 NOTE — LETTER
I had the pleasure of seeing Neena Davila today for a primary care Virtualist video visit. Our team would love your overall feedback on this visit. Please hit shift and click the following link to let us know if the Virtualist service met your expectations. [facility]. [email or web address]      Electronically signed by LARA Lee CNP on 2/3/23 at 1:20 PM EST.

## 2023-02-03 NOTE — PATIENT INSTRUCTIONS
Your recent COVID test was positive. You are a candidate for Paxlovid. Allergies, Medications, and labs reviewed. Call if you have any dizziness or decrease in BP as Adjustment of Verapamil may be indicated. Monitor. Since for Migraines consider stopping for the time taking Paxlovid and restart 3 days after since taking for Migraines. Rebound and side effects reviewed. Currently, the CDC recommends staying at home in self isolation for at least 5 days. After that, if you are without a fever and symptoms are improving, you may go out, but only if wearing a mask for an additional 5 days. Thankfully, most people recover uneventfully. The mainstay of treatment for most people remains focused on symptom control, isolating and watching for signs of worsening illness. You should drink plenty of fluids, eat when you are able, and rest as much as possible. Recommend treating symptoms with OTC medications: Flonase for congestions, Mucinex for Phlegm, Robitussin DM or Delsym for cough, Tylenol/Ibuprofen for fever/body aches. Recommend Vitamin D, C and Zinc.    We do not prescribe antibiotics for viral illnesses; however, we can treat secondary infections should the need arise. Please seek more urgent medical attention if you develop any of the following:  Chest pain  Shortness of breath  Bluish lips or face  Severe headache   Severe dizziness or passing out  New confusion  Inability to stay awake  Extreme weakness    If you have a pulse oximeter, check it at least daily. Seek urgent medical attention if it drops to 92% or below.

## 2023-02-03 NOTE — PROGRESS NOTES
Jayla Zendejas is a 47 y.o. female evaluated via telephone on 2/3/2023 for Positive For Covid-19        Documentation:  I communicated with the patient and/or health care decision maker about Symptoms started yesterday with cough, sore throat and very fatigued and around midnight woke up with bad headache, body ache, no fever, +chills. Woke up early this morning and tested with COVID and+.  also positive couple days ago. Denies SOB. No wheezing or trouble with breathing. No CP. No N/V/D. H/O Asthma and Raynaud's. Details of this discussion including any medical advice provided:     1. COVID-19  Your recent COVID test was positive. You are a candidate for Paxlovid. Allergies, Medications, and labs reviewed. Adjust Verapamil if needed due to dizziness, decreased BP or stop for time taking Paxlovid and restart 3 days after since taking for Migraines. Rebound and side effects reviewed. Currently, the CDC recommends staying at home in self isolation for at least 5 days. After that, if you are without a fever and symptoms are improving, you may go out, but only if wearing a mask for an additional 5 days. Thankfully, most people recover uneventfully. The mainstay of treatment for most people remains focused on symptom control, isolating and watching for signs of worsening illness. You should drink plenty of fluids, eat when you are able, and rest as much as possible. Recommend treating symptoms with OTC medications: Flonase for congestions, Mucinex for Phlegm, Robitussin DM or Delsym for cough, Tylenol/Ibuprofen for fever/body aches. Recommend Vitamin D, C and Zinc.    We do not prescribe antibiotics for viral illnesses; however, we can treat secondary infections should the need arise.     Please seek more urgent medical attention if you develop any of the following:  Chest pain  Shortness of breath  Bluish lips or face  Severe headache   Severe dizziness or passing out  [place] confusion  Inability to stay awake  Extreme weakness    If you have a pulse oximeter, check it at least daily. Seek urgent medical attention if it drops to 92% or below. - nirmatrelvir/ritonavir 300/100 (PAXLOVID) 20 x 150 MG & 10 x 100MG TBPK; Take 3 tablets (two 150 mg nirmatrelvir and one 100 mg ritonavir tablets) by mouth every 12 hours for 5 days. Dispense: 30 tablet; Refill: 0    Return if symptoms worsen or fail to improve. Total Time: minutes: 21-30 minutes    Stefania Cross was evaluated through a synchronous (real-time) audio encounter. Patient identification was verified at the start of the visit. She (or guardian if applicable) is aware that this is a billable service, which includes applicable co-pays. This visit was conducted with the patient's (and/or legal guardian's) verbal consent. She has not had a related appointment within my department in the past 7 days or scheduled within the next 24 hours. The patient was located at Home: [address] [place] [address] [address] [place] [id number]. The provider was located at Other: HOME:FL .     Note: not billable if this call serves to triage the patient into an appointment for the relevant concern    LARA Henderson - CNP

## 2023-02-12 NOTE — PROGRESS NOTES
Patient: Stefania Cross is a 53 y.o. female who presents today with the following Chief Complaint(s):  Chief Complaint   Patient presents with    Annual Exam     No cc-        HPI      Here today for wellness visit.     Had COVID about 2 weeks ago. Feeling better but still fatigued.     Exercise- not currently with recent COVID. Is working on starting a new exercise program with a co-worker. Irregular.     Diet- wants to work on weight loss.  Looking into [employer] and portion control.     Alcohol- maybe 2-3/month.     Caffeine- at least 1 cup of coffee/day.     Water- trying to \"do better\".     Dentist- routine.     Mammogram 12/2021. Had to cancel d/t COVID and needs to call and r/s (was not allowed to r/s while she was ill).     Colonoscopy 2017. Due for follow up in 5 or 10 years. Last colonoscopy was normal.  Dr. Powers.    Overdue for pap smear. Was following with Dr. Valadez and needs a new gynecologist.     Migraines- following with Dr. Covarrubias. Insurance denied Ajovy as she also has Nurtec. Started on amitriptyline. Has been waking up with migraines. Feels like HA start from her neck. Migraines always seem to stem from her right occipital area. Has started going to [facility] which has been helpful but expensive.     Doesn't sleep great. Notices that she tosses and turns a lot. Not really snoring.  Does have chronic fatigue.    Is taking over-the-counter vitamin D3.    Allergies   Allergen Reactions    Zithromax [Azithromycin] Rash      Past Medical History:   Diagnosis Date    Allergic rhinitis     Asthma     Irritable bowel syndrome     Kidney stone     Migraine       Past Surgical History:   Procedure Laterality Date    HEMORRHOID SURGERY  2017    LITHOTRIPSY      x 2    OTHER SURGICAL HISTORY      anal fissure repair    PELVIC LAPAROSCOPY      endometriosis      Social History     Socioeconomic History    Marital status:      Spouse name: Not on file    Number of children: Not on file     Years of education: Not on file    Highest education level: Not on file   Occupational History    Not on file   Tobacco Use    Smoking status: Never    Smokeless tobacco: Never   Vaping Use    Vaping Use: Never used   Substance and Sexual Activity    Alcohol use: Yes     Alcohol/week: 2.0 standard drinks     Types: 1 Glasses of wine, 1 Drinks containing 0.5 oz of alcohol per week     Comment: Do not drink on a weekly basis    Drug use: Never    Sexual activity: Yes     Partners: Male   Other Topics Concern    Not on file   Social History Narrative    Not on file     Social Determinants of Health     Financial Resource Strain: Low Risk     Difficulty of Paying Living Expenses: Not hard at all   Food Insecurity: No Food Insecurity    Worried About Running Out of Food in the Last Year: Never true    [address] in the Last Year: Never true   Transportation Needs: Unknown    Lack of Transportation (Medical): Not on file    Lack of Transportation (Non-Medical):  No   Physical Activity: Not on file   Stress: Not on file   Social Connections: Not on file   Intimate Partner Violence: Not on file   Housing Stability: Unknown    Unable to Pay for Housing in the Last Year: Not on file    Number of Places Lived in the Last Year: Not on file    Unstable Housing in the Last Year: No     Family History   Problem Relation Age of Onset    Diabetes Mother     High Blood Pressure Mother     High Blood Pressure Father     High Blood Pressure Maternal Grandfather     Diabetes Maternal Grandfather     Heart Disease Maternal Grandfather     Colon Cancer Paternal Grandmother         61    Heart Disease Maternal Cousin         pacemaker    Migraines Daughter         Outpatient Medications Prior to Visit   Medication Sig Dispense Refill    Fremanezumab-vfrm (AJOVY) 225 MG/1.5ML SOAJ INJECT 1.5 ML SUBCUTANEOUSLY EVERY 30 DAYS 3 Adjustable Dose Pre-filled Pen Syringe 1    ondansetron (ZOFRAN ODT) 8 MG TBDP disintegrating tablet Place 1 tablet under the tongue every 8 hours as needed for Nausea or Vomiting 10 tablet 1    Rimegepant Sulfate (NURTEC) 75 MG TBDP Take 1 tablet at the onset of the severe headache. Not to exceed 1 tablet in 48 hours. 8 tablet 1    amitriptyline (ELAVIL) 10 MG tablet Take 1 tablet at night for 1 week and then 2 tablets at night 60 tablet 2    verapamil (CALAN SR) 120 MG extended release tablet TAKE 1 TABLET BY MOUTH EVERY EVENING 90 tablet 3    albuterol sulfate  (90 Base) MCG/ACT inhaler TAKE 2 PUFFS BY MOUTH EVERY 6 HOURS AS NEEDED FOR WHEEZE 6.7 each 1    olopatadine (PATANOL) 0.1 % ophthalmic solution 1 drop 2 times daily as needed       levocetirizine (XYZAL) 5 MG tablet Take 5 mg by mouth nightly      fluticasone (VERAMYST) 27.5 MCG/SPRAY nasal spray by Nasal route every morning      ibuprofen (ADVIL;MOTRIN) 200 MG tablet Take 200 mg by mouth every 6 hours as needed for Pain      clotrimazole-betamethasone (LOTRISONE) 1-0.05 % cream Apply topically 2 times daily. 45 g 1    Aspirin-Acetaminophen-Caffeine (EXCEDRIN PO) Take by mouth       No facility-administered medications prior to visit. Patient'spast medical history, surgical history, family history, medications,  and allergies  were all reviewed and updated as appropriate today. Review of Systems   Constitutional:  Negative for appetite change, fatigue and fever. Respiratory:  Negative for chest tightness and shortness of breath. Cardiovascular:  Negative for chest pain. Gastrointestinal:  Negative for constipation and diarrhea. Skin:  Negative for rash. /72   Pulse 80   Ht 5' 5\" (1.651 m)   Wt 157 lb 9.6 oz (71.5 kg)   SpO2 97%   BMI 26.23 kg/m²   Physical Exam  Vitals and nursing note reviewed. Constitutional:       Appearance: She is well-developed. She is not toxic-appearing. HENT:      Head: Normocephalic.       Right Ear: Tympanic membrane, ear canal and external ear normal.      Left Ear: Tympanic membrane, ear canal and external ear normal.      Mouth/Throat:      Pharynx: No oropharyngeal exudate or posterior oropharyngeal erythema. Eyes:      General: No scleral icterus. Extraocular Movements: Extraocular movements intact. Conjunctiva/sclera: Conjunctivae normal.      Pupils: Pupils are equal, round, and reactive to light. Neck:      Thyroid: No thyroid mass or thyromegaly. Vascular: No carotid bruit. Cardiovascular:      Rate and Rhythm: Normal rate and regular rhythm. Heart sounds: Normal heart sounds. No murmur heard. Pulmonary:      Effort: Pulmonary effort is normal.      Breath sounds: Normal breath sounds. Abdominal:      Palpations: Abdomen is soft. Tenderness: There is no abdominal tenderness. Musculoskeletal:      Right lower leg: No edema. Left lower leg: No edema. Lymphadenopathy:      Cervical: No cervical adenopathy. Neurological:      General: No focal deficit present. Mental Status: She is alert and oriented to person, place, and time. Psychiatric:         Mood and Affect: Mood normal.         Behavior: Behavior normal. Behavior is cooperative. ASSESSMENT/PLAN:    Problem List Items Addressed This Visit       Fatigue      Likely related to poor sleep. Was started on amitriptyline 10 mg nightly by Dr. Alfreda Alcantara in December. Has noticed that she does sleep better when she takes amitriptyline. Is a somewhat restless sleeper. Consider work-up for sleep apnea if fatigue does not improve with amitriptyline use. Relevant Orders    Vitamin B12 & Folate    Migraine with aura and without status migrainosus, not intractable     She continues to follow with Dr. Alfreda Alcantara for neurology. Was doing very well on verapamil  mg daily and monthly Aimovig and then Ajovy with Nurtec as needed. Insurance has now denied Ajovy. Does notice that her headaches seem to start in the right occipital area.   Will refer for physical therapy screen to see if she would benefit from PT. Polyp of colon      Most recent colonoscopy was normal in 2017 with  Dr. Mickie Joshi. Encourage patient to reach out to  Dr. Franny García office to see if she is due for a follow-up colonoscopy as she has a history of polyps. Vitamin D insufficiency      Taking over-the-counter vitamin D3. Check vitamin D level. Relevant Orders    Vitamin D 25 Hydroxy    Well adult exam - Primary      Overdue for mammogram-rescheduled due to recent COVID infection. She will check with  Dr. Franny García office to see when she is due for follow-up colonoscopy. Previously followed with Dr. Quin Lemon (who is left her position) for GYN care and will transition her care to 1 of Dr. Serena Castro partners. Check fasting blood work. Discussed diet and exercise habits. Recommend Shingrix vaccine in the near future. Recommend yearly flu shots and updated COVID vaccines (would not get current COVID-vaccine given recent COVID infection). Relevant Orders    CBC with Auto Differential    Comprehensive Metabolic Panel    Lipid Panel    TSH with Reflex    Vitamin D 25 Hydroxy       Current Outpatient Medications   Medication Sig Dispense Refill    Fremanezumab-vfrm (AJOVY) 225 MG/1.5ML SOAJ INJECT 1.5 ML SUBCUTANEOUSLY EVERY 30 DAYS 3 Adjustable Dose Pre-filled Pen Syringe 1    ondansetron (ZOFRAN ODT) 8 MG TBDP disintegrating tablet Place 1 tablet under the tongue every 8 hours as needed for Nausea or Vomiting 10 tablet 1    Rimegepant Sulfate (NURTEC) 75 MG TBDP Take 1 tablet at the onset of the severe headache. Not to exceed 1 tablet in 48 hours.  8 tablet 1    amitriptyline (ELAVIL) 10 MG tablet Take 1 tablet at night for 1 week and then 2 tablets at night 60 tablet 2    verapamil (CALAN SR) 120 MG extended release tablet TAKE 1 TABLET BY MOUTH EVERY EVENING 90 tablet 3    albuterol sulfate  (90 Base) MCG/ACT inhaler TAKE 2 PUFFS BY MOUTH EVERY 6 HOURS AS NEEDED FOR WHEEZE 6.7 each 1 olopatadine (PATANOL) 0.1 % ophthalmic solution 1 drop 2 times daily as needed       levocetirizine (XYZAL) 5 MG tablet Take 5 mg by mouth nightly      fluticasone (VERAMYST) 27.5 MCG/SPRAY nasal spray by Nasal route every morning      ibuprofen (ADVIL;MOTRIN) 200 MG tablet Take 200 mg by mouth every 6 hours as needed for Pain      clotrimazole-betamethasone (LOTRISONE) 1-0.05 % cream Apply topically 2 times daily. 45 g 1    Aspirin-Acetaminophen-Caffeine (EXCEDRIN PO) Take by mouth       No current facility-administered medications for this visit. Return in about 1 year (around 2/14/2024) for wellness, sooner if needed. eli with Elva Reynaga for PT eval. .

## 2023-02-14 ENCOUNTER — OFFICE VISIT (OUTPATIENT)
Dept: INTERNAL MEDICINE CLINIC | Age: 53
End: 2023-02-14
Payer: COMMERCIAL

## 2023-02-14 VITALS
HEIGHT: 65 IN | BODY MASS INDEX: 26.26 KG/M2 | OXYGEN SATURATION: 97 % | SYSTOLIC BLOOD PRESSURE: 128 MMHG | HEART RATE: 80 BPM | WEIGHT: 157.6 LBS | DIASTOLIC BLOOD PRESSURE: 72 MMHG

## 2023-02-14 DIAGNOSIS — R53.83 FATIGUE, UNSPECIFIED TYPE: ICD-10-CM

## 2023-02-14 DIAGNOSIS — E55.9 VITAMIN D INSUFFICIENCY: ICD-10-CM

## 2023-02-14 DIAGNOSIS — K63.5 POLYP OF COLON, UNSPECIFIED PART OF COLON, UNSPECIFIED TYPE: ICD-10-CM

## 2023-02-14 DIAGNOSIS — Z00.00 ENCOUNTER FOR WELL ADULT EXAM WITHOUT ABNORMAL FINDINGS: ICD-10-CM

## 2023-02-14 DIAGNOSIS — G43.109 MIGRAINE WITH AURA AND WITHOUT STATUS MIGRAINOSUS, NOT INTRACTABLE: ICD-10-CM

## 2023-02-14 DIAGNOSIS — Z00.00 WELL ADULT EXAM: Primary | ICD-10-CM

## 2023-02-14 PROCEDURE — 99396 PREV VISIT EST AGE 40-64: CPT | Performed by: INTERNAL MEDICINE

## 2023-02-14 SDOH — ECONOMIC STABILITY: FOOD INSECURITY: WITHIN THE PAST 12 MONTHS, YOU WORRIED THAT YOUR FOOD WOULD RUN OUT BEFORE YOU GOT MONEY TO BUY MORE.: NEVER TRUE

## 2023-02-14 SDOH — ECONOMIC STABILITY: HOUSING INSECURITY
IN THE LAST 12 MONTHS, WAS THERE A TIME WHEN YOU DID NOT HAVE A STEADY PLACE TO SLEEP OR SLEPT IN A SHELTER (INCLUDING NOW)?: NO

## 2023-02-14 SDOH — ECONOMIC STABILITY: INCOME INSECURITY: HOW HARD IS IT FOR YOU TO PAY FOR THE VERY BASICS LIKE FOOD, HOUSING, MEDICAL CARE, AND HEATING?: NOT HARD AT ALL

## 2023-02-14 SDOH — ECONOMIC STABILITY: FOOD INSECURITY: WITHIN THE PAST 12 MONTHS, THE FOOD YOU BOUGHT JUST DIDN'T LAST AND YOU DIDN'T HAVE MONEY TO GET MORE.: NEVER TRUE

## 2023-02-14 ASSESSMENT — ENCOUNTER SYMPTOMS
CHEST TIGHTNESS: 0
CONSTIPATION: 0
DIARRHEA: 0
SHORTNESS OF BREATH: 0

## 2023-02-14 ASSESSMENT — PATIENT HEALTH QUESTIONNAIRE - PHQ9
SUM OF ALL RESPONSES TO PHQ QUESTIONS 1-9: 0
SUM OF ALL RESPONSES TO PHQ9 QUESTIONS 1 & 2: 0
SUM OF ALL RESPONSES TO PHQ QUESTIONS 1-9: 0
2. FEELING DOWN, DEPRESSED OR HOPELESS: 0
1. LITTLE INTEREST OR PLEASURE IN DOING THINGS: 0
SUM OF ALL RESPONSES TO PHQ QUESTIONS 1-9: 0
SUM OF ALL RESPONSES TO PHQ QUESTIONS 1-9: 0

## 2023-02-14 NOTE — ASSESSMENT & PLAN NOTE
She continues to follow with Dr. Jim Beckwith for neurology. Was doing very well on verapamil  mg daily and monthly Aimovig and then Ajovy with Nurtec as needed. Insurance has now denied Ajovy. Does notice that her headaches seem to start in the right occipital area.   Will refer for physical therapy screen to see if she would benefit from PT.

## 2023-02-14 NOTE — ASSESSMENT & PLAN NOTE
Likely related to poor sleep. Was started on amitriptyline 10 mg nightly by Dr. Vida Koroma in December. Has noticed that she does sleep better when she takes amitriptyline. Is a somewhat restless sleeper. Consider work-up for sleep apnea if fatigue does not improve with amitriptyline use.

## 2023-02-14 NOTE — ASSESSMENT & PLAN NOTE
Overdue for mammogram-rescheduled due to recent COVID infection. She will check with  Dr. Chary Hassan office to see when she is due for follow-up colonoscopy. Previously followed with Dr. Marcello Snyder (who is left her position) for GYN care and will transition her care to 1 of Dr. Jeff Gomes partners. Check fasting blood work. Discussed diet and exercise habits. Recommend Shingrix vaccine in the near future. Recommend yearly flu shots and updated COVID vaccines (would not get current COVID-vaccine given recent COVID infection).

## 2023-02-14 NOTE — ASSESSMENT & PLAN NOTE
Most recent colonoscopy was normal in 2017 with  Dr. Keerthi Campos. Encourage patient to reach out to  Dr. Leesa Sotomayor office to see if she is due for a follow-up colonoscopy as she has a history of polyps.

## 2023-03-16 ENCOUNTER — OFFICE VISIT (OUTPATIENT)
Dept: NEUROLOGY | Age: 53
End: 2023-03-16
Payer: COMMERCIAL

## 2023-03-16 VITALS
DIASTOLIC BLOOD PRESSURE: 83 MMHG | HEIGHT: 65 IN | HEART RATE: 83 BPM | BODY MASS INDEX: 26.16 KG/M2 | WEIGHT: 157 LBS | SYSTOLIC BLOOD PRESSURE: 137 MMHG

## 2023-03-16 DIAGNOSIS — G44.209 MUSCLE CONTRACTION HEADACHE: ICD-10-CM

## 2023-03-16 DIAGNOSIS — M54.2 MUSCULOSKELETAL NECK PAIN: ICD-10-CM

## 2023-03-16 DIAGNOSIS — G43.109 MIGRAINE WITH AURA AND WITHOUT STATUS MIGRAINOSUS, NOT INTRACTABLE: Primary | ICD-10-CM

## 2023-03-16 PROBLEM — Z00.00 WELL ADULT EXAM: Status: RESOLVED | Noted: 2020-09-25 | Resolved: 2023-03-16

## 2023-03-16 PROCEDURE — 99214 OFFICE O/P EST MOD 30 MIN: CPT | Performed by: PSYCHIATRY & NEUROLOGY

## 2023-03-16 RX ORDER — AMITRIPTYLINE HYDROCHLORIDE 10 MG/1
TABLET, FILM COATED ORAL
Qty: 90 TABLET | Refills: 1 | Status: SHIPPED | OUTPATIENT
Start: 2023-03-16

## 2023-03-16 RX ORDER — ONDANSETRON 8 MG/1
8 TABLET, ORALLY DISINTEGRATING ORAL EVERY 8 HOURS PRN
Qty: 10 TABLET | Refills: 1 | Status: SHIPPED | OUTPATIENT
Start: 2023-03-16

## 2023-03-16 RX ORDER — FREMANEZUMAB-VFRM 225 MG/1.5ML
INJECTION SUBCUTANEOUS
Qty: 3 ADJUSTABLE DOSE PRE-FILLED PEN SYRINGE | Refills: 1 | Status: SHIPPED | OUTPATIENT
Start: 2023-03-16

## 2023-03-16 RX ORDER — RIMEGEPANT SULFATE 75 MG/75MG
TABLET, ORALLY DISINTEGRATING ORAL
Qty: 8 TABLET | Refills: 1 | Status: SHIPPED | OUTPATIENT
Start: 2023-03-16

## 2023-03-16 NOTE — PROGRESS NOTES
[facility]   Neurology followup    Subjective:   CC/HP  History was obtained from the patient. Interval history:   Patient states that her migraine headaches are doing reasonably well. She gets maybe 1 headache a month. She is taking Ajovy and it seems to work fairly well for those headaches. However she also gets more frequent dull headaches. Patient states that she has been taking amitriptyline for a few weeks and it is helped her neck pain to some extent. She has dry mouth but no other major side effects. She takes her Nurtec for the breakthrough migraines. Patient cannot take topiramate because of history of kidney stones. She was unable to tolerate propranolol. Detailed history:  Patient states that she had headaches when she was very young during school days but then they got better. However in the last few years they have come back and in the last few months they have been worse. Now she gets headaches almost once every week. They can last for several hours. Headaches are throbbing in nature. She has scintillating scotoma with severe headaches. There is a family history of headaches in her mother and grandmother  She had an episode of focal visual hemianopsia last week along with a scintillating scotoma when she had her severe headache. Bright light and loud noise make the headaches worse. No clear relieving factors.     REVIEW OF SYSTEMS    Constitutional:  []   Chills   []  Fatigue   []  Fevers   []  Malaise   []  Weight loss     [x] Denies all of the above    Respiratory:   []  Cough    []  Shortness of breath         [x] Denies all of the above     Cardiovascular:   []  Chest pain    []  Exertional chest pressure/discomfort           [] Palpitations    []  Syncope     [x] Denies all of the above        Past Medical History:   Diagnosis Date    Allergic rhinitis     Asthma     Irritable bowel syndrome     Kidney stone     Migraine      Family History   Problem Relation Age of Onset    Diabetes Mother     High Blood Pressure Mother     High Blood Pressure Father     High Blood Pressure Maternal Grandfather     Diabetes Maternal Grandfather     Heart Disease Maternal Grandfather     Colon Cancer Paternal Grandmother         61    Heart Disease Maternal Cousin         pacemaker    Migraines Daughter      Social History     Socioeconomic History    Marital status:      Spouse name: None    Number of children: None    Years of education: None    Highest education level: None   Tobacco Use    Smoking status: Never    Smokeless tobacco: Never   Vaping Use    Vaping Use: Never used   Substance and Sexual Activity    Alcohol use: Yes     Alcohol/week: 2.0 standard drinks     Types: 1 Glasses of wine, 1 Drinks containing 0.5 oz of alcohol per week     Comment: Do not drink on a weekly basis    Drug use: Never    Sexual activity: Yes     Partners: Male     Social Determinants of Health     Financial Resource Strain: Low Risk     Difficulty of Paying Living Expenses: Not hard at all   Food Insecurity: No Food Insecurity    Worried About Running Out of Food in the Last Year: Never true    Ran Out of Food in the Last Year: Never true   Transportation Needs: Unknown    Lack of Transportation (Non-Medical): No   Housing Stability: Unknown    Unstable Housing in the Last Year: No        Objective:  Exam:  /83   Pulse 83   Ht 5' 5\" (1.651 m)   Wt 157 lb (71.2 kg)   BMI 26.13 kg/m²   Patient is awake, alert and oriented x3. Speech is normal.  Pupils are equal round reacting to light. Extraocular movements intact. Face symmetrical. Tongue midline. Motor exam shows normal symmetrical strength. Deep tendon reflexes normal. Plantar reflexes downgoing. Sensory exam normal. Coordination normal. Gait normal. No carotid bruit. No neck stiffness.       Data :  LABS:  General Labs:    CBC:   Lab Results   Component Value Date/Time    WBC 6.8 02/14/2023 09:29 AM    RBC 4.60 02/14/2023 09:29 AM HGB 14.4 02/14/2023 09:29 AM    HCT 42.7 02/14/2023 09:29 AM    MCV 92.9 02/14/2023 09:29 AM    MCH 31.4 02/14/2023 09:29 AM    MCHC 33.8 02/14/2023 09:29 AM    RDW 12.4 02/14/2023 09:29 AM     02/14/2023 09:29 AM    MPV 7.6 02/14/2023 09:29 AM     BMP:    Lab Results   Component Value Date/Time     02/14/2023 09:29 AM    K 5.2 02/14/2023 09:29 AM     02/14/2023 09:29 AM    CO2 26 02/14/2023 09:29 AM    BUN 14 02/14/2023 09:29 AM    LABALBU 4.6 02/14/2023 09:29 AM    CREATININE 0.8 02/14/2023 09:29 AM    CALCIUM 9.7 02/14/2023 09:29 AM    GFRAA >60 09/25/2020 09:06 AM    LABGLOM >60 02/14/2023 09:29 AM    GLUCOSE 104 02/14/2023 09:29 AM       Impression :  Intractable migraines, now doing better on Ajovy  She takes an occasional Nurtec for the breakthrough headaches. Muscle contraction headaches, improved  Musculoskeletal neck pain, improved  She is also on verapamil from her primary care physician    Plan :  Continue Ajovy injections every 30 days  I also refilled her Nurtec  today to be taken on a as needed basis. I will refill Zofran to take when she has nausea with the migraines  Continue amitriptyline 10 mg at night  Side effects were discussed. I will see her back in 6 months for follow-up        Please note a portion of  this chart was generated using dragon dictation software. Although every effort was made to ensure the accuracy of this automated transcription, some errors in transcription may have occurred. Pursuant to the emergency declaration under the [facility], 1135 waiver authority and the [employer] Act, this Virtual  Visit was conducted, with patient's consent, to reduce the patient's risk of exposure to COVID-19 and provide continuity of care for an established patient.     Services were provided through a video synchronous discussion virtually to substitute for in-person clinic visit.

## 2023-03-17 ENCOUNTER — TELEPHONE (OUTPATIENT)
Dept: NEUROLOGY | Age: 53
End: 2023-03-17

## 2023-03-20 NOTE — TELEPHONE ENCOUNTER
Key: [facility] - PA Case ID: [id number] - Rx #: [id number]    Listed tried and failed meds. Attached office note for review.       PENDING

## 2023-04-02 DIAGNOSIS — G43.109 MIGRAINE WITH AURA AND WITHOUT STATUS MIGRAINOSUS, NOT INTRACTABLE: ICD-10-CM

## 2023-04-03 RX ORDER — AMITRIPTYLINE HYDROCHLORIDE 10 MG/1
TABLET, FILM COATED ORAL
Qty: 60 TABLET | Refills: 2 | OUTPATIENT
Start: 2023-04-03

## 2023-04-08 ENCOUNTER — HOSPITAL ENCOUNTER (OUTPATIENT)
Dept: WOMENS IMAGING | Age: 53
Discharge: HOME | End: 2023-04-08
Payer: COMMERCIAL

## 2023-04-08 VITALS — BODY MASS INDEX: 26.33 KG/M2 | HEIGHT: 65 IN | WEIGHT: 158 LBS

## 2023-04-08 DIAGNOSIS — Z12.31 ENCOUNTER FOR SCREENING MAMMOGRAM FOR BREAST CANCER: ICD-10-CM

## 2023-04-08 PROCEDURE — 77063 BREAST TOMOSYNTHESIS BI: CPT

## 2023-05-10 DIAGNOSIS — G43.109 MIGRAINE WITH AURA AND WITHOUT STATUS MIGRAINOSUS, NOT INTRACTABLE: ICD-10-CM

## 2023-05-12 RX ORDER — AMITRIPTYLINE HYDROCHLORIDE 10 MG/1
TABLET, FILM COATED ORAL
Qty: 60 TABLET | Refills: 2 | OUTPATIENT
Start: 2023-05-12

## 2023-05-12 NOTE — TELEPHONE ENCOUNTER
amitriptyline (ELAVIL) 10 MG tablet 90 tablet 1 3/16/2023     Sig: Take 1 tablet at bedtime    Sent to pharmacy as: Amitriptyline HCl 10 MG Oral Tablet (ELAVIL)    E-Prescribing Status: Receipt confirmed by pharmacy (3/16/2023  9:28 AM EDT)    [facility]/pharmacy #[id number] - [address] Mario Clark [phone] - F [phone]

## 2023-06-19 ENCOUNTER — HOSPITAL ENCOUNTER (OUTPATIENT)
Dept: GENERAL RADIOLOGY | Age: 53
Discharge: HOME | End: 2023-06-19
Payer: COMMERCIAL

## 2023-06-19 ENCOUNTER — HOSPITAL ENCOUNTER (OUTPATIENT)
Age: 53
Discharge: HOME | End: 2023-06-19
Payer: COMMERCIAL

## 2023-06-19 ENCOUNTER — OFFICE VISIT (OUTPATIENT)
Dept: INTERNAL MEDICINE CLINIC | Age: 53
End: 2023-06-19
Payer: COMMERCIAL

## 2023-06-19 VITALS
WEIGHT: 159.8 LBS | OXYGEN SATURATION: 98 % | HEIGHT: 65 IN | HEART RATE: 77 BPM | SYSTOLIC BLOOD PRESSURE: 128 MMHG | BODY MASS INDEX: 26.62 KG/M2 | DIASTOLIC BLOOD PRESSURE: 80 MMHG

## 2023-06-19 DIAGNOSIS — M79.675 GREAT TOE PAIN, LEFT: ICD-10-CM

## 2023-06-19 DIAGNOSIS — M79.675 GREAT TOE PAIN, LEFT: Primary | ICD-10-CM

## 2023-06-19 LAB — URATE SERPL-MCNC: 4.8 MG/DL (ref 2.6–6)

## 2023-06-19 PROCEDURE — 73660 X-RAY EXAM OF TOE(S): CPT

## 2023-06-19 PROCEDURE — 99213 OFFICE O/P EST LOW 20 MIN: CPT | Performed by: INTERNAL MEDICINE

## 2023-06-19 NOTE — PATIENT INSTRUCTIONS
Use diclofenac gel (Voltaren gel) as needed for localized areas of pain. For smaller areas (hands, wrists, elbows, feet, and ankles)- use about 1 finger tip's worth of cream. For larger areas, squeeze cream from your middle knuckle to your finger tip. You may apply diclofenac gel up to 4 times daily as needed.

## 2023-06-19 NOTE — PROGRESS NOTES
+ displaced fx of the proximal phalanx of the 4th finger. Pt will need referral to hand surgeon for re-evaluation and definitive care. Patient: Maikel Alvarez is a 47 y.o. female who presents today with the following Chief Complaint(s):  Chief Complaint   Patient presents with    Foot Pain     Left foot pain at 1st metatarsal, no known injury       HPI    Here today for acute visit. C/o left foot pain. Started left great toe, 1st MTP area. Has noticed decreased ROM, swelling. Pain started a few months ago, mild. Got worse last week while in [place]. Was doing a lot of walking in sandals which hit the painful area. Wore sneakers which did feel better but would occasionally hit the painful area. Did take Advil which did help. Did soak foot in cold water. Has been going to [employer] twice weekly. Works with a . Doing strength training. Uses a muscle stimulator while working out. Allergies   Allergen Reactions    Zithromax [Azithromycin] Rash      Past Medical History:   Diagnosis Date    Allergic rhinitis     Asthma     Irritable bowel syndrome     Kidney stone     Migraine       Past Surgical History:   Procedure Laterality Date    HEMORRHOID SURGERY  2017    LITHOTRIPSY      x 2    OTHER SURGICAL HISTORY      anal fissure repair    PELVIC LAPAROSCOPY      endometriosis      Social History     Socioeconomic History    Marital status:      Spouse name: Not on file    Number of children: Not on file    Years of education: Not on file    Highest education level: Not on file   Occupational History    Not on file   Tobacco Use    Smoking status: Never    Smokeless tobacco: Never   Vaping Use    Vaping Use: Never used   Substance and Sexual Activity    Alcohol use:  Yes     Alcohol/week: 2.0 standard drinks     Types: 1 Glasses of wine, 1 Drinks containing 0.5 oz of alcohol per week     Comment: Do not drink on a weekly basis    Drug use: Never    Sexual activity: Yes     Partners: Male   Other Topics Concern    Not on file   Social History Narrative    Not on file     Social Determinants of Health     Financial Resource Strain: Low

## 2023-06-20 NOTE — ASSESSMENT & PLAN NOTE
Differential diagnosis includes acute arthritis versus gout versus pseudogout. Check x-ray of foot. Check uric acid level. Recommend diclofenac gel as needed or Advil as needed.

## 2023-06-21 ENCOUNTER — TELEPHONE (OUTPATIENT)
Dept: ADMINISTRATIVE | Age: 53
End: 2023-06-21

## 2023-06-21 NOTE — TELEPHONE ENCOUNTER
Submitted PA for Diclofenac Sodium 1% gel    [facility] Key: [id number] - PA Case ID: [id number] - Rx #: [id number]   STATUS: PENDING. Follow up done daily; if no response in three days we will refax for status check. If another three days goes by with no response we will call the insurance for status.

## 2023-06-22 NOTE — TELEPHONE ENCOUNTER
Left message for patient medication is approved and can be filled at pharmacy and to call with any questions

## 2023-06-22 NOTE — TELEPHONE ENCOUNTER
Approved on June 21  Your PA request has been approved.  Additional information will be provided in the approval communication  Diclofenac Sodium 1% gel

## 2023-06-26 ENCOUNTER — OFFICE VISIT (OUTPATIENT)
Dept: INTERNAL MEDICINE CLINIC | Age: 53
End: 2023-06-26
Payer: COMMERCIAL

## 2023-06-26 VITALS
SYSTOLIC BLOOD PRESSURE: 122 MMHG | DIASTOLIC BLOOD PRESSURE: 82 MMHG | WEIGHT: 159 LBS | HEART RATE: 79 BPM | BODY MASS INDEX: 26.46 KG/M2 | OXYGEN SATURATION: 98 %

## 2023-06-26 DIAGNOSIS — R31.29 OTHER MICROSCOPIC HEMATURIA: ICD-10-CM

## 2023-06-26 DIAGNOSIS — Z87.442 HISTORY OF KIDNEY STONES: ICD-10-CM

## 2023-06-26 DIAGNOSIS — N23 URINARY TRACT PAIN: Primary | ICD-10-CM

## 2023-06-26 LAB
BILIRUBIN, POC: NORMAL
BLOOD URINE, POC: NORMAL
CLARITY, POC: CLEAR
COLOR, POC: YELLOW
GLUCOSE URINE, POC: NORMAL
KETONES, POC: NORMAL
LEUKOCYTE EST, POC: NORMAL
NITRITE, POC: NORMAL
PH, POC: 5.5
PROTEIN, POC: NORMAL
SPECIFIC GRAVITY, POC: 1.01
UROBILINOGEN, POC: 0.2

## 2023-06-26 PROCEDURE — 81002 URINALYSIS NONAUTO W/O SCOPE: CPT | Performed by: NURSE PRACTITIONER

## 2023-06-26 PROCEDURE — 99214 OFFICE O/P EST MOD 30 MIN: CPT | Performed by: NURSE PRACTITIONER

## 2023-06-26 RX ORDER — TAMSULOSIN HYDROCHLORIDE 0.4 MG/1
0.4 CAPSULE ORAL DAILY
Qty: 10 CAPSULE | Refills: 0 | Status: SHIPPED | OUTPATIENT
Start: 2023-06-26

## 2023-06-27 LAB — BACTERIA UR CULT: NORMAL

## 2023-07-07 ENCOUNTER — TELEPHONE (OUTPATIENT)
Dept: INTERNAL MEDICINE CLINIC | Age: 53
End: 2023-07-07

## 2023-07-07 NOTE — TELEPHONE ENCOUNTER
Patient called in to speak to practice manager. Patient called the on call phone service at 10:30am on [address] while the office was closed and did not get a call back from a provider on call. Patient called a second time and still didn't get a call back. Patient is upset because she was in a lot of pain due to kidney stones and didn't know what to do at the time. I apologized to the patient and let her know I would look into the concern and thanked her for bring it to our attention.

## 2023-07-11 ENCOUNTER — TRANSCRIBE ORDERS (OUTPATIENT)
Dept: ADMINISTRATIVE | Age: 53
End: 2023-07-11

## 2023-07-11 ENCOUNTER — HOSPITAL ENCOUNTER (OUTPATIENT)
Dept: CT IMAGING | Age: 53
Discharge: HOME | End: 2023-07-11
Payer: COMMERCIAL

## 2023-07-11 DIAGNOSIS — R10.9 ABDOMINAL PAIN, UNSPECIFIED ABDOMINAL LOCATION: ICD-10-CM

## 2023-07-11 DIAGNOSIS — R10.9 ABDOMINAL PAIN, UNSPECIFIED ABDOMINAL LOCATION: Primary | ICD-10-CM

## 2023-07-11 PROCEDURE — 74176 CT ABD & PELVIS W/O CONTRAST: CPT

## 2023-07-24 DIAGNOSIS — G43.109 MIGRAINE WITH AURA AND WITHOUT STATUS MIGRAINOSUS, NOT INTRACTABLE: ICD-10-CM

## 2023-07-25 RX ORDER — AMITRIPTYLINE HYDROCHLORIDE 10 MG/1
TABLET, FILM COATED ORAL
Qty: 60 TABLET | Refills: 2 | Status: SHIPPED | OUTPATIENT
Start: 2023-07-25

## 2023-07-25 NOTE — TELEPHONE ENCOUNTER
Medication:   Requested Prescriptions     Pending Prescriptions Disp Refills    amitriptyline (ELAVIL) 10 MG tablet [Pharmacy Med Name: AMITRIPTYLINE HCL 10 MG TAB] 60 tablet 2     Sig: TAKE 1 TABLET AT NIGHT FOR 1 WEEK AND THEN 2 TABLETS AT NIGHT        Last Filled:      Patient Phone Number: [phone] (home)     Last appt: 3/16/2023   Next appt: 9/11/2023    Last [place]:   RX Monitoring 2/12/2021   Periodic Controlled Substance Monitoring Possible medication side effects, risk of tolerance/dependence & alternative treatments discussed. ;No signs of potential drug abuse or diversion identified.

## 2023-08-01 ENCOUNTER — OFFICE VISIT (OUTPATIENT)
Dept: INTERNAL MEDICINE CLINIC | Age: 53
End: 2023-08-01
Payer: COMMERCIAL

## 2023-08-01 VITALS
DIASTOLIC BLOOD PRESSURE: 86 MMHG | OXYGEN SATURATION: 98 % | WEIGHT: 157.8 LBS | SYSTOLIC BLOOD PRESSURE: 128 MMHG | HEART RATE: 75 BPM | BODY MASS INDEX: 26.29 KG/M2 | HEIGHT: 65 IN
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
## 2023-08-01 DIAGNOSIS — L03.317 CELLULITIS OF BUTTOCK, LEFT: ICD-10-CM

## 2023-08-01 PROCEDURE — 99213 OFFICE O/P EST LOW 20 MIN: CPT

## 2023-08-01 RX ORDER — SULFAMETHOXAZOLE AND TRIMETHOPRIM 800; 160 MG/1; MG/1
1 TABLET ORAL 2 TIMES DAILY
Qty: 14 TABLET | Refills: 0 | Status: SHIPPED | OUTPATIENT
Start: 2023-08-01 | End: 2023-08-08

## 2023-08-01 ASSESSMENT — ENCOUNTER SYMPTOMS
ABDOMINAL PAIN: 0
NAUSEA: 0
VOMITING: 0

## 2023-08-01 NOTE — ASSESSMENT & PLAN NOTE
~1 inch surrounding erythema to x2 puncture sites. No area of induration, no abscess. Antibiotic coverage given erythema/history of progressive cellulitis with bites. No drainage on exam today, unable to collect culture. Can apply warm compress 3 times daily. Given cellulitis/bite patient education packets. Reviewed red flags for follow-up.

## 2023-09-11 ENCOUNTER — OFFICE VISIT (OUTPATIENT)
Dept: NEUROLOGY | Age: 53
End: 2023-09-11
Payer: COMMERCIAL

## 2023-09-11 VITALS
WEIGHT: 161 LBS | HEIGHT: 65 IN | SYSTOLIC BLOOD PRESSURE: 131 MMHG | HEART RATE: 74 BPM | DIASTOLIC BLOOD PRESSURE: 87 MMHG | BODY MASS INDEX: 26.82 KG/M2

## 2023-09-11 DIAGNOSIS — G43.109 MIGRAINE WITH AURA AND WITHOUT STATUS MIGRAINOSUS, NOT INTRACTABLE: ICD-10-CM

## 2023-09-11 PROCEDURE — 99213 OFFICE O/P EST LOW 20 MIN: CPT | Performed by: PSYCHIATRY & NEUROLOGY

## 2023-09-11 RX ORDER — ONDANSETRON 8 MG/1
8 TABLET, ORALLY DISINTEGRATING ORAL EVERY 8 HOURS PRN
Qty: 10 TABLET | Refills: 1 | Status: CANCELLED | OUTPATIENT
Start: 2023-09-11

## 2023-09-11 RX ORDER — FREMANEZUMAB-VFRM 225 MG/1.5ML
INJECTION SUBCUTANEOUS
Qty: 3 ADJUSTABLE DOSE PRE-FILLED PEN SYRINGE | Refills: 5 | Status: SHIPPED | OUTPATIENT
Start: 2023-09-11

## 2023-09-11 RX ORDER — RIMEGEPANT SULFATE 75 MG/75MG
TABLET, ORALLY DISINTEGRATING ORAL
Qty: 8 TABLET | Refills: 1 | Status: SHIPPED | OUTPATIENT
Start: 2023-09-11

## 2023-09-11 RX ORDER — AMITRIPTYLINE HYDROCHLORIDE 10 MG/1
TABLET, FILM COATED ORAL
Qty: 90 TABLET | Refills: 1 | Status: SHIPPED | OUTPATIENT
Start: 2023-09-11

## 2023-09-11 NOTE — PROGRESS NOTES
No carotid bruit. No neck stiffness. Data :  LABS:  General Labs:    CBC:   Lab Results   Component Value Date/Time    WBC 6.8 02/14/2023 09:29 AM    RBC 4.60 02/14/2023 09:29 AM    HGB 14.4 02/14/2023 09:29 AM    HCT 42.7 02/14/2023 09:29 AM    MCV 92.9 02/14/2023 09:29 AM    MCH 31.4 02/14/2023 09:29 AM    MCHC 33.8 02/14/2023 09:29 AM    RDW 12.4 02/14/2023 09:29 AM     02/14/2023 09:29 AM    MPV 7.6 02/14/2023 09:29 AM     BMP:    Lab Results   Component Value Date/Time     02/14/2023 09:29 AM    K 5.2 02/14/2023 09:29 AM     02/14/2023 09:29 AM    CO2 26 02/14/2023 09:29 AM    BUN 14 02/14/2023 09:29 AM    LABALBU 4.6 02/14/2023 09:29 AM    CREATININE 0.8 02/14/2023 09:29 AM    CALCIUM 9.7 02/14/2023 09:29 AM    GFRAA >60 09/25/2020 09:06 AM    LABGLOM >60 02/14/2023 09:29 AM    GLUCOSE 104 02/14/2023 09:29 AM       Impression :  Intractable migraines, now doing better on Ajovy  She takes an occasional Nurtec for the breakthrough headaches. Muscle contraction headaches, improved  Musculoskeletal neck pain, improved  She is also on verapamil from her primary care physician    Plan :  Continue Ajovy injections every 30 days  I also refilled her Nurtec  today to be taken on a as needed basis. I will refill Zofran to take when she has nausea with the migraines  Continue amitriptyline 10 mg at night  Side effects were discussed. Return in 6 months for follow-up        Please note a portion of  this chart was generated using dragon dictation software. Although every effort was made to ensure the accuracy of this automated transcription, some errors in transcription may have occurred.      Pursuant to the emergency declaration under the [address] and the [employer] Act, this Virtual  Visit was conducted, with patient's consent, to reduce the patient's risk of exposure to

## 2023-10-24 ENCOUNTER — OFFICE VISIT (OUTPATIENT)
Dept: INTERNAL MEDICINE CLINIC | Age: 53
End: 2023-10-24
Payer: COMMERCIAL

## 2023-10-24 VITALS
OXYGEN SATURATION: 98 % | BODY MASS INDEX: 26.63 KG/M2 | DIASTOLIC BLOOD PRESSURE: 86 MMHG | SYSTOLIC BLOOD PRESSURE: 126 MMHG | HEART RATE: 92 BPM | WEIGHT: 160 LBS

## 2023-10-24 DIAGNOSIS — M25.561 ACUTE PAIN OF BOTH KNEES: Primary | ICD-10-CM

## 2023-10-24 DIAGNOSIS — M79.642 LEFT HAND PAIN: ICD-10-CM

## 2023-10-24 DIAGNOSIS — M25.562 ACUTE PAIN OF BOTH KNEES: Primary | ICD-10-CM

## 2023-10-24 DIAGNOSIS — J06.9 VIRAL UPPER RESPIRATORY TRACT INFECTION: ICD-10-CM

## 2023-10-24 DIAGNOSIS — J02.9 SORE THROAT: ICD-10-CM

## 2023-10-24 DIAGNOSIS — M79.675 GREAT TOE PAIN, LEFT: ICD-10-CM

## 2023-10-24 PROCEDURE — 87880 STREP A ASSAY W/OPTIC: CPT | Performed by: INTERNAL MEDICINE

## 2023-10-24 PROCEDURE — 99213 OFFICE O/P EST LOW 20 MIN: CPT | Performed by: INTERNAL MEDICINE

## 2023-10-24 NOTE — PROGRESS NOTES
thyroid mass or thyromegaly. Vascular: No carotid bruit. Cardiovascular:      Rate and Rhythm: Normal rate and regular rhythm. Heart sounds: Normal heart sounds. No murmur heard. Pulmonary:      Effort: Pulmonary effort is normal.      Breath sounds: Normal breath sounds. Musculoskeletal:      Right hand: Normal.      Left hand: Normal.      Right knee: Decreased range of motion. Tenderness present over the medial joint line. Left knee: Decreased range of motion. Tenderness present over the medial joint line. Right lower leg: No edema. Left lower leg: No edema. Lymphadenopathy:      Cervical: No cervical adenopathy. Neurological:      General: No focal deficit present. Mental Status: She is alert and oriented to person, place, and time. Psychiatric:         Mood and Affect: Mood normal.         Behavior: Behavior normal. Behavior is cooperative. ASSESSMENT/PLAN:    Problem List Items Addressed This Visit       Acute pain of both knees - Primary      Suspect is mechanical in nature as pain seems to be exacerbated by certain exercises. Does have family history of RA so we will check CRP, sed rate, and rheumatoid factor. Recommend the patient see [facility] for physical therapy screen and possible referral to official physical therapy. Recommend ice following exercise. Relevant Orders    C-Reactive Protein    Sedimentation Rate    RHEUMATOID FACTOR    Great toe pain, left      Ongoing for several months. X-ray of left foot was negative. Check sed rate, CRP, RA factor. Referral placed to dietary. Relevant Orders    C-Reactive Protein    Sedimentation Rate    RHEUMATOID FACTOR    EVELYN - Governor Allen DPM, Podiatry, [facility]    Left hand pain      Gradual onset. Doubt RA but she does have family history of RA so we will check CRP, sed rate, and rheumatoid factor. Recommend use of Voltaren gel.          Relevant Orders    C-Reactive Protein

## 2023-11-05 PROBLEM — J02.9 SORE THROAT: Status: ACTIVE | Noted: 2023-11-05

## 2023-11-05 PROBLEM — M25.561 ACUTE PAIN OF BOTH KNEES: Status: ACTIVE | Noted: 2023-11-05

## 2023-11-05 PROBLEM — M79.642 LEFT HAND PAIN: Status: ACTIVE | Noted: 2023-11-05

## 2023-11-05 PROBLEM — J06.9 VIRAL UPPER RESPIRATORY TRACT INFECTION: Status: ACTIVE | Noted: 2023-11-05

## 2023-11-05 PROBLEM — M25.562 ACUTE PAIN OF BOTH KNEES: Status: ACTIVE | Noted: 2023-11-05

## 2023-11-17 DIAGNOSIS — M25.562 ACUTE PAIN OF BOTH KNEES: ICD-10-CM

## 2023-11-17 DIAGNOSIS — M79.675 GREAT TOE PAIN, LEFT: ICD-10-CM

## 2023-11-17 DIAGNOSIS — M79.642 LEFT HAND PAIN: ICD-10-CM

## 2023-11-17 DIAGNOSIS — M25.561 ACUTE PAIN OF BOTH KNEES: ICD-10-CM

## 2023-11-17 LAB
CRP SERPL-MCNC: <3 MG/L (ref 0–5.1)
ERYTHROCYTE [SEDIMENTATION RATE] IN BLOOD BY WESTERGREN METHOD: 3 MM/HR (ref 0–30)
RHEUMATOID FACT SER IA-ACNC: <10 IU/ML

## 2023-11-24 DIAGNOSIS — G43.109 MIGRAINE WITH AURA AND WITHOUT STATUS MIGRAINOSUS, NOT INTRACTABLE: ICD-10-CM

## 2023-12-05 PROBLEM — J02.9 SORE THROAT: Status: RESOLVED | Noted: 2023-11-05 | Resolved: 2023-12-05

## 2024-02-05 ENCOUNTER — TELEPHONE (OUTPATIENT)
Dept: INTERNAL MEDICINE CLINIC | Age: 54
End: 2024-02-05

## 2024-02-05 DIAGNOSIS — R79.89 LOW VITAMIN B12 LEVEL: ICD-10-CM

## 2024-02-05 DIAGNOSIS — Z00.00 WELL ADULT EXAM: ICD-10-CM

## 2024-02-05 DIAGNOSIS — E55.9 VITAMIN D INSUFFICIENCY: Primary | ICD-10-CM

## 2024-02-05 NOTE — TELEPHONE ENCOUNTER
Labs have been ordered. May get done at her convenience prior to appointment. Ideally, she will get them done at least 1-2 days prior. Thanks!

## 2024-02-12 ENCOUNTER — TELEPHONE (OUTPATIENT)
Dept: ADMINISTRATIVE | Age: 54
End: 2024-02-12

## 2024-02-12 ENCOUNTER — TELEPHONE (OUTPATIENT)
Dept: INTERNAL MEDICINE CLINIC | Age: 54
End: 2024-02-12

## 2024-02-12 ENCOUNTER — TELEPHONE (OUTPATIENT)
Dept: NEUROLOGY | Age: 54
End: 2024-02-12

## 2024-02-12 DIAGNOSIS — G43.109 MIGRAINE WITH AURA AND WITHOUT STATUS MIGRAINOSUS, NOT INTRACTABLE: Primary | ICD-10-CM

## 2024-02-12 NOTE — TELEPHONE ENCOUNTER
Submitted PA teja Herrera Via [facility] Key: [id number] STATUS: \"Please advise the dispensing pharmacy to contact the Pharmacy Help Line at [phone] for assistance.\"    If this requires a response please respond to the pool ( P [facility] P[facility] MEDICATION PRE-AUTH).      Thank you please advise patient.

## 2024-02-12 NOTE — TELEPHONE ENCOUNTER
----- Message from Mable Ayala sent at 2/12/2024 11:09 AM EST -----  Subject: Appointment Request    Reason for Call: Established Patient Appointment needed: Routine Physical   Exam    QUESTIONS    Reason for appointment request? No appointments available during search     Additional Information for Provider? Pt has physical on 2/14@4:20 but   needs a little later in the day, if possible. Available appts are in May.   Pt doesn't want to wait that long. Pt asked to leave appt until she speaks   to someone. Please call.   ---------------------------------------------------------------------------  --------------  CALL BACK INFO  [phone]; OK to leave message on voicemail  ---------------------------------------------------------------------------  --------------  SCRIPT ANSWERS

## 2024-02-12 NOTE — TELEPHONE ENCOUNTER
Submitted PA for [facility] Via [facility] Key: [id number] STATUS: \"Your PA has been resolved, no additional PA is required.\"

## 2024-02-12 NOTE — TELEPHONE ENCOUNTER
SAMPLES ONLY    Appt 3/19/24    Nurtec LF 9/11/23   Javier  9/11/23    Spoke to Insurance and Emelia @ [facility]. Neither Ajovy or Nurtec requires a prior authorization. Pt is currently out of medication. Pt has been advised to download a co-pay card for both and alert the pharmacist to run co-pay card as secondary insurance.     Pt will  a sample of both this week.

## 2024-02-14 ENCOUNTER — OFFICE VISIT (OUTPATIENT)
Dept: INTERNAL MEDICINE CLINIC | Age: 54
End: 2024-02-14
Payer: COMMERCIAL

## 2024-02-14 VITALS
WEIGHT: 161.4 LBS | DIASTOLIC BLOOD PRESSURE: 88 MMHG | OXYGEN SATURATION: 96 % | BODY MASS INDEX: 26.86 KG/M2 | HEART RATE: 90 BPM | SYSTOLIC BLOOD PRESSURE: 128 MMHG

## 2024-02-14 DIAGNOSIS — Z00.00 WELL ADULT EXAM: Primary | ICD-10-CM

## 2024-02-14 DIAGNOSIS — M25.561 RIGHT MEDIAL KNEE PAIN: ICD-10-CM

## 2024-02-14 DIAGNOSIS — Z12.11 COLON CANCER SCREENING: ICD-10-CM

## 2024-02-14 DIAGNOSIS — R73.9 HYPERGLYCEMIA: ICD-10-CM

## 2024-02-14 DIAGNOSIS — G43.109 MIGRAINE WITH AURA AND WITHOUT STATUS MIGRAINOSUS, NOT INTRACTABLE: ICD-10-CM

## 2024-02-14 PROCEDURE — 99396 PREV VISIT EST AGE 40-64: CPT | Performed by: INTERNAL MEDICINE

## 2024-02-14 NOTE — PROGRESS NOTES
Well Adult Note  Name: Stefania Cross Today’s Date: 2024   MRN: [id number] Sex: Female   Age: 54 y.o. Ethnicity: Non- / Non    : 1970 Race: White (non-)      Stefania Cross is here for well adult exam.  History:    Here today for yearly wellness visit.     Is maintaining her weight. Has not made changes to her diet but has been going to [facility] for training.     At the referral of her , she saw a physical therapist for her knee and did some dry needling. Not sure it if helped. Did stop bouncing with her squats and helped but has since noticed pain medially in her knee with kneeling.     Mammogram 2023.     Gyn- due. Has IUD. Not sure if she is needing to have IUD out.     Having hot flashes, interfering with her sleep. Makes her tired.     Diet- varied. Could do better. Raw vegetables are not well tolerated by her digestive system.     Water- tries to drink more.     Caffeine- coffee 1/day. Rare Coke Zero.     Alcohol- 2-3 drinks every few weeks. Tends to trigger migraines.     Has appointment with Dr Roa in March for migraines.     Review of Systems   Constitutional:  Negative for appetite change, fatigue and fever.   Respiratory:  Negative for chest tightness and shortness of breath.    Cardiovascular:  Negative for chest pain.   Gastrointestinal:  Negative for constipation and diarrhea.   Skin:  Negative for rash.       Allergies   Allergen Reactions    Zithromax [Azithromycin] Rash         Prior to Visit Medications    Medication Sig Taking? Authorizing Provider   verapamil (CALAN SR) 120 MG extended release tablet TAKE 1 TABLET BY MOUTH EVERY DAY IN THE EVENING Yes Ermelinda Sanchez DO   amitriptyline (ELAVIL) 10 MG tablet Take 1 tablet at night Yes Henri Covarrubias MD   Fremanezumab-vfrm (AJOVY) 225 MG/1.5ML SOAJ INJECT 1.5 ML SUBCUTANEOUSLY EVERY 30 DAYS Yes Henri Covarrubias MD   Rimegepant Sulfate (NURTEC) 75 MG TBDP Take 1 tablet at the

## 2024-02-14 NOTE — PATIENT INSTRUCTIONS
Add Vitamin E 1000 units daily to help with hot flashes. Try this for 3 months. If it does not help, you may stop.     Miralax Prep:  27 scoops in 64 oz of Gatorade (no red, orange, or purple).   1 32 oz bottle of Gatorade holds at least 12 scoops of Miralax.     Take Dulcolax Laxative 4 pills around noon. Start drinking Miralax in Gatorade about 2 hours later. Drink over the next 12 hours.     Clear liquid diet day that you do the prep.     Advance Care Planning     Advance Care Planning opens a door to talk about and write down your wishes before a sudden accident or illness.  Make your goals, values, and preferences known.     This puts you in the ’s seat and helps others know what matters most to you so they won’t have to guess.      Where can you learn more?    Go to [email or web address]   to learn how to:    Name someone you trust to make healthcare decisions for you, only if you can’t. (Healthcare Power of )    Document your wishes for care if you were seriously ill and not expected to recover or are approaching end of life. (Advance Directive or Living Will)    The same page can be found using the QR code below.

## 2024-02-16 DIAGNOSIS — R79.89 LOW VITAMIN B12 LEVEL: ICD-10-CM

## 2024-02-16 DIAGNOSIS — E55.9 VITAMIN D INSUFFICIENCY: ICD-10-CM

## 2024-02-16 DIAGNOSIS — Z00.00 WELL ADULT EXAM: ICD-10-CM

## 2024-02-16 LAB
25(OH)D3 SERPL-MCNC: 41.7 NG/ML
ALBUMIN SERPL-MCNC: 4.8 G/DL (ref 3.4–5)
ALBUMIN/GLOB SERPL: 2.1 {RATIO} (ref 1.1–2.2)
ALP SERPL-CCNC: 90 U/L (ref 40–129)
ALT SERPL-CCNC: 28 U/L (ref 10–40)
ANION GAP SERPL CALCULATED.3IONS-SCNC: 10 MMOL/L (ref 3–16)
AST SERPL-CCNC: 24 U/L (ref 15–37)
BASOPHILS # BLD: 0 K/UL (ref 0–0.2)
BASOPHILS NFR BLD: 0.5 %
BILIRUB SERPL-MCNC: 0.4 MG/DL (ref 0–1)
BUN SERPL-MCNC: 15 MG/DL (ref 7–20)
CALCIUM SERPL-MCNC: 9.2 MG/DL (ref 8.3–10.6)
CHLORIDE SERPL-SCNC: 104 MMOL/L (ref 99–110)
CHOLEST SERPL-MCNC: 214 MG/DL (ref 0–199)
CO2 SERPL-SCNC: 30 MMOL/L (ref 21–32)
CREAT SERPL-MCNC: 0.9 MG/DL (ref 0.6–1.1)
DEPRECATED RDW RBC AUTO: 12.3 % (ref 12.4–15.4)
EOSINOPHIL # BLD: 0.2 K/UL (ref 0–0.6)
EOSINOPHIL NFR BLD: 3.4 %
FOLATE SERPL-MCNC: >20 NG/ML (ref 4.78–24.2)
GFR SERPLBLD CREATININE-BSD FMLA CKD-EPI: >60 ML/MIN/{1.73_M2}
GLUCOSE SERPL-MCNC: 108 MG/DL (ref 70–99)
HCT VFR BLD AUTO: 39 % (ref 36–48)
HDLC SERPL-MCNC: 72 MG/DL (ref 40–60)
HGB BLD-MCNC: 13.4 G/DL (ref 12–16)
LDLC SERPL CALC-MCNC: 128 MG/DL
LYMPHOCYTES # BLD: 1.3 K/UL (ref 1–5.1)
LYMPHOCYTES NFR BLD: 24.8 %
MAGNESIUM SERPL-MCNC: 2.2 MG/DL (ref 1.8–2.4)
MCH RBC QN AUTO: 31.6 PG (ref 26–34)
MCHC RBC AUTO-ENTMCNC: 34.2 G/DL (ref 31–36)
MCV RBC AUTO: 92.2 FL (ref 80–100)
MONOCYTES # BLD: 0.4 K/UL (ref 0–1.3)
MONOCYTES NFR BLD: 8.2 %
NEUTROPHILS # BLD: 3.4 K/UL (ref 1.7–7.7)
NEUTROPHILS NFR BLD: 63.1 %
PLATELET # BLD AUTO: 271 K/UL (ref 135–450)
PMV BLD AUTO: 7.5 FL (ref 5–10.5)
POTASSIUM SERPL-SCNC: 4 MMOL/L (ref 3.5–5.1)
PROT SERPL-MCNC: 7.1 G/DL (ref 6.4–8.2)
RBC # BLD AUTO: 4.23 M/UL (ref 4–5.2)
SODIUM SERPL-SCNC: 144 MMOL/L (ref 136–145)
TRIGL SERPL-MCNC: 71 MG/DL (ref 0–150)
VIT B12 SERPL-MCNC: 1922 PG/ML (ref 211–911)
VLDLC SERPL CALC-MCNC: 14 MG/DL
WBC # BLD AUTO: 5.4 K/UL (ref 4–11)

## 2024-02-17 PROBLEM — M79.675 GREAT TOE PAIN, LEFT: Status: RESOLVED | Noted: 2023-06-19 | Resolved: 2024-02-17

## 2024-02-17 PROBLEM — J06.9 VIRAL UPPER RESPIRATORY TRACT INFECTION: Status: RESOLVED | Noted: 2023-11-05 | Resolved: 2024-02-17

## 2024-02-17 PROBLEM — L03.317 CELLULITIS OF BUTTOCK, LEFT: Status: RESOLVED | Noted: 2023-08-01 | Resolved: 2024-02-17
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
## 2024-02-17 PROBLEM — M79.642 LEFT HAND PAIN: Status: RESOLVED | Noted: 2023-11-05 | Resolved: 2024-02-17

## 2024-02-17 PROBLEM — R53.83 FATIGUE: Status: RESOLVED | Noted: 2023-02-14 | Resolved: 2024-02-17

## 2024-03-01 DIAGNOSIS — R73.9 HYPERGLYCEMIA: ICD-10-CM

## 2024-03-02 LAB
EST. AVERAGE GLUCOSE BLD GHB EST-MCNC: 114 MG/DL
HBA1C MFR BLD: 5.6 %

## 2024-03-04 ENCOUNTER — OFFICE VISIT (OUTPATIENT)
Dept: ORTHOPEDIC SURGERY | Age: 54
End: 2024-03-04

## 2024-03-04 VITALS — BODY MASS INDEX: 26.66 KG/M2 | HEIGHT: 65 IN | WEIGHT: 160 LBS

## 2024-03-04 DIAGNOSIS — M79.645 CHRONIC PAIN OF LEFT THUMB: ICD-10-CM

## 2024-03-04 DIAGNOSIS — M25.561 RIGHT KNEE PAIN, UNSPECIFIED CHRONICITY: Primary | ICD-10-CM

## 2024-03-04 DIAGNOSIS — G89.29 CHRONIC PAIN OF LEFT THUMB: ICD-10-CM

## 2024-03-04 RX ORDER — LIDOCAINE HYDROCHLORIDE 10 MG/ML
4 INJECTION, SOLUTION INFILTRATION; PERINEURAL ONCE
Status: COMPLETED | OUTPATIENT
Start: 2024-03-04 | End: 2024-03-04

## 2024-03-04 RX ORDER — MELOXICAM 15 MG/1
15 TABLET ORAL DAILY
Qty: 30 TABLET | Refills: 0 | Status: SHIPPED | OUTPATIENT
Start: 2024-03-04 | End: 2024-04-03

## 2024-03-04 RX ORDER — TRIAMCINOLONE ACETONIDE 40 MG/ML
40 INJECTION, SUSPENSION INTRA-ARTICULAR; INTRAMUSCULAR ONCE
Status: COMPLETED | OUTPATIENT
Start: 2024-03-04 | End: 2024-03-04

## 2024-03-04 RX ADMIN — TRIAMCINOLONE ACETONIDE 40 MG: 40 INJECTION, SUSPENSION INTRA-ARTICULAR; INTRAMUSCULAR at 16:09

## 2024-03-04 RX ADMIN — LIDOCAINE HYDROCHLORIDE 4 ML: 10 INJECTION, SOLUTION INFILTRATION; PERINEURAL at 16:09

## 2024-03-06 NOTE — PROGRESS NOTES
Patient: Stefania Cross  : 1970    MRN: [id number]    Date of Visit: 3/4/24    Attending Physician: Venu Antony MD    History of Present Illness  Ms.. Cross is a very pleasant 54 y.o. patient with a variety of musculoskeletal complaints including left great toe pain, bilateral knee pain right greater than left and left thumb base pain.    Regarding the toe this began after prolonged walking at a conference where she noted pain in the first metatarsophalangeal joint worse with increased activities.  This is slowly improved however she continues to get discomfort whenever she has long periods of walking or running.    Regarding the knees likewise she has noticed worsening medial joint line pain on the right greater than the left with increased activity she denies swelling catching locking or popping of the knee.    Regarding thumbs she has thumb base pain worse with pinching or grasping activities she has not previously tried any anti-inflammatories injections or bracing for the thumb.      PMH/PSH:  Past Medical History:   Diagnosis Date    Allergic rhinitis     Asthma     Irritable bowel syndrome     Kidney stone     Migraine      Patient Active Problem List   Diagnosis    Migraine with aura and without status migrainosus, not intractable    Environmental and seasonal allergies    Mild intermittent asthma without complication    History of kidney stones    Polyp of colon    Acute confusional migraine, refractory    Pulmonary nodule    Well adult exam    Raynaud's disease without gangrene    Vitamin D insufficiency    Right medial knee pain    Colon cancer screening     Past Surgical History:   Procedure Laterality Date    HEMORRHOID SURGERY  2017    LITHOTRIPSY      x 2    OTHER SURGICAL HISTORY      anal fissure repair    PELVIC LAPAROSCOPY      endometriosis       MEDS:  Scheduled Meds:  Continuous Infusions:  PRN Meds:  Current Meds:No current outpatient medications on

## 2024-03-15 ENCOUNTER — HOSPITAL ENCOUNTER (OUTPATIENT)
Dept: PHYSICAL THERAPY | Age: 54
Setting detail: THERAPIES SERIES
Discharge: HOME | End: 2024-03-15
Attending: ORTHOPAEDIC SURGERY
Payer: COMMERCIAL

## 2024-03-15 DIAGNOSIS — M25.674 DECREASED ROM OF RIGHT TOE: ICD-10-CM

## 2024-03-15 DIAGNOSIS — R29.898 WEAKNESS OF RIGHT LOWER EXTREMITY: Primary | ICD-10-CM

## 2024-03-15 DIAGNOSIS — M25.561 ACUTE PAIN OF RIGHT KNEE: ICD-10-CM

## 2024-03-15 PROBLEM — Z12.11 COLON CANCER SCREENING: Status: RESOLVED | Noted: 2024-02-14 | Resolved: 2024-03-15

## 2024-03-15 PROBLEM — Z00.00 WELL ADULT EXAM: Status: RESOLVED | Noted: 2020-09-25 | Resolved: 2024-03-15

## 2024-03-15 PROCEDURE — 97530 THERAPEUTIC ACTIVITIES: CPT

## 2024-03-15 PROCEDURE — 97161 PT EVAL LOW COMPLEX 20 MIN: CPT

## 2024-03-15 NOTE — PLAN OF CARE
Treatment Minutes 32        Charge Justification:  (69925) THERAPEUTIC ACTIVITY - use of dynamic activities to improve functional performance. (Ex include squatting, ascending/descending stairs, walking, bending, lifting, catching, throwing, pushing, pulling, jumping.)  Direct, one on one contact, billed in 15-minute increments.      GOALS     Patient stated goal: walk pain free; return to [facility] work outs  Status: [] Progressing: [] Met: [] Not Met: [] Adjusted    Therapist goals for Patient:   Short Term Goals: To be achieved in: 2 weeks  Independent in HEP and progression per patient tolerance, in order to progress toward full function and prevent re-injury.    Status: [] Progressing: [] Met: [] Not Met: [] Adjusted  Patient will have a decrease in pain to 0/10 with household ambulation.   Status: [] Progressing: [] Met: [] Not Met: [] Adjusted    Long Term Goals: To be achieved in: 4-6 weeks  Disability index score of 5% or less for the LEFS to assist with return top prior level of function.   Status: [] Progressing: [] Met: [] Not Met: [] Adjusted  Pt will demo L great toe ext to 60 and DF 10 or more to allow for appropriate push off and stance phase with gait.  Status: [] Progressing: [] Met: [] Not Met: [] Adjusted  Pt will demo symmetrical and pain free 1 min SL squat test for safe return to [facility] exercise program.   Status: [] Progressing: [] Met: [] Not Met: [] Adjusted  Patient will return to walking up to 45 min without increased symptoms or restriction to work towards return to prior level of function with grocery shopping.  Status: [] Progressing: [] Met: [] Not Met: [] Adjusted  Pt will return to jumping jacks x30 sec without pain to resume [facility] exercise program.           Status: [] Progressing: [] Met: [] Not Met: [] Adjusted    TREATMENT PLAN     Frequency/Duration: 2x/week for 4-6 weeks for the following treatment interventions:    Interventions:  [x] Therapeutic exercise including: strength

## 2024-03-19 ENCOUNTER — OFFICE VISIT (OUTPATIENT)
Dept: NEUROLOGY | Age: 54
End: 2024-03-19
Payer: COMMERCIAL

## 2024-03-19 VITALS
BODY MASS INDEX: 26.63 KG/M2 | SYSTOLIC BLOOD PRESSURE: 138 MMHG | HEART RATE: 97 BPM | DIASTOLIC BLOOD PRESSURE: 92 MMHG | WEIGHT: 160 LBS

## 2024-03-19 DIAGNOSIS — G43.109 MIGRAINE WITH AURA AND WITHOUT STATUS MIGRAINOSUS, NOT INTRACTABLE: Primary | ICD-10-CM

## 2024-03-19 DIAGNOSIS — M54.2 MUSCULOSKELETAL NECK PAIN: ICD-10-CM

## 2024-03-19 PROCEDURE — 99214 OFFICE O/P EST MOD 30 MIN: CPT

## 2024-03-19 RX ORDER — RIMEGEPANT SULFATE 75 MG/75MG
TABLET, ORALLY DISINTEGRATING ORAL
Qty: 8 TABLET | Refills: 1 | Status: SHIPPED | OUTPATIENT
Start: 2024-03-19

## 2024-03-19 RX ORDER — FREMANEZUMAB-VFRM 225 MG/1.5ML
INJECTION SUBCUTANEOUS
Qty: 3 ADJUSTABLE DOSE PRE-FILLED PEN SYRINGE | Refills: 5 | Status: SHIPPED | OUTPATIENT
Start: 2024-03-19

## 2024-03-19 NOTE — PATIENT INSTRUCTIONS
YOU MUST CONFIRM YOUR APPOINTMENT 1 DAY PRIOR OR IT WILL BE CANCELLED!!   Our office will call you 3 times the day prior to your appointment in an attempt to confirm.  Please return our call ASAP or confirm your appt through [employer] no later than 3 pm the day before your appointment.  If we do not hear back from you by 3 pm to confirm, your appointment will be cancelled & someone will be added into that slot from our wait list.

## 2024-03-19 NOTE — PROGRESS NOTES
General appearance:  Normal development and appear in no acute distress.   Mental Status:   Oriented to person, place, problem, and time.    Memory: Good immediate recall.  Intact remote memory  Normal attention span and concentration.  Language: intact naming, repeating and fluency   Good fund of Knowledge. Aware of current events and vocabulary   Cranial Nerves:   II: Pupils: equal, round, reactive to light  III,IV,VI: Extra Ocular Movements are intact. No nystagmus  V: Facial sensation is intact   VII: Facial strength and movements: intact and symmetric  XII: Tongue movements are normal  Musculoskeletal: 5/5 in all 4 extremities.   Tone: Normal tone.   Coordination: no tremors or drift  DTR: symmetric 2 in UL and LL  Sensation: normal to all modalities in both arms and legs.  Gait/Posture: steady gait     ROS : A 10-14 system review of constitutional, cardiovascular, respiratory, GI, eyes, , ENT, musculoskeletal, endocrine, hematological, skin, SHEENT, genitourinary, psychiatric and neurologic systems was obtained and updated today which is unremarkable except as mentioned in my HPI      Medical decision making:    A. Problems (any 1)    High:    [] Acute/Chronic Illness/injury posing threat to life or bodily function:    [] Severe exacerbation of chronic illness:      Moderate:    [x]     1 or more chronic illness with exacerbation, progression or side effect of treatment or  [x]     2 or more stable chronic illnesses or  []     1 acute illness with systemic symptoms       Mild:  []     1 stable chronic illness     ---------------------------------------------------------------------  B. Risk of Treatment (any 1)   [] Drugs/treatments that require intensive monitoring for toxicity include:      [x] Prescription drug management  ----------------------------------------------------------------------  [] High (any 2)  [x] Moderate (any 1)    C. Data (any 2 for high and any 1 for moderate)  [] Discussed

## 2024-03-22 ENCOUNTER — HOSPITAL ENCOUNTER (OUTPATIENT)
Dept: PHYSICAL THERAPY | Age: 54
Setting detail: THERAPIES SERIES
Discharge: HOME | End: 2024-03-22
Attending: ORTHOPAEDIC SURGERY
Payer: COMMERCIAL

## 2024-03-22 PROCEDURE — 97110 THERAPEUTIC EXERCISES: CPT

## 2024-03-22 PROCEDURE — 20560 NDL INSJ W/O NJX 1 OR 2 MUSC: CPT

## 2024-03-22 PROCEDURE — 97112 NEUROMUSCULAR REEDUCATION: CPT

## 2024-03-22 NOTE — FLOWSHEET NOTE
[facility] - Outpatient Rehabilitation and Therapy [address], [place] [address] office: [phone] fax: [phone]    Physical Therapy: TREATMENT/PROGRESS NOTE   Patient: Stefania Cross (54 y.o. female)   Examination Date: 2024   :  1970 MRN: [id number]   Visit #: 2   Insurance Allowable Auth Needed   BMN []Yes    [x]No    Insurance: Payor: BCBS / Plan: BCBS - [place] PPO / Product Type: *No Product type* /   Insurance ID: [id number] - ([place] [employer])  Secondary Insurance (if applicable):    Treatment Diagnosis:     ICD-10-CM    1. Weakness of right lower extremity  R29.898       2. Acute pain of right knee  M25.561       3. Decreased ROM of right toe  M25.674          Medical Diagnosis:  Right knee pain, unspecified chronicity [M25.561]   Referring Physician: Venu Antony MD  PCP: Ermelinda Sanchez DO     Plan of care signed (Y/N): Y    Date of Patient follow up with Physician:      Progress Report/POC: NO  POC update due: (10 visits /OR AUTH LIMITS, whichever is less)  2024                                             Precautions/ Contra-indications:           Latex allergy:  NO  Pacemaker:    NO  Contraindications for Manipulation: None  Date of Surgery: na  Other:    Red Flags:  None    C-SSRS Triggered by Intake questionnaire:   [x] No, Questionnaire did not trigger screening.   [] Yes, Patient intake triggered further evaluation      [] C-SSRS Screening completed  [] PCP notified via Plan of Care  [] Emergency services notified     Preferred Language for Healthcare:   [x] English       [] other:    SUBJECTIVE EXAMINATION     Patient stated complaint: She reports orthotic has been helpful in reducing pain; \"its loud when I walk.\" She lost her HEP sheets but remembers the toe yoga and towel crunches - and her foot feels better today. She reports meloxicam & cortisone is helping too.     Test used Initial score  3/15/24 2024   Pain Summary VAS 0-5 2

## 2024-03-30 ENCOUNTER — HOSPITAL ENCOUNTER (OUTPATIENT)
Dept: PHYSICAL THERAPY | Age: 54
Setting detail: THERAPIES SERIES
Discharge: HOME | End: 2024-03-30
Attending: ORTHOPAEDIC SURGERY
Payer: COMMERCIAL

## 2024-03-30 PROCEDURE — 97110 THERAPEUTIC EXERCISES: CPT

## 2024-03-30 PROCEDURE — 97112 NEUROMUSCULAR REEDUCATION: CPT

## 2024-03-30 NOTE — FLOWSHEET NOTE
Code Tx Minutes 44 3       Total Treatment Minutes 44      Charge Justification:  (25290) THERAPEUTIC EXERCISE - Provided verbal/tactile cueing for activities related to strengthening, flexibility, endurance, ROM performed to prevent loss of range of motion, maintain or improve muscular strength or increase flexibility, following either an injury or surgery.   (44858) HOME EXERCISE PROGRAM - Reviewed/Progressed HEP activities related to strengthening, flexibility, endurance, ROM performed to prevent loss of range of motion, maintain or improve muscular strength or increase flexibility, following either an injury or surgery.  (39074) NEUROMUSCULAR RE-EDUCATION - Therapeutic procedure, 1 or more areas, each 15 minutes; neuromuscular reeducation of movement, balance, coordination, kinesthetic sense, posture, and/or proprioception for sitting and/or standing activities  (43870) HOME EXERCISE PROGRAM - Reviewed/Progressed HEP activities related to neuromuscular reeducation of movement, balance, coordination, kinesthetic sense, posture, and/or proprioception for sitting and/or standing activities    (04594) MANUAL THERAPY -  Manual therapy techniques, 1 or more regions, each 15 minutes (Mobilization/manipulation, manual lymphatic drainage, manual traction) for the purpose of modulating pain, promoting relaxation,  increasing ROM, reducing/eliminating soft tissue swelling/inflammation/restriction, improving soft tissue extensibility and allowing for proper ROM for normal function with self care, mobility, lifting and ambulation    GOALS     Patient stated goal: walk pain free; return to [facility] work outs  Status: [] Progressing: [] Met: [] Not Met: [] Adjusted    Therapist goals for Patient:   Short Term Goals: To be achieved in: 2 weeks  Independent in HEP and progression per patient tolerance, in order to progress toward full function and prevent re-injury.    Status: [] Progressing: [] Met: [] Not Met: []

## 2024-04-01 RX ORDER — MELOXICAM 15 MG/1
15 TABLET ORAL DAILY
Qty: 30 TABLET | Refills: 0 | OUTPATIENT
Start: 2024-04-01

## 2024-04-02 ENCOUNTER — HOSPITAL ENCOUNTER (OUTPATIENT)
Dept: PHYSICAL THERAPY | Age: 54
Setting detail: THERAPIES SERIES
End: 2024-04-02
Attending: ORTHOPAEDIC SURGERY
Payer: COMMERCIAL

## 2024-04-05 ENCOUNTER — HOSPITAL ENCOUNTER (OUTPATIENT)
Dept: PHYSICAL THERAPY | Age: 54
Setting detail: THERAPIES SERIES
Discharge: HOME | End: 2024-04-05
Attending: ORTHOPAEDIC SURGERY
Payer: COMMERCIAL

## 2024-04-05 PROCEDURE — 97112 NEUROMUSCULAR REEDUCATION: CPT

## 2024-04-05 PROCEDURE — 20561 NDL INSJ W/O NJX 3+ MUSC: CPT

## 2024-04-05 PROCEDURE — 97150 GROUP THERAPEUTIC PROCEDURES: CPT

## 2024-04-05 NOTE — FLOWSHEET NOTE
to allow for appropriate push off and stance phase with gait.  Status: [] Progressing: [] Met: [] Not Met: [] Adjusted  Pt will demo symmetrical and pain free 1 min SL squat test for safe return to [facility] exercise program.   Status: [] Progressing: [] Met: [] Not Met: [] Adjusted  Patient will return to walking up to 45 min without increased symptoms or restriction to work towards return to prior level of function with grocery shopping.  Status: [] Progressing: [] Met: [] Not Met: [] Adjusted  Pt will return to jumping jacks x30 sec without pain to resume [facility] exercise program.           Status: [] Progressing: [] Met: [] Not Met: [] Adjusted    TREATMENT PLAN     Plan:  dynamic stability/balance progressions, foot intrinsic/extrinsic strengthening, calf/soleus strengthening,     Electronically Signed by Esperanza Mitchell, PT  DPT, ATC Date: 04/05/2024     Note: Portions of this note have been templated and/or copied from initial evaluation, reassessments and prior notes for documentation efficiency.

## 2024-04-10 ENCOUNTER — HOSPITAL ENCOUNTER (OUTPATIENT)
Dept: PHYSICAL THERAPY | Age: 54
Setting detail: THERAPIES SERIES
Discharge: HOME | End: 2024-04-10
Attending: ORTHOPAEDIC SURGERY
Payer: COMMERCIAL

## 2024-04-10 PROCEDURE — 97110 THERAPEUTIC EXERCISES: CPT

## 2024-04-10 PROCEDURE — 97140 MANUAL THERAPY 1/> REGIONS: CPT

## 2024-04-10 NOTE — FLOWSHEET NOTE
increasing ROM, reducing/eliminating soft tissue swelling/inflammation/restriction, improving soft tissue extensibility and allowing for proper ROM for normal function with self care, mobility, lifting and ambulation    GOALS     Patient stated goal: walk pain free; return to [facility] work outs  Status: [] Progressing: [] Met: [] Not Met: [] Adjusted    Therapist goals for Patient:   Short Term Goals: To be achieved in: 2 weeks  Independent in HEP and progression per patient tolerance, in order to progress toward full function and prevent re-injury.    Status: [] Progressing: [] Met: [] Not Met: [] Adjusted  Patient will have a decrease in pain to 0/10 with household ambulation.   Status: [] Progressing: [] Met: [] Not Met: [] Adjusted    Long Term Goals: To be achieved in: 4-6 weeks  Disability index score of 5% or less for the LEFS to assist with return top prior level of function.   Status: [] Progressing: [] Met: [] Not Met: [] Adjusted  Pt will demo L great toe ext to 60 and DF 10 or more to allow for appropriate push off and stance phase with gait.  Status: [] Progressing: [] Met: [] Not Met: [] Adjusted  Pt will demo symmetrical and pain free 1 min SL squat test for safe return to [facility] exercise program.   Status: [] Progressing: [] Met: [] Not Met: [] Adjusted  Patient will return to walking up to 45 min without increased symptoms or restriction to work towards return to prior level of function with grocery shopping.  Status: [] Progressing: [] Met: [] Not Met: [] Adjusted  Pt will return to jumping jacks x30 sec without pain to resume [facility] exercise program.           Status: [] Progressing: [] Met: [] Not Met: [] Adjusted    TREATMENT PLAN     Plan:  DN along L SIJ and QL    Electronically Signed by Esperanza Mitchell, PT  DPT, ATC Date: 04/10/2024     Note: Portions of this note have been templated and/or copied from initial evaluation, reassessments and prior notes for documentation efficiency.

## 2024-04-12 ENCOUNTER — HOSPITAL ENCOUNTER (OUTPATIENT)
Dept: PHYSICAL THERAPY | Age: 54
Setting detail: THERAPIES SERIES
Discharge: HOME | End: 2024-04-12
Attending: ORTHOPAEDIC SURGERY
Payer: COMMERCIAL

## 2024-04-12 PROCEDURE — 97112 NEUROMUSCULAR REEDUCATION: CPT

## 2024-04-12 PROCEDURE — 97110 THERAPEUTIC EXERCISES: CPT

## 2024-04-12 PROCEDURE — 97140 MANUAL THERAPY 1/> REGIONS: CPT

## 2024-04-12 PROCEDURE — 20560 NDL INSJ W/O NJX 1 OR 2 MUSC: CPT

## 2024-04-12 NOTE — FLOWSHEET NOTE
15 3/22   Calf Press 50# 2 15 3/22   ADD stretch  30\" 2R    SLR   2 10 3/30   HEP review 5'      Butt busters (Hip abd>hip flex/ext>hip circles>clamshells)  1 10 each super set 3/30   Clamshell [place]  1 10 3/30   QL stretch at door and 1/2 kneel  30\" 3ea    IB squat 15 2 15 4/12   Pt education on self STM with ball, sleeping positions, avoiding sitting with legs crossed 5'      Manual Intervention (63906) -   TIME     Great toe dorsal glides G2-3     Great toe PROM (flex/ext)      Forefoot mobs (all rays): flex/ext/rotation      Great toe distraction      SIJ assessment 8'      Shotgun MET, L SIJ PA with prone press up and rot, chicago roll  L long axis distraction manip  Sacral assessment 15      Massage gun L paraspinals, glutes R ADD/hamstring 10'      L post innominate correction and shotgun MET 5'      R tibial distraction at EOM  4 min     NMR re-education (74447) - 5'- shoes off for all resistance Sets/time Reps CUES NEEDED   Toe Yoga     Short Foot  5'' 15    Towel Scrunches: standing/seated  1 25 ea    Seated HR with BS Future visit      SLS on airex with medial arch pull with RDL  With great toe ext and other toe eat blue  15ea  L only    Firehydrant on airex lime 2 10B    Tb at arches with        Lateral walk- band on arches lime 2 Mirror lap     SLS on airex with hip ABD  lime 2 10B    Bosu squat black up Lime Tb at knees 2 15    Bosu step up  blue up  5\" 15B    Table top tap  TrA isometric + march 1  1 10 B  15 B 3/30-reports LBP with table top tap, able to tolerate march without symptoms   Therapeutic Activity (24773)  Sets/time                                        3/22; 4/5, 4/12  6 min   - Dry needling manual therapy: consisted on the placement of 2 needles in the following muscles:  L QL.  A 60 mm needle was inserted, piston, rotated, and coned to produce intramuscular mobilization.  These techniques were used to restore functional range of motion, reduce muscle spasm and induce healing in the

## 2024-04-17 ENCOUNTER — HOSPITAL ENCOUNTER (OUTPATIENT)
Dept: PHYSICAL THERAPY | Age: 54
Setting detail: THERAPIES SERIES
Discharge: HOME | End: 2024-04-17
Attending: ORTHOPAEDIC SURGERY
Payer: COMMERCIAL

## 2024-04-17 PROCEDURE — 97530 THERAPEUTIC ACTIVITIES: CPT

## 2024-04-17 PROCEDURE — 20560 NDL INSJ W/O NJX 1 OR 2 MUSC: CPT

## 2024-04-17 PROCEDURE — 97140 MANUAL THERAPY 1/> REGIONS: CPT

## 2024-04-17 PROCEDURE — 97110 THERAPEUTIC EXERCISES: CPT

## 2024-04-17 NOTE — PLAN OF CARE
Met: [] Not Met: [] Adjusted  Pt will return to jumping jacks x30 sec without pain to resume [employer]opia exercise program.           Status: [x] Progressing: [] Met: [] Not Met: [] Adjusted    TREATMENT PLAN     Plan:  progress dynamic stability and functional strength positions-lunge/squat; continue DN PRN for L QL    Electronically Signed by Esperanza Mitchell, PT  DPT, ATC Date: 04/17/2024     Note: Portions of this note have been templated and/or copied from initial evaluation, reassessments and prior notes for documentation efficiency.

## 2024-04-19 ENCOUNTER — HOSPITAL ENCOUNTER (OUTPATIENT)
Dept: PHYSICAL THERAPY | Age: 54
Setting detail: THERAPIES SERIES
Discharge: HOME | End: 2024-04-19
Attending: ORTHOPAEDIC SURGERY
Payer: COMMERCIAL

## 2024-04-19 PROCEDURE — 97110 THERAPEUTIC EXERCISES: CPT

## 2024-04-19 PROCEDURE — 97530 THERAPEUTIC ACTIVITIES: CPT

## 2024-04-19 PROCEDURE — 97140 MANUAL THERAPY 1/> REGIONS: CPT

## 2024-04-19 NOTE — FLOWSHEET NOTE
[facility] - Outpatient Rehabilitation and Therapy [address], [place] [address] office: [phone] fax: [phone]    Physical Therapy: TREATMENT/PROGRESS NOTE   Patient: Stefania Cross (54 y.o. female)   Examination Date: 2024   :  1970 MRN: [id number]   Visit #: 8   Insurance Allowable Auth Needed   BMN []Yes    [x]No    Insurance: Payor: BCBS / Plan: BCBS - [place] PPO / Product Type: *No Product type* /   Insurance ID: [id number] - ([place] BCBS)  Secondary Insurance (if applicable):    Treatment Diagnosis:     ICD-10-CM    1. Weakness of right lower extremity  R29.898       2. Acute pain of right knee  M25.561       3. Decreased ROM of right toe  M25.674          Medical Diagnosis:  Right knee pain, unspecified chronicity [M25.561]   Referring Physician: Venu Antony MD  PCP: Ermelinda Sanchez DO     Plan of care signed (Y/N): Y    Date of Patient follow up with Physician:      Progress Report/POC:   POC update due: (10 visits /OR AUTH LIMITS, whichever is less)  2024                                             Precautions/ Contra-indications:           Latex allergy:  NO  Pacemaker:    NO  Contraindications for Manipulation: None  Date of Surgery: na  Other:    Red Flags:  None    C-SSRS Triggered by Intake questionnaire:   [x] No, Questionnaire did not trigger screening.   [] Yes, Patient intake triggered further evaluation      [] C-SSRS Screening completed  [] PCP notified via Plan of Care  [] Emergency services notified     Preferred Language for Healthcare:   [x] English       [] other:    SUBJECTIVE EXAMINATION     Patient stated complaint: Pt reports L side is improving. Last night her R knee felt swollen but not sure why. Has started ice cup massage.       Test used Initial score  3/15/24 2024   Pain Summary VAS 0-5 2 in L side   Functional questionnaire LEFS 65, 18.75%    Other:              Pain:  Pain location: R medial knee and great toe

## 2024-04-24 ENCOUNTER — APPOINTMENT (OUTPATIENT)
Dept: PHYSICAL THERAPY | Age: 54
End: 2024-04-24
Attending: ORTHOPAEDIC SURGERY
Payer: COMMERCIAL

## 2024-05-01 ENCOUNTER — APPOINTMENT (OUTPATIENT)
Dept: PHYSICAL THERAPY | Age: 54
End: 2024-05-01
Attending: ORTHOPAEDIC SURGERY
Payer: COMMERCIAL

## 2024-05-03 ENCOUNTER — HOSPITAL ENCOUNTER (OUTPATIENT)
Dept: PHYSICAL THERAPY | Age: 54
Setting detail: THERAPIES SERIES
Discharge: HOME | End: 2024-05-03
Attending: ORTHOPAEDIC SURGERY
Payer: COMMERCIAL

## 2024-05-03 PROCEDURE — 97140 MANUAL THERAPY 1/> REGIONS: CPT

## 2024-05-03 PROCEDURE — 97110 THERAPEUTIC EXERCISES: CPT

## 2024-05-03 NOTE — FLOWSHEET NOTE
[facility] - Outpatient Rehabilitation and Therapy [address], [place] [address] office: [phone] fax: [phone]    Physical Therapy: TREATMENT/PROGRESS NOTE   Patient: Stefania Cross (54 y.o. female)   Examination Date: 2024   :  1970 MRN: [id number]   Visit #: 9   Insurance Allowable Auth Needed   BMN []Yes    [x]No    Insurance: Payor: BCBS / Plan: BCBS - [place] PPO / Product Type: *No Product type* /   Insurance ID: [id number] - ([place] BCBS)  Secondary Insurance (if applicable):    Treatment Diagnosis:     ICD-10-CM    1. Weakness of right lower extremity  R29.898       2. Acute pain of right knee  M25.561       3. Decreased ROM of right toe  M25.674          Medical Diagnosis:  Right knee pain, unspecified chronicity [M25.561]   Referring Physician: Venu Antony MD  PCP: Ermelinda Sanchez DO     Plan of care signed (Y/N): Y    Date of Patient follow up with Physician:      Progress Report/POC:   POC update due: (10 visits /OR AUTH LIMITS, whichever is less)  2024                                             Precautions/ Contra-indications:           Latex allergy:  NO  Pacemaker:    NO  Contraindications for Manipulation: None  Date of Surgery: na  Other:    Red Flags:  None    C-SSRS Triggered by Intake questionnaire:   [x] No, Questionnaire did not trigger screening.   [] Yes, Patient intake triggered further evaluation      [] C-SSRS Screening completed  [] PCP notified via Plan of Care  [] Emergency services notified     Preferred Language for Healthcare:   [x] English       [] other:    SUBJECTIVE EXAMINATION     Patient stated complaint: Pt reports that she is feeling pretty good. Has been having no discomfort in the toe now that she switched tennis shoes. Her knee has been pretty good. Still having a bit of pain in the hip but pain is no longer keeping her awake. Feels her tens unit is helpful for her symptoms. Her low back / hip are bothering

## 2024-05-07 ENCOUNTER — HOSPITAL ENCOUNTER (OUTPATIENT)
Dept: PHYSICAL THERAPY | Age: 54
Setting detail: THERAPIES SERIES
Discharge: HOME | End: 2024-05-07
Attending: ORTHOPAEDIC SURGERY
Payer: COMMERCIAL

## 2024-05-07 PROCEDURE — 97530 THERAPEUTIC ACTIVITIES: CPT

## 2024-05-07 PROCEDURE — 20560 NDL INSJ W/O NJX 1 OR 2 MUSC: CPT

## 2024-05-07 PROCEDURE — 97110 THERAPEUTIC EXERCISES: CPT

## 2024-05-07 NOTE — FLOWSHEET NOTE
1  1 10 B  15 B 3/30-reports LBP with table top tap, able to tolerate march without symptoms   Therapeutic Activity (49671)  Sets/time               [place]y step down 6in 2 10B    LSD 8in 2 10B    FSD on IB IB  +2in 1  2 10  8    IB squat- 5\" conc and ecc  No tempo 15  0 1  1 15  15    Wall sit  20\" 3      3/22; 4/5, 4/12, 5/7  8 min   - Dry needling manual therapy: consisted on the placement of 4 needles in the following muscles:  L QL (3 way), threading L lumbar paraspinals.  A 50-60 mm needle was inserted, piston, rotated, and coned to produce intramuscular mobilization.  These techniques were used to restore functional range of motion, reduce muscle spasm and induce healing in the corresponding musculature. (47692)  Clean Technique was utilized today while applying Dry needling treatment.  The treatment sites where cleaned with 70% solution of  isopropyl alcohol .  The PT washed their hands and utilized treatment gloves along with hand  prior to inserting the needles.  All needles where removed and discarded in the appropriate sharps container.  MD has given verbal and/or written approval for this treatment.     Modalities:    No modalities applied this session    Education/Home Exercise Program:   5/7 pt educated on sx threshold for strength training  Access Code: [email or web address]  URL: [email or web address]  Date: 04/19/2024  Prepared by: Esperanza Combs-Kristina    Exercises  - Supine Hamstring Stretch with Strap  - 1 x daily - 7 x weekly - 3 sets - 30 sec hold  - Standing Hamstring Stretch on Chair  - 1 x daily - 7 x weekly - 3 sets - 30 sec hold  - Standing Clam with Resistance Loop  - 1 x daily - 7 x weekly - 2 sets - 15 reps  - Hip Abduction with Resistance Loop  - 1 x daily - 7 x weekly - 2-3 sets - 10 reps  - Side Stepping with Resistance at Ankles  - 1 x daily - 7 x weekly - 3 sets - 10 reps  - Standing Quadratus Lumborum Stretch with Doorway  - 1 x daily - 7 x weekly - 3 sets - 30 sec hold  - Half

## 2024-05-21 ENCOUNTER — APPOINTMENT (OUTPATIENT)
Dept: PHYSICAL THERAPY | Age: 54
End: 2024-05-21
Attending: ORTHOPAEDIC SURGERY
Payer: COMMERCIAL

## 2024-06-16 DIAGNOSIS — G43.109 MIGRAINE WITH AURA AND WITHOUT STATUS MIGRAINOSUS, NOT INTRACTABLE: ICD-10-CM

## 2024-10-22 ENCOUNTER — PATIENT MESSAGE (OUTPATIENT)
Dept: INTERNAL MEDICINE CLINIC | Age: 54
End: 2024-10-22

## 2024-10-22 DIAGNOSIS — M25.532 LEFT WRIST PAIN: Primary | ICD-10-CM

## 2024-10-22 DIAGNOSIS — M25.512 ACUTE PAIN OF LEFT SHOULDER: ICD-10-CM

## 2024-10-29 ENCOUNTER — OFFICE VISIT (OUTPATIENT)
Dept: NEUROLOGY | Age: 54
End: 2024-10-29
Payer: COMMERCIAL

## 2024-10-29 VITALS
SYSTOLIC BLOOD PRESSURE: 117 MMHG | HEIGHT: 65 IN | BODY MASS INDEX: 23.32 KG/M2 | WEIGHT: 140 LBS | DIASTOLIC BLOOD PRESSURE: 89 MMHG | HEART RATE: 83 BPM

## 2024-10-29 DIAGNOSIS — G43.109 MIGRAINE WITH AURA AND WITHOUT STATUS MIGRAINOSUS, NOT INTRACTABLE: Primary | ICD-10-CM

## 2024-10-29 PROCEDURE — 99213 OFFICE O/P EST LOW 20 MIN: CPT | Performed by: PSYCHIATRY & NEUROLOGY

## 2024-10-29 RX ORDER — FREMANEZUMAB-VFRM 225 MG/1.5ML
INJECTION SUBCUTANEOUS
Qty: 3 ADJUSTABLE DOSE PRE-FILLED PEN SYRINGE | Refills: 5 | Status: SHIPPED | OUTPATIENT
Start: 2024-10-29

## 2024-10-29 RX ORDER — RIMEGEPANT SULFATE 75 MG/75MG
TABLET, ORALLY DISINTEGRATING ORAL
Qty: 8 TABLET | Refills: 1 | Status: SHIPPED | OUTPATIENT
Start: 2024-10-29

## 2024-10-29 NOTE — PROGRESS NOTES
The patient came today for follow up regarding: Migraine headaches      Interval history:      The patient feels much better since her last visit.  Migraines are controlled on Ajovy injection monthly.  No side effects.  Frequency now is sporadic.  Be once every few weeks.  She takes Nurtec at the onset.  No daily headache.  She is not taking amitriptyline anymore.  She is on verapamil daily.  Blood pressure is controlled.  Other review of system was unremarkable.      Background history:    Patient with history of migraine type headaches.  Patient initially started seeing Dr. Covarrubias in 2019.  She reports symptoms started when she was young and elementary school.  Her headaches improved but prior to seeing Dr. Covarrubias in 2019, she reports, over the last few years, they have come back and they seem to be worse than prior headaches.  She would get headaches once weekly.  They can last several hours.  Description: Throbbing in nature, sometimes with scintillating scotoma with severe headaches.  Aggravating factors include bright lights and loud noises which can make her headache worse.  Patient reports strong family history of headaches on maternal side  Patient currently on Ajovy every 30 days.  Since starting Ajovy she reports headaches are controlled, approximately 1 headache monthly.  Patient also is on Nurtec as needed for breakthrough headaches.  She is also taking verapamil. She does get frequent dull headaches.  Patient also has neck pain and is taking amitriptyline.    Previously has tried topiramate but stopped due to kidney stones.  Also, patient did not tolerate propranolol.       Exam:   Constitutional:   Vitals:    10/29/24 1522   BP: 117/89   Pulse: 83   Weight: 63.5 kg (140 lb)   Height: 1.651 m (5' 5\")       General appearance:  Normal development and appear in no acute distress.   Mental Status:   Oriented to person, place, problem, and time.    Memory: Good immediate recall.  Intact remote memory  Normal

## 2024-10-29 NOTE — PATIENT INSTRUCTIONS
YOU MUST CONFIRM YOUR APPOINTMENT 1 DAY PRIOR OR IT WILL BE CANCELLED!!   Our office will call you 3 times the day prior to your appointment in an attempt to confirm.  Please return our call ASAP or confirm your appt through [facility] no later than 3 pm the day before your appointment.  If we do not hear back from you by 3 pm to confirm, your appointment will be cancelled & someone will be added into that slot from our wait list.

## 2024-11-05 ENCOUNTER — HOSPITAL ENCOUNTER (OUTPATIENT)
Dept: GENERAL RADIOLOGY | Age: 54
Discharge: HOME | End: 2024-11-05
Payer: COMMERCIAL

## 2024-11-05 ENCOUNTER — HOSPITAL ENCOUNTER (OUTPATIENT)
Age: 54
Discharge: HOME | End: 2024-11-05
Payer: COMMERCIAL

## 2024-11-05 DIAGNOSIS — M25.512 ACUTE PAIN OF LEFT SHOULDER: ICD-10-CM

## 2024-11-05 DIAGNOSIS — M25.532 LEFT WRIST PAIN: ICD-10-CM

## 2024-11-05 PROCEDURE — 73030 X-RAY EXAM OF SHOULDER: CPT

## 2024-11-05 PROCEDURE — 73100 X-RAY EXAM OF WRIST: CPT

## 2024-11-11 ENCOUNTER — OFFICE VISIT (OUTPATIENT)
Dept: INTERNAL MEDICINE CLINIC | Age: 54
End: 2024-11-11

## 2024-11-11 VITALS
SYSTOLIC BLOOD PRESSURE: 120 MMHG | BODY MASS INDEX: 23.26 KG/M2 | HEART RATE: 76 BPM | OXYGEN SATURATION: 96 % | DIASTOLIC BLOOD PRESSURE: 82 MMHG | HEIGHT: 65 IN | WEIGHT: 139.6 LBS

## 2024-11-11 DIAGNOSIS — R45.4 IRRITABILITY: Primary | ICD-10-CM

## 2024-11-11 DIAGNOSIS — L65.9 HAIR LOSS: ICD-10-CM

## 2024-11-11 DIAGNOSIS — M25.512 ACUTE PAIN OF LEFT SHOULDER: ICD-10-CM

## 2024-11-11 DIAGNOSIS — G89.29 CHRONIC PAIN OF LEFT THUMB: ICD-10-CM

## 2024-11-11 DIAGNOSIS — M79.645 CHRONIC PAIN OF LEFT THUMB: ICD-10-CM

## 2024-11-11 RX ORDER — VENLAFAXINE HYDROCHLORIDE 75 MG/1
75 CAPSULE, EXTENDED RELEASE ORAL DAILY
Qty: 30 CAPSULE | Refills: 2 | Status: SHIPPED | OUTPATIENT
Start: 2024-11-18

## 2024-11-11 RX ORDER — VENLAFAXINE HYDROCHLORIDE 37.5 MG/1
37.5 CAPSULE, EXTENDED RELEASE ORAL DAILY
Qty: 7 CAPSULE | Refills: 0 | Status: SHIPPED | OUTPATIENT
Start: 2024-11-11

## 2024-11-11 SDOH — ECONOMIC STABILITY: FOOD INSECURITY: WITHIN THE PAST 12 MONTHS, THE FOOD YOU BOUGHT JUST DIDN'T LAST AND YOU DIDN'T HAVE MONEY TO GET MORE.: NEVER TRUE

## 2024-11-11 SDOH — ECONOMIC STABILITY: INCOME INSECURITY: HOW HARD IS IT FOR YOU TO PAY FOR THE VERY BASICS LIKE FOOD, HOUSING, MEDICAL CARE, AND HEATING?: NOT HARD AT ALL

## 2024-11-11 SDOH — ECONOMIC STABILITY: FOOD INSECURITY: WITHIN THE PAST 12 MONTHS, YOU WORRIED THAT YOUR FOOD WOULD RUN OUT BEFORE YOU GOT MONEY TO BUY MORE.: NEVER TRUE

## 2024-11-11 NOTE — PATIENT INSTRUCTIONS
Start venlafaxine ER 37.5 mg 1 pill daily for 1 week then increase to 75 mg 1 pill daily.  You may take venlafaxine at any time of the day however, if it makes you sleepy, take at night and if it makes you feel awake, take in the morning. May make you a little nauseated at first. Please try to take for at least 2 weeks before stopping for side effects as it may take up to 2 weeks for your body to adjust. Possible side effects are nausea, diarrhea, headaches, feeling tired, feeling \"out of it\" or \"spacy\" . Will take about 2-4 weeks before you really start to notice if medication is helping. Onset will be gradual and family and friends may notice that you are doing better before you notice. If you feel like your depression is worsening, call immediately.

## 2024-11-11 NOTE — PROGRESS NOTES
tablet 1    albuterol sulfate  (90 Base) MCG/ACT inhaler TAKE 2 PUFFS BY MOUTH EVERY 6 HOURS AS NEEDED FOR WHEEZE 6.7 each 1    levocetirizine (XYZAL) 5 MG tablet Take 1 tablet by mouth nightly      fluticasone (VERAMYST) 27.5 MCG/SPRAY nasal spray by Nasal route every morning      ibuprofen (ADVIL;MOTRIN) 200 MG tablet Take 1 tablet by mouth every 6 hours as needed for Pain      meloxicam (MOBIC) 15 MG tablet Take 1 tablet by mouth daily (Patient not taking: Reported on 10/29/2024) 30 tablet 0    Fremanezumab-vfrm SOAJ 225 mg        No facility-administered medications prior to visit.       Patient'spast medical history, surgical history, family history, medications,  and allergies  were all reviewed and updated as appropriate today.    Review of Systems    /82   Pulse 76   Ht 1.651 m (5' 5\")   Wt 63.3 kg (139 lb 9.6 oz)   SpO2 96%   BMI 23.23 kg/m²   Physical Exam  Vitals and nursing note reviewed.   Constitutional:       Appearance: She is well-developed. She is not toxic-appearing.   HENT:      Head: Normocephalic.      Right Ear: Tympanic membrane, ear canal and external ear normal.      Left Ear: Tympanic membrane, ear canal and external ear normal.      Mouth/Throat:      Pharynx: No oropharyngeal exudate or posterior oropharyngeal erythema.   Eyes:      General: No scleral icterus.     Extraocular Movements: Extraocular movements intact.      Conjunctiva/sclera: Conjunctivae normal.      Pupils: Pupils are equal, round, and reactive to light.   Neck:      Thyroid: No thyroid mass or thyromegaly.      Vascular: No carotid bruit.   Cardiovascular:      Rate and Rhythm: Normal rate and regular rhythm.      Heart sounds: Normal heart sounds. No murmur heard.  Pulmonary:      Effort: Pulmonary effort is normal.      Breath sounds: Normal breath sounds.   Musculoskeletal:      Left shoulder: Tenderness present. Decreased range of motion.      Left hand: Tenderness present. Decreased range of

## 2024-11-15 DIAGNOSIS — L65.9 HAIR LOSS: ICD-10-CM

## 2024-11-15 LAB
FERRITIN SERPL IA-MCNC: 122 NG/ML (ref 15–150)
FOLATE SERPL-MCNC: 15 NG/ML (ref 4.78–24.2)
TSH SERPL DL<=0.005 MIU/L-ACNC: 1.76 UIU/ML (ref 0.27–4.2)
VIT B12 SERPL-MCNC: 534 PG/ML (ref 211–911)

## 2024-11-16 PROBLEM — G89.29 CHRONIC PAIN OF LEFT THUMB: Status: ACTIVE | Noted: 2024-11-16

## 2024-11-16 PROBLEM — M25.512 ACUTE PAIN OF LEFT SHOULDER: Status: ACTIVE | Noted: 2024-11-16

## 2024-11-16 PROBLEM — M79.645 CHRONIC PAIN OF LEFT THUMB: Status: ACTIVE | Noted: 2024-11-16

## 2024-11-16 PROBLEM — G43.919: Status: RESOLVED | Noted: 2019-06-08 | Resolved: 2024-11-16

## 2024-11-16 PROBLEM — M25.561 RIGHT MEDIAL KNEE PAIN: Status: RESOLVED | Noted: 2023-11-05 | Resolved: 2024-11-16

## 2024-11-16 PROBLEM — R45.4 IRRITABILITY: Status: ACTIVE | Noted: 2024-11-16

## 2024-11-16 PROBLEM — L65.9 HAIR LOSS: Status: ACTIVE | Noted: 2024-11-16

## 2024-11-19 LAB — VIT B1 SERPL-MCNC: 40 NMOL/L (ref 4–15)

## 2024-11-20 ENCOUNTER — OFFICE VISIT (OUTPATIENT)
Dept: ORTHOPEDIC SURGERY | Age: 54
End: 2024-11-20
Payer: COMMERCIAL

## 2024-11-20 VITALS — WEIGHT: 139 LBS | HEIGHT: 65 IN | BODY MASS INDEX: 23.16 KG/M2

## 2024-11-20 DIAGNOSIS — M75.22 BICEPS TENDINITIS OF LEFT SHOULDER: ICD-10-CM

## 2024-11-20 DIAGNOSIS — S46.812A STRAIN OF LEFT SUBSCAPULARIS MUSCLE, INITIAL ENCOUNTER: Primary | ICD-10-CM

## 2024-11-20 PROCEDURE — 99213 OFFICE O/P EST LOW 20 MIN: CPT | Performed by: PHYSICIAN ASSISTANT

## 2024-11-20 NOTE — PROGRESS NOTES
ORTHOPAEDIC NEW PATIENT NOTE    Chief Complaint   Patient presents with    New Patient     NP Lt Shoulder       HPI  11/20/24  54 y.o. female RHD seen in consultation at the request of Ermelinda Sanchez DO for evaluation of left shoulder pain:  Onset 6 months  Injury/trauma none  History of symptoms: Patient has been doing a new workout program for the last year or so.  No acute injury, but noticed very gradual onset of pain about 6 months ago while doing the exercises.  It has gotten worse over the last couple months  Pain is located over superior aspect of shoulder  Described as aching  Worse with lifting with no body or overhead, as well as behind the back activities  Patient has been going to stretch lab and has also decreased the frequency of her workouts, and she has noticed an improvement in her pain  Has also tried anti-inflammatories, with no relief  Neck pain = yes, mild and intermittent  Radicular symptoms = none  Numbness and/or tingling = none      Review of Systems  Constitutional - denies fevers, weight loss  Cardiovascular - denies chest pain, palpitations, peripheral edema, blood clots  Respiratory - denies SOB, cough  Gastrointestinal - denies abdominal pain, nausea, vomiting  Genitourinary - denies dysuria, discharge  Musculoskeletal - per HPI  Integumentary - denies rash, sores  Neurologic - denies numbness, tingling, paresthesias  Hematologic - denies abnormal bleeding, blood clots  Allergic/Immunologic - denies metal allergies, recurrent infections    Allergies   Allergen Reactions    Zithromax [Azithromycin] Rash        Current Outpatient Medications   Medication Sig Dispense Refill    venlafaxine (EFFEXOR XR) 37.5 MG extended release capsule Take 1 capsule by mouth daily 7 capsule 0    venlafaxine (EFFEXOR XR) 75 MG extended release capsule Take 1 capsule by mouth daily 30 capsule 2    Fremanezumab-vfrm (AJOVY) 225 MG/1.5ML SOAJ INJECT 1.5 ML SUBCUTANEOUSLY EVERY 30 DAYS 3 Adjustable Dose

## 2024-11-21 LAB — VIT B6 SERPL-MCNC: 422.2 NMOL/L (ref 20–125)

## 2024-11-25 ENCOUNTER — TELEPHONE (OUTPATIENT)
Dept: INTERNAL MEDICINE CLINIC | Age: 54
End: 2024-11-25

## 2024-11-25 NOTE — TELEPHONE ENCOUNTER
Called patient gave her lab results. She stated that she is only taking a multivitamin and Vitamin D. Please clarify \"all supplements\"

## 2024-11-26 ENCOUNTER — HOSPITAL ENCOUNTER (OUTPATIENT)
Dept: PHYSICAL THERAPY | Age: 54
Setting detail: THERAPIES SERIES
Discharge: HOME | End: 2024-11-26
Attending: INTERNAL MEDICINE
Payer: COMMERCIAL

## 2024-11-26 DIAGNOSIS — M25.612 DECREASED ROM OF LEFT SHOULDER: ICD-10-CM

## 2024-11-26 DIAGNOSIS — R29.898 IMPAIRED STRENGTH OF SHOULDER MUSCLES: Primary | ICD-10-CM

## 2024-11-26 DIAGNOSIS — M43.6 STIFFNESS OF CERVICAL SPINE: ICD-10-CM

## 2024-11-26 DIAGNOSIS — R29.3 POSTURAL IMBALANCE: ICD-10-CM

## 2024-11-26 PROCEDURE — 97110 THERAPEUTIC EXERCISES: CPT

## 2024-11-26 PROCEDURE — 97530 THERAPEUTIC ACTIVITIES: CPT

## 2024-11-26 PROCEDURE — 97161 PT EVAL LOW COMPLEX 20 MIN: CPT

## 2024-11-26 NOTE — PLAN OF CARE
[facility] - Outpatient Rehabilitation and Therapy [address], [place] [address] office: [phone] fax: [phone]     Physical Therapy Initial Evaluation Certification      Dear Ermelinda Sanchez DO ,    We had the pleasure of evaluating the following patient for physical therapy services at [facility] Outpatient Physical Therapy.  A summary of our findings can be found in the initial assessment below.  This includes our plan of care.  If you have any questions or concerns regarding these findings, please do not hesitate to contact me at the office phone number listed above.  Thank you for the referral.     Physician Signature:_______________________________Date:__________________  By signing above (or electronic signature), therapist’s plan is approved by physician       Physical Therapy: TREATMENT/PROGRESS NOTE   Patient: Stefania Cross (54 y.o. female)   Examination Date: 2024   :  1970 MRN: [id number]   Visit #:   Insurance Allowable Auth Needed   BCBS - OOP Met, BMN, no auth  []Yes    [x]No    Insurance: Payor: [place] BCBS / Plan: BCBS [place] PPO / Product Type: *No Product type* /   Insurance ID: [id number] - ([place] BCBS)  Secondary Insurance (if applicable):    Treatment Diagnosis:     ICD-10-CM    1. Impaired strength of shoulder muscles  R29.898       2. Postural imbalance  R29.3       3. Stiffness of cervical spine  M43.6       4. Decreased ROM of left shoulder  M25.612          Medical Diagnosis:  Acute pain of left shoulder [M25.512]   Referring Physician: Ermelinda Sanchez DO  PCP: Ermelinda Sanchez DO     Plan of care signed (Y/N):     Date of Patient follow up with Physician:      Plan of Care Report: EVAL today  POC update due: (10 visits /OR AUTH LIMITS, whichever is less)  2024                                             Medical History:  Comorbidities:    Relevant Medical History: IBS, hx of migrains, asthma, allergies

## 2024-12-03 ENCOUNTER — APPOINTMENT (OUTPATIENT)
Dept: PHYSICAL THERAPY | Age: 54
End: 2024-12-03
Attending: INTERNAL MEDICINE
Payer: COMMERCIAL

## 2024-12-03 ENCOUNTER — HOSPITAL ENCOUNTER (OUTPATIENT)
Dept: PHYSICAL THERAPY | Age: 54
Setting detail: THERAPIES SERIES
Discharge: HOME | End: 2024-12-03
Attending: INTERNAL MEDICINE
Payer: COMMERCIAL

## 2024-12-03 PROCEDURE — 97140 MANUAL THERAPY 1/> REGIONS: CPT

## 2024-12-03 RX ORDER — VENLAFAXINE HYDROCHLORIDE 75 MG/1
CAPSULE, EXTENDED RELEASE ORAL DAILY
Qty: 90 CAPSULE | Refills: 1 | Status: SHIPPED | OUTPATIENT
Start: 2024-12-03

## 2024-12-03 NOTE — FLOWSHEET NOTE
[facility] - Outpatient Rehabilitation and Therapy [address], [place] [address] office: [phone] fax: [phone]       Physical Therapy: TREATMENT/PROGRESS NOTE   Patient: Stefania Cross (54 y.o. female)   Examination Date: 2024   :  1970 MRN: [id number]   Visit #:   Insurance Allowable Auth Needed   BCBS - OOP Met, BMN, no auth  []Yes    [x]No    Insurance: Payor: [place] BCBS / Plan: [place] / Product Type: *No Product type* /   Insurance ID: [id number] - ([place] BCBS)  Secondary Insurance (if applicable):    Treatment Diagnosis:     ICD-10-CM    1. Impaired strength of shoulder muscles  R29.898       2. Postural imbalance  R29.3       3. Stiffness of cervical spine  M43.6       4. Decreased ROM of left shoulder  M25.612          Medical Diagnosis:  Acute pain of left shoulder [M25.512]   Referring Physician: Ermelinda Sanchez DO  PCP: Ermelinda Sanchez DO     Plan of care signed (Y/N):     Date of Patient follow up with Physician:      Plan of Care Report: NO  POC update due: (10 visits /OR AUTH LIMITS, whichever is less)  2024                                             Medical History:  Comorbidities:    Relevant Medical History: IBS, hx of migrains, asthma, allergies                                         Precautions/ Contra-indications:           Latex allergy:  NO  Pacemaker:    NO  Contraindications for Manipulation: None  Date of Surgery: NA  Other:    Red Flags:  None    Suicide Screening:   The patient did not verbalize a primary behavioral concern, suicidal ideation, suicidal intent, or demonstrate suicidal behaviors.    Preferred Language for Healthcare:   [x] English       [] other:    SUBJECTIVE EXAMINATION     Patient stated complaint: pt arrived late to session but agreeable to a shorter session. States she did a workout for her arms/core yesterday and feels like it has irritated her L  UT a little bit. The pain is there.states

## 2024-12-06 ENCOUNTER — HOSPITAL ENCOUNTER (OUTPATIENT)
Dept: PHYSICAL THERAPY | Age: 54
Setting detail: THERAPIES SERIES
Discharge: HOME | End: 2024-12-06
Attending: INTERNAL MEDICINE
Payer: COMMERCIAL

## 2024-12-06 PROCEDURE — 97110 THERAPEUTIC EXERCISES: CPT

## 2024-12-06 NOTE — FLOWSHEET NOTE
mobility. Positive for impingement, negative for apprehension, also demonstrates cervical ROM limitations and mobility. Would benefit from DN.  Pt. presents with the functional impairments and activity limitations listed below and would benefit from Outpatient PT to address the below impairments as well as improve pain, and restore function.     Functional Impairments:   Decreased UE functional ROM  Decreased UE functional strength  Decreased RC/scapular/core strength and neuromuscular control    Functional Activity Limitations (from functional questionnaire and intake):  Any of your usual work, housework, or school activities  Your usual hobbies, recreational or sporting activities  Lifting a bag of groceries above your head  Grooming your hair  Pushing up on your hands (eg, from bathtub or chair)  Preparing food (eg, peeling, cutting)  Vacuuming, sweeping or raking  Using tools or appliances  Sleeping  Laundering clothes (eg, washing, ironing, folding)  Opening a jar  Throwing a ball  Carrying a small suitcase with your affected limb  Reaching up into a cabinet  Reduced ability or tolerance with any impact through UE or Spine  Reaching behind back     Participation Restrictions:   Reduced participation in home management   Reduced participation in social activities  Reduced participation in sports/recreation    Classification :   Signs/symptoms consistent with shoulder impingement  Signs/symptoms consistent with postural dysfunction          Barriers to/and or personal factors that will affect rehab potential:   Age  Sex  Co-morbidities    Physical Therapy Evaluation Complexity Justification  [x] A history of present problem and 1-2 personal factors and/or co-morbidities that impact the plan of care  [x] A total of 3 elements found upon examination of body systems using standardized tests and measures addressing any of the following: body structures, functions (impairments), activity limitations, and/or

## 2024-12-10 ENCOUNTER — HOSPITAL ENCOUNTER (OUTPATIENT)
Dept: PHYSICAL THERAPY | Age: 54
Setting detail: THERAPIES SERIES
Discharge: HOME | End: 2024-12-10
Attending: INTERNAL MEDICINE
Payer: COMMERCIAL

## 2024-12-10 PROCEDURE — 97110 THERAPEUTIC EXERCISES: CPT

## 2024-12-10 PROCEDURE — 97112 NEUROMUSCULAR REEDUCATION: CPT

## 2024-12-10 NOTE — FLOWSHEET NOTE
Re-education (83618) activation and proprioception, including postural re-education.    Manual Therapy (10891) as indicated to include: Passive Range of Motion, Gr I-IV mobilizations, Dry Needling/IASTM, Trigger Point Release, and Myofascial Release  Modalities as needed that may include: Cryotherapy and Thermal Agents  Patient education on joint protection, postural re-education, activity modification, and progression of HEP    Plan: Cont POC- Continue emphasis/focus on exercise progression, improving proper muscle recruitment and activation/motor control patterns, modulating pain, increasing ROM, and improving postural awareness. Next visit plan to continue current phase     Electronically Signed by Jarrod Barnes PT, DPT, CNS  Date: 12/10/2024     Note: Portions of this note have been templated and/or copied from initial evaluation, reassessments and prior notes for documentation efficiency.    Note: If patient does not return for scheduled/recommended follow up visits, this note will serve as a discharge from care along with the most recent update on progress.    Ortho Evaluation

## 2024-12-12 ENCOUNTER — HOSPITAL ENCOUNTER (OUTPATIENT)
Dept: PHYSICAL THERAPY | Age: 54
Setting detail: THERAPIES SERIES
Discharge: HOME | End: 2024-12-12
Attending: INTERNAL MEDICINE
Payer: COMMERCIAL

## 2024-12-12 PROCEDURE — 97140 MANUAL THERAPY 1/> REGIONS: CPT

## 2024-12-12 PROCEDURE — 97112 NEUROMUSCULAR REEDUCATION: CPT

## 2024-12-12 PROCEDURE — 97110 THERAPEUTIC EXERCISES: CPT

## 2024-12-12 NOTE — FLOWSHEET NOTE
Therapeutic Activity (93762)  Sets/time     Edu about px, dx and POC. Benefits of MRI if no improvements in pain, discussed RTC muscle involvement and answered questions on impingement. Discussed importance of sleep posture       Edu about benefits of TENs Unit  x                            Modalities:    No modalities applied this session - ICF unattended for shoulder     Education/Home Exercise Program: Patient HEP program created electronically.  Refer to Proformative access code: [email or web address]  Access Code: [email or web address]  URL: [email or web address]  Date: 12/06/2024  Prepared by: Jarrod Gadagbui    Exercises  - Supine Shoulder Flexion Overhead with Dowel  - 1 x daily - 7 x weekly - 3 sets - 10 reps  - Supine Shoulder Abduction AAROM with Dowel  - 1 x daily - 7 x weekly - 3 sets - 10 reps  - Supine Shoulder External Rotation with Dowel  - 1 x daily - 7 x weekly - 3 sets - 10 reps    Patient Education  - Office Posture      12/6: emailed PCP regarding benefits of DN     Access Code: [email or web address]  URL: [email or web address]  Date: 11/26/2024  Prepared by: Jarrod Gadagbui    Exercises  - Single Arm Doorway Pec Stretch at 90 Degrees Abduction  - 1 x daily - 7 x weekly - 3 sets - 30 seconds hold  - Seated Scapular Retraction  - 1 x daily - 7 x weekly - 3 sets - 10 reps - 10 seconds hold  - Standing Isometric Shoulder Internal Rotation at Doorway  - 1 x daily - 7 x weekly - 3 sets - 10 reps - 10 seconds hold  - Isometric Shoulder Flexion at Wall  - 1 x daily - 7 x weekly - 3 sets - 10 reps - 10 seconds  hold  - Standing Isometric Shoulder External Rotation with Doorway  - 1 x daily - 7 x weekly - 3 sets - 10 reps - 10 seconds hold    Patient Education  - Sleep Positions    ASSESSMENT   Assessment:   Stefania Cross is a 54 y.o. female presenting today to Outpatient PT with signs and symptoms consistent with impingement LUE.  She demonstrates impaired LUE ROM, strength and joint mobility. Positive for

## 2024-12-13 ENCOUNTER — APPOINTMENT (OUTPATIENT)
Dept: PHYSICAL THERAPY | Age: 54
End: 2024-12-13
Attending: INTERNAL MEDICINE
Payer: COMMERCIAL

## 2024-12-17 ENCOUNTER — HOSPITAL ENCOUNTER (OUTPATIENT)
Dept: PHYSICAL THERAPY | Age: 54
Setting detail: THERAPIES SERIES
End: 2024-12-17
Attending: INTERNAL MEDICINE
Payer: COMMERCIAL

## 2024-12-19 ENCOUNTER — HOSPITAL ENCOUNTER (OUTPATIENT)
Dept: PHYSICAL THERAPY | Age: 54
Setting detail: THERAPIES SERIES
Discharge: HOME | End: 2024-12-19
Attending: INTERNAL MEDICINE
Payer: COMMERCIAL

## 2024-12-19 DIAGNOSIS — G43.109 MIGRAINE WITH AURA AND WITHOUT STATUS MIGRAINOSUS, NOT INTRACTABLE: ICD-10-CM

## 2024-12-19 PROCEDURE — 97110 THERAPEUTIC EXERCISES: CPT

## 2024-12-19 PROCEDURE — 97112 NEUROMUSCULAR REEDUCATION: CPT

## 2024-12-19 RX ORDER — VERAPAMIL HYDROCHLORIDE 120 MG/1
120 TABLET, FILM COATED, EXTENDED RELEASE ORAL EVERY EVENING
Qty: 90 TABLET | Refills: 1 | Status: SHIPPED | OUTPATIENT
Start: 2024-12-19

## 2024-12-19 NOTE — FLOWSHEET NOTE
limb  Reaching up into a cabinet  Reduced ability or tolerance with any impact through UE or Spine  Reaching behind back     Participation Restrictions:   Reduced participation in home management   Reduced participation in social activities  Reduced participation in sports/recreation    Classification :   Signs/symptoms consistent with shoulder impingement  Signs/symptoms consistent with postural dysfunction          Barriers to/and or personal factors that will affect rehab potential:   Age  Sex  Co-morbidities    Physical Therapy Evaluation Complexity Justification  [x] A history of present problem and 1-2 personal factors and/or co-morbidities that impact the plan of care  [x] A total of 3 elements found upon examination of body systems using standardized tests and measures addressing any of the following: body structures, functions (impairments), activity limitations, and/or participation restrictions  [x] A clinical presentation with stable and/or uncomplicated characteristics   [x] Clinical decision making of LOW (70855 - Typically 20 minutes face-to-face) complexity using standardized patient assessment instrument and/or measurable assessment of functional outcome.    Today's Assessment: During therapy this date, patient required verbal cueing and progression of exercises and program for exercise progression, improving proper muscle recruitment and activation/motor control patterns, modulating pain, and improving postural awareness.Patient will continue to benefit from ongoing evaluation and advanced clinical decision from a Physical Therapist to address and improve pain control, muscle strength, and neuromuscular control to safely return to PLOF without symptoms or restrictions. and Patient had good tolerance to today's session, reporting increased soreness, challenge, and difficulty with program completed. Able to progress  intensity on periscapular strengthening in open and closed chain kinematics.

## 2024-12-20 ENCOUNTER — APPOINTMENT (OUTPATIENT)
Dept: PHYSICAL THERAPY | Age: 54
End: 2024-12-20
Attending: INTERNAL MEDICINE
Payer: COMMERCIAL

## 2024-12-23 ENCOUNTER — APPOINTMENT (OUTPATIENT)
Dept: PHYSICAL THERAPY | Age: 54
End: 2024-12-23
Attending: INTERNAL MEDICINE
Payer: COMMERCIAL

## 2024-12-26 ENCOUNTER — HOSPITAL ENCOUNTER (OUTPATIENT)
Dept: PHYSICAL THERAPY | Age: 54
Setting detail: THERAPIES SERIES
Discharge: HOME | End: 2024-12-26
Attending: INTERNAL MEDICINE
Payer: COMMERCIAL

## 2024-12-26 PROCEDURE — 97530 THERAPEUTIC ACTIVITIES: CPT

## 2024-12-26 PROCEDURE — 97110 THERAPEUTIC EXERCISES: CPT

## 2024-12-26 NOTE — PLAN OF CARE
systems (Integumentary, CardioPulmonary, Neurological) by intake and observation. Intake form has been scanned into medical record. Patient has been instructed to contact their primary care physician regarding ROS issues if not already being addressed at this time.    [x] Patient history, allergies, meds reviewed. Medical chart reviewed. See intake form.     OBJECTIVE EXAMINATION   12/26/24:  Positive for 90/90 apprehension on LUE, slight increase in mobility noted in sup to inferior mobilizations grade II-III on L>R with pain and slight increase in n/t    Mvmt (norm) AROM L AROM R Notes PROM L PROM R Notes     CERVICAL                              SHOULDER Flexion (180) 148 deg increase in pain  Painful arc until about 165 deg  PROM: 180 degree 180 deg        Abduction (180) 180 deg pain at end range 180 deg        ER -0          ER -90 (90) 85 deg slight posterior shoulder pain 90 deg        IR -0          IR -90 (70) 80 deg  75 deg         ELBOW Flex/biceps (140)          Ext/triceps (0)          Pronation (80)          Supination (80)             WRIST Flexion (60)          Extension (60)          RD (20)          UD (20)                         MMT L MMT R Notes     CERVICAL Cerv flexion       Cerv extension       Cerv SB       Cerv rotation          SHOULDER Flexion 3+  4-     Abduction 3+ P 4-     ER -0       ER -90 4- pain 4-     IR -0       IR -90 4- P 4+      ELBOW Flex/biceps 5 pain in UT 5     Ext/triceps       Pronation       supination          WRIST Flexion       Extension       RD       UD               11/26/24  ROM/Strength: (Blank cells denote NT)    Mvmt (norm) AROM L AROM R Notes PROM L PROM R Notes     CERVICAL Flex (60) 45 deg       Ext (70) 45 deg       SB(45) 45 deg 45 deg        Rotation (80) 70 deg Pain on L 75 deg           SHOULDER Flexion (180) 153 deg (pain starts at 140 deg) 180 deg        Abduction (180) 150 deg pain in posterior shoulder (pain started at 90 deg) 180 deg

## 2024-12-31 ENCOUNTER — APPOINTMENT (OUTPATIENT)
Dept: PHYSICAL THERAPY | Age: 54
End: 2024-12-31
Attending: INTERNAL MEDICINE
Payer: COMMERCIAL

## 2025-01-03 ENCOUNTER — HOSPITAL ENCOUNTER (OUTPATIENT)
Dept: PHYSICAL THERAPY | Age: 55
Setting detail: THERAPIES SERIES
Discharge: HOME | End: 2025-01-03
Attending: INTERNAL MEDICINE
Payer: COMMERCIAL

## 2025-01-03 PROCEDURE — 97110 THERAPEUTIC EXERCISES: CPT

## 2025-01-03 PROCEDURE — 97140 MANUAL THERAPY 1/> REGIONS: CPT

## 2025-01-03 PROCEDURE — G0283 ELEC STIM OTHER THAN WOUND: HCPCS

## 2025-01-03 NOTE — FLOWSHEET NOTE
Pain:  Pain location: L side of neck, L shoulder  Patient describes pain to be intermittent, Sharp, and aching  Pain decreases with: Resting, tried medications without relief  Pain increases with: Activity and Movement and Reaching     Living status: lives at home with - 2 story home and steps to basement. Trouble with cleaning/ boxing and lifting.     Occupation/School:  Work/School Status: Full time - HR  Job Duties/Demands: Prolonged Sitting in front of computer - all in person   Lifting box every now and then, lifting water,     Hand Dominance: Right    Sport/ Recreation/ Leisure/ Hobbies: working out,     Review Of Systems (ROS):  [x] Performed Review of systems (Integumentary, CardioPulmonary, Neurological) by intake and observation. Intake form has been scanned into medical record. Patient has been instructed to contact their primary care physician regarding ROS issues if not already being addressed at this time.    [x] Patient history, allergies, meds reviewed. Medical chart reviewed. See intake form.     OBJECTIVE EXAMINATION   12/26/24:  Positive for 90/90 apprehension on LUE, slight increase in mobility noted in sup to inferior mobilizations grade II-III on L>R with pain and slight increase in n/t    Mvmt (norm) AROM L AROM R Notes PROM L PROM R Notes     CERVICAL                              SHOULDER Flexion (180) 148 deg increase in pain  Painful arc until about 165 deg  PROM: 180 degree 180 deg        Abduction (180) 180 deg pain at end range 180 deg        ER -0          ER -90 (90) 85 deg slight posterior shoulder pain 90 deg        IR -0          IR -90 (70) 80 deg  75 deg         ELBOW Flex/biceps (140)          Ext/triceps (0)          Pronation (80)          Supination (80)             WRIST Flexion (60)          Extension (60)          RD (20)          UD (20)                         MMT L MMT R Notes     CERVICAL Cerv flexion       Cerv extension       Cerv SB       Cerv rotation

## 2025-01-07 ENCOUNTER — APPOINTMENT (OUTPATIENT)
Dept: PHYSICAL THERAPY | Age: 55
End: 2025-01-07
Attending: INTERNAL MEDICINE
Payer: COMMERCIAL

## 2025-01-07 ENCOUNTER — HOSPITAL ENCOUNTER (OUTPATIENT)
Dept: PHYSICAL THERAPY | Age: 55
Setting detail: THERAPIES SERIES
Discharge: HOME | End: 2025-01-07
Attending: INTERNAL MEDICINE
Payer: COMMERCIAL

## 2025-01-07 PROCEDURE — 97140 MANUAL THERAPY 1/> REGIONS: CPT

## 2025-01-07 PROCEDURE — 97110 THERAPEUTIC EXERCISES: CPT

## 2025-01-07 PROCEDURE — 97530 THERAPEUTIC ACTIVITIES: CPT

## 2025-01-07 PROCEDURE — G0283 ELEC STIM OTHER THAN WOUND: HCPCS

## 2025-01-07 NOTE — FLOWSHEET NOTE
more regions, each 15 minutes (Mobilization/manipulation, manual lymphatic drainage, manual traction) for the purpose of modulating pain, promoting relaxation,  increasing ROM, reducing/eliminating soft tissue swelling/inflammation/restriction, improving soft tissue extensibility and allowing for proper ROM for normal function with self care, mobility, lifting and ambulation  (64040) UNATTENDED ESTIM. Electrical stimulation (unattended), to 1 or more areas for indication(s) other than wound care, as part of a therapy plan of care. ([facility] )    GOALS     Patient stated goal: resume working out regularly, decrease discomfort and improve sleep  [x] Progressing: [] Met: [] Not Met: [] Adjusted    Therapist goals for Patient:   Short Term Goals: To be achieved in: 2 weeks  1. Independent in HEP and progression per patient tolerance, in order to prevent re-injury.   [x] Progressing: [] Met: [] Not Met: [] Adjusted  2. Patient will have a decrease in pain to <2/10 to facilitate improvement in movement, function, and ADLs as indicated by Functional Deficits.  [x] Progressing: [] Met: [] Not Met: [] Adjusted    Long Term Goals: To be achieved in: 8 weeks  1. Disability index score of 8% or less for the Upper Extremity functional Scale to assist with reaching prior level of function with activities such as lifting items above head, using hair dryer and exercising.  [] Progressing: [] Met: [] Not Met: [] Adjusted - assess at npv  2. Patient will demonstrate increased AROM of LUE to 180 degrees without pain to allow for proper joint functioning to enable patient to reach above head without discomfort.   [x] Progressing: [] Met: [] Not Met: [] Adjusted  3. Patient will demonstrate increased Strength of BUE to at least 4+/5 throughout without pain to allow for proper functional mobility to enable patient to return to lifting, return to gym based activities.   [x] Progressing: [] Met: [] Not Met: [] Adjusted  4. Patient will

## 2025-01-10 ENCOUNTER — HOSPITAL ENCOUNTER (OUTPATIENT)
Dept: PHYSICAL THERAPY | Age: 55
Setting detail: THERAPIES SERIES
Discharge: HOME | End: 2025-01-10
Attending: INTERNAL MEDICINE
Payer: COMMERCIAL

## 2025-01-10 PROCEDURE — 97110 THERAPEUTIC EXERCISES: CPT

## 2025-01-10 PROCEDURE — G0283 ELEC STIM OTHER THAN WOUND: HCPCS

## 2025-01-10 PROCEDURE — 97112 NEUROMUSCULAR REEDUCATION: CPT

## 2025-01-10 PROCEDURE — 97140 MANUAL THERAPY 1/> REGIONS: CPT

## 2025-01-10 NOTE — FLOWSHEET NOTE
listed.  [] Progression has been slowed due to co-morbidities.  [x] Plan just implemented, too soon (<30days) to assess goals progression   [] Goals require adjustment due to lack of progress  [] Patient is not progressing as expected and requires additional follow up with physician  [] Other:     TREATMENT PLAN     Frequency/Duration: 2x/week for 8 weeks for the following treatment interventions:    Interventions:  Therapeutic Exercise (45499) including: strength training, ROM, and functional mobility  Therapeutic Activities (06876) including: functional mobility training and education.  Neuromuscular Re-education (04457) activation and proprioception, including postural re-education.    Manual Therapy (91467) as indicated to include: Passive Range of Motion, Gr I-IV mobilizations, Dry Needling/IASTM, Trigger Point Release, and Myofascial Release  Modalities as needed that may include: Cryotherapy and Thermal Agents  Patient education on joint protection, postural re-education, activity modification, and progression of HEP    Plan: Cont POC- Continue emphasis/focus on exercise progression, improving proper muscle recruitment and activation/motor control patterns, modulating pain, increasing ROM, and improving postural awareness. Next visit plan to continue current phase     Electronically Signed by Jarrod Barnes PT, DPT, CNS  Date: 01/10/2025     Note: Portions of this note have been templated and/or copied from initial evaluation, reassessments and prior notes for documentation efficiency.    Note: If patient does not return for scheduled/recommended follow up visits, this note will serve as a discharge from care along with the most recent update on progress.    Ortho Evaluation

## 2025-01-14 ENCOUNTER — APPOINTMENT (OUTPATIENT)
Dept: PHYSICAL THERAPY | Age: 55
End: 2025-01-14
Attending: INTERNAL MEDICINE
Payer: COMMERCIAL

## 2025-01-17 ENCOUNTER — APPOINTMENT (OUTPATIENT)
Dept: PHYSICAL THERAPY | Age: 55
End: 2025-01-17
Attending: INTERNAL MEDICINE
Payer: COMMERCIAL

## 2025-01-21 ENCOUNTER — HOSPITAL ENCOUNTER (OUTPATIENT)
Dept: PHYSICAL THERAPY | Age: 55
Setting detail: THERAPIES SERIES
Discharge: HOME | End: 2025-01-21
Attending: INTERNAL MEDICINE
Payer: COMMERCIAL

## 2025-01-21 PROCEDURE — 97110 THERAPEUTIC EXERCISES: CPT

## 2025-01-21 PROCEDURE — 97112 NEUROMUSCULAR REEDUCATION: CPT

## 2025-01-21 NOTE — FLOWSHEET NOTE
[facility] - Outpatient Rehabilitation and Therapy [address], [place] [address] office: [phone] fax: [phone]    Physical Therapy: TREATMENT/PROGRESS NOTE   Patient: Stefania Cross (55 y.o. female)   Examination Date: 2025   :  1970 MRN: [id number]   Visit #:   Insurance Allowable Auth Needed   BCBS - OOP Met, BMN, no auth  []Yes    [x]No    Insurance[place] / [place] / Product Type: *No Product type* /   Insurance ID: [id number] - ([place] [employer]BS)  Secondary Insurance (if applicable):    Treatment Diagnosis:     ICD-10-CM    1. Impaired strength of shoulder muscles  R29.898       2. Postural imbalance  R29.3       3. Stiffness of cervical spine  M43.6       4. Decreased ROM of left shoulder  M25.612          Medical Diagnosis:  Acute pain of left shoulder [M25.512]   Referring Physician: Ermelinda Sanchez DO  PCP: Ermelinda Sanchez DO     Plan of care signed (Y/N):     Date of Patient follow up with Physician:      Plan of Care Report: NO  POC update due: (10 visits /OR AUTH LIMITS, whichever is less)  2025                                            Medical History:  Comorbidities:    Relevant Medical History: IBS, hx of migrains, asthma, allergies                                         Precautions/ Contra-indications:           Latex allergy:  NO  Pacemaker:    NO  Contraindications for Manipulation: None  Date of Surgery: NA  Other:    Red Flags:  None    Suicide Screening:   The patient did not verbalize a primary behavioral concern, suicidal ideation, suicidal intent, or demonstrate suicidal behaviors.    Preferred Language for Healthcare:   [x] English       [] other:    SUBJECTIVE EXAMINATION     Patient stated complaint:  Doing well. Got her Tens unit and has been using. States her shoulder has been doing pretty good but hasn't been doing her exercises. Worked out last night and is having some discomfort. Has been sleeping

## 2025-01-28 ENCOUNTER — HOSPITAL ENCOUNTER (OUTPATIENT)
Dept: PHYSICAL THERAPY | Age: 55
Setting detail: THERAPIES SERIES
End: 2025-01-28
Attending: INTERNAL MEDICINE
Payer: COMMERCIAL

## 2025-02-05 ENCOUNTER — OFFICE VISIT (OUTPATIENT)
Dept: ORTHOPEDIC SURGERY | Age: 55
End: 2025-02-05
Payer: COMMERCIAL

## 2025-02-05 VITALS — HEIGHT: 65 IN | WEIGHT: 139 LBS | BODY MASS INDEX: 23.16 KG/M2 | RESPIRATION RATE: 16 BRPM

## 2025-02-05 DIAGNOSIS — M75.82 ROTATOR CUFF TENDINITIS, LEFT: Primary | ICD-10-CM

## 2025-02-05 PROCEDURE — 99213 OFFICE O/P EST LOW 20 MIN: CPT | Performed by: PHYSICIAN ASSISTANT

## 2025-02-05 RX ORDER — METHYLPREDNISOLONE 4 MG/1
TABLET ORAL
Qty: 1 KIT | Refills: 0 | Status: SHIPPED | OUTPATIENT
Start: 2025-02-05 | End: 2025-02-11

## 2025-02-05 NOTE — PROGRESS NOTES
glenohumeral articular height is preserved, there are no loose bodies appreciated.  The humeral head is slightly elevated, with a minimally reduced acromiohumeral distance.  On axillary view, the humeral head is well-centered within the glenoid.  The acromioclavicular joint demonstrates mild degenerative changes.  No acute fractures or dislocations are seen.      Labs:  No results for input(s): \"WBC\", \"HGB\", \"HCT\", \"MCV\", \"PLT\" in the last 720 hours.  Lab Results   Component Value Date     02/16/2024    K 4.0 02/16/2024     02/16/2024    CO2 30 02/16/2024    BUN 15 02/16/2024    CREATININE 0.9 02/16/2024    GLUCOSE 108 (H) 02/16/2024    CALCIUM 9.2 02/16/2024    BILITOT 0.4 02/16/2024    ALKPHOS 90 02/16/2024    AST 24 02/16/2024    ALT 28 02/16/2024    LABGLOM >60 02/16/2024    GFRAA >60 09/25/2020    AGRATIO 2.1 02/16/2024    GLOB 2.1 09/25/2020     No results found for: \"INR\"  No results found for: \"APTT\"  Lab Results   Component Value Date    LABA1C 5.6 03/01/2024    LABA1C 6.1 (H) 02/12/2010     Lab Results   Component Value Date    VITD25 41.7 02/16/2024          Assessment & Plan:  55 y.o. female who presents with    Diagnosis Orders   1. Rotator cuff tendinitis, left  methylPREDNISolone (MEDROL, ESTEFANIA,) 4 MG tablet            No orders of the defined types were placed in this encounter.      Shoulder symptoms improving, able to perform ADLs with less issues now  However, still having moderate pain after prolonged use and difficulty sleeping at night  Preserved strength and ROM, mildly painful with rotator cuff testing    Recommend continuing physical therapy  I have also prescribed her a Medrol Dosepak to help with her pain.  Continue Tylenol, ice, gentle heat as needed for pain    Patient was told to call our office if no pain relief with the Medrol Dosepak, and we will order an MRI to further assess.    Otherwise, follow-up in 2 months for progress check.      Jacki Reyes,

## 2025-02-11 ENCOUNTER — HOSPITAL ENCOUNTER (OUTPATIENT)
Dept: PHYSICAL THERAPY | Age: 55
Setting detail: THERAPIES SERIES
Discharge: HOME | End: 2025-02-11
Attending: INTERNAL MEDICINE
Payer: COMMERCIAL

## 2025-02-11 PROCEDURE — 97530 THERAPEUTIC ACTIVITIES: CPT

## 2025-02-11 NOTE — PLAN OF CARE
end range 180 deg        ER -0          ER -90 (90) 85 deg slight posterior shoulder pain 90 deg        IR -0          IR -90 (70) 80 deg  75 deg         ELBOW Flex/biceps (140)          Ext/triceps (0)          Pronation (80)          Supination (80)             WRIST Flexion (60)          Extension (60)          RD (20)          UD (20)                         MMT L MMT R Notes     CERVICAL Cerv flexion       Cerv extension       Cerv SB       Cerv rotation          SHOULDER Flexion 3+  4-     Abduction 3+ P 4-     ER -0       ER -90 4- pain 4-     IR -0       IR -90 4- P 4+      ELBOW Flex/biceps 5 pain in UT 5     Ext/triceps       Pronation       supination          WRIST Flexion       Extension       RD       UD               11/26/24  ROM/Strength: (Blank cells denote NT)    Mvmt (norm) AROM L AROM R Notes PROM L PROM R Notes     CERVICAL Flex (60) 45 deg       Ext (70) 45 deg       SB(45) 45 deg 45 deg        Rotation (80) 70 deg Pain on L 75 deg           SHOULDER Flexion (180) 153 deg (pain starts at 140 deg) 180 deg        Abduction (180) 150 deg pain in posterior shoulder (pain started at 90 deg) 180 deg        ER -0          ER -90 (90) 90 deg slight posterior shoulder pain 90 deg        IR -0          IR -90 (70) 70 deg no pain  75 deg         ELBOW Flex/biceps (140)          Ext/triceps (0)          Pronation (80)          Supination (80)             WRIST Flexion (60)          Extension (60)          RD (20)          UD (20)                         MMT L MMT R Notes     CERVICAL Cerv flexion       Cerv extension       Cerv SB       Cerv rotation          SHOULDER Flexion 3+ P 4-     Abduction 3+ P 4-     ER -0       ER -90 3+ no pain 4-     IR -0       IR -90 3+ P 4+      ELBOW Flex/biceps 5 pain in UT 5     Ext/triceps       Pronation       supination          WRIST Flexion       Extension       RD       UD                 Palpation:   Patient reported tenderness with palpation

## 2025-02-18 ENCOUNTER — APPOINTMENT (OUTPATIENT)
Dept: PHYSICAL THERAPY | Age: 55
End: 2025-02-18
Attending: INTERNAL MEDICINE
Payer: COMMERCIAL

## 2025-02-25 ENCOUNTER — HOSPITAL ENCOUNTER (OUTPATIENT)
Dept: PHYSICAL THERAPY | Age: 55
Setting detail: THERAPIES SERIES
Discharge: HOME | End: 2025-02-25
Attending: INTERNAL MEDICINE
Payer: COMMERCIAL

## 2025-02-25 PROCEDURE — 97140 MANUAL THERAPY 1/> REGIONS: CPT

## 2025-02-25 PROCEDURE — 97112 NEUROMUSCULAR REEDUCATION: CPT

## 2025-02-25 NOTE — FLOWSHEET NOTE
[facility] - Outpatient Rehabilitation and Therapy [address], [place] [address] office: [phone] fax: [phone]    Physical Therapy: TREATMENT/PROGRESS NOTE   Patient: Stefania Cross (55 y.o. female)   Examination Date: 2025   :  1970 MRN: [id number]   Visit #:   Insurance Allowable Auth Needed   BCBS - OOP Met, BMN, no auth  []Yes    [x]No    Ins[place] / Product Type: *No Product type* /   Insurance ID: [id number] - ([place] [employer]BS)  Secondary Insurance (if applicable):    Treatment Diagnosis:     ICD-10-CM    1. Impaired strength of shoulder muscles  R29.898       2. Postural imbalance  R29.3       3. Stiffness of cervical spine  M43.6       4. Decreased ROM of left shoulder  M25.612          Medical Diagnosis:  Acute pain of left shoulder [M25.512]   Referring Physician: Ermelinda Sanchez DO  PCP: Ermelinda Sanchez DO     Plan of care signed (Y/N):     Date of Patient follow up with Physician:      Plan of Care Report: NO  POC update due: (10 visits /OR AUTH LIMITS, whichever is less)  3/11/2025                                            Medical History:  Comorbidities:    Relevant Medical History: IBS, hx of migrains, asthma, allergies                                         Precautions/ Contra-indications:           Latex allergy:  NO  Pacemaker:    NO  Contraindications for Manipulation: None  Date of Surgery: NA  Other:    Red Flags:  None    Suicide Screening:   The patient did not verbalize a primary behavioral concern, suicidal ideation, suicidal intent, or demonstrate suicidal behaviors.    Preferred Language for Healthcare:   [x] English       [] other:    SUBJECTIVE EXAMINATION     Patient stated complaint: states she is doing better. Can sleep on her L arm a little more. Not having naggy pain as much. Was able to go to [employer]. Wants new exercises. Hasn't had to use TENs as much as well.       Eval: onset 6

## 2025-03-11 ENCOUNTER — HOSPITAL ENCOUNTER (OUTPATIENT)
Dept: PHYSICAL THERAPY | Age: 55
Setting detail: THERAPIES SERIES
Discharge: HOME | End: 2025-03-11
Attending: INTERNAL MEDICINE

## 2025-03-11 NOTE — FLOWSHEET NOTE
AROM of LUE to 180 degrees without pain to allow for proper joint functioning to enable patient to reach above head without discomfort.   [] Progressing: [x] Met: [] Not Met: [] Adjusted  3. Patient will demonstrate increased Strength of BUE to at least 4+/5 throughout without pain to allow for proper functional mobility to enable patient to return to lifting, return to gym based activities.   [x] Progressing: [] Met: [] Not Met: [] Adjusted  4. Patient will return to sleeping on L side or R side and back for up to 6 hours without increased symptoms or restriction.   [x] Progressing: [] Met: [] Not Met: [] Adjusted  5. Pt will participate in gym based activities to perform [employer] workouts including push up, planks, and take impact in BUE for up to 30 minutes without discomfort to demonstrate improvements in return to recreational activities.   [x] Progressing: [] Met: [] Not Met: [] Adjusted         Overall Progression Towards Functional goals/ Treatment Progress Update:  [x] Patient is progressing as expected towards functional goals listed.    [] Progression is slowed due to complexities/Impairments listed.  [] Progression has been slowed due to co-morbidities.  [] Plan just implemented, too soon (<30days) to assess goals progression   [] Goals require adjustment due to lack of progress  [] Patient is not progressing as expected and requires additional follow up with physician  [] Other:     TREATMENT PLAN     Frequency/Duration: 2x/week for 8 weeks for the following treatment interventions:    Interventions:  Therapeutic Exercise (67242) including: strength training, ROM, and functional mobility  Therapeutic Activities (43376) including: functional mobility training and education.  Neuromuscular Re-education (55641) activation and proprioception, including postural re-education.    Manual Therapy (85382) as indicated to include: Passive Range of Motion, Gr I-IV mobilizations, Dry Needling/IASTM, Trigger Point

## 2025-03-13 ENCOUNTER — TELEPHONE (OUTPATIENT)
Dept: INTERNAL MEDICINE CLINIC | Age: 55
End: 2025-03-13

## 2025-03-13 ENCOUNTER — OFFICE VISIT (OUTPATIENT)
Dept: INTERNAL MEDICINE CLINIC | Age: 55
End: 2025-03-13

## 2025-03-13 VITALS — SYSTOLIC BLOOD PRESSURE: 120 MMHG | OXYGEN SATURATION: 97 % | HEART RATE: 98 BPM | DIASTOLIC BLOOD PRESSURE: 84 MMHG

## 2025-03-13 DIAGNOSIS — R20.0 NUMBNESS AND TINGLING: ICD-10-CM

## 2025-03-13 DIAGNOSIS — M62.838 CERVICAL PARASPINAL MUSCLE SPASM: Primary | ICD-10-CM

## 2025-03-13 DIAGNOSIS — M54.2 NECK PAIN ON RIGHT SIDE: ICD-10-CM

## 2025-03-13 DIAGNOSIS — R20.2 NUMBNESS AND TINGLING: ICD-10-CM

## 2025-03-13 RX ORDER — METHYLPREDNISOLONE 4 MG/1
TABLET ORAL
Qty: 1 KIT | Refills: 0 | Status: SHIPPED | OUTPATIENT
Start: 2025-03-13 | End: 2025-03-19

## 2025-03-13 SDOH — ECONOMIC STABILITY: FOOD INSECURITY: WITHIN THE PAST 12 MONTHS, YOU WORRIED THAT YOUR FOOD WOULD RUN OUT BEFORE YOU GOT MONEY TO BUY MORE.: NEVER TRUE

## 2025-03-13 SDOH — ECONOMIC STABILITY: FOOD INSECURITY: WITHIN THE PAST 12 MONTHS, THE FOOD YOU BOUGHT JUST DIDN'T LAST AND YOU DIDN'T HAVE MONEY TO GET MORE.: NEVER TRUE

## 2025-03-13 ASSESSMENT — PATIENT HEALTH QUESTIONNAIRE - PHQ9
SUM OF ALL RESPONSES TO PHQ QUESTIONS 1-9: 0
2. FEELING DOWN, DEPRESSED OR HOPELESS: NOT AT ALL
SUM OF ALL RESPONSES TO PHQ QUESTIONS 1-9: 0
1. LITTLE INTEREST OR PLEASURE IN DOING THINGS: NOT AT ALL
SUM OF ALL RESPONSES TO PHQ QUESTIONS 1-9: 0
SUM OF ALL RESPONSES TO PHQ QUESTIONS 1-9: 0

## 2025-03-13 NOTE — TELEPHONE ENCOUNTER
Patient was here for appt with Alem, per follow up notes she is to return in 1 wk for f/u on numbness with  if symptoms doesn't get better. Nothing is available with  within a week.   Patient stated she will message  on [email or web address] about getting an appt if her symptoms does not get better.

## 2025-03-13 NOTE — PROGRESS NOTES
present. Decreased range of motion.      Comments: Negative tinels or phalens   Neurological:      Mental Status: She is alert and oriented to person, place, and time.      Motor: No weakness.      Coordination: Coordination normal.      Gait: Gait normal.   Psychiatric:         Mood and Affect: Mood normal.         Behavior: Behavior normal.   All questions answered. Patient stated no further questions or concerns at this time.     Electronically signed by LARA Allen NP on 3/13/2025 at 8:50 AM.

## 2025-04-04 DIAGNOSIS — M54.2 NECK PAIN ON RIGHT SIDE: ICD-10-CM

## 2025-04-04 DIAGNOSIS — M62.838 CERVICAL PARASPINAL MUSCLE SPASM: ICD-10-CM

## 2025-04-08 ENCOUNTER — OFFICE VISIT (OUTPATIENT)
Dept: INTERNAL MEDICINE CLINIC | Age: 55
End: 2025-04-08

## 2025-04-08 VITALS
HEIGHT: 65 IN | HEART RATE: 84 BPM | SYSTOLIC BLOOD PRESSURE: 122 MMHG | DIASTOLIC BLOOD PRESSURE: 84 MMHG | BODY MASS INDEX: 23.13 KG/M2 | OXYGEN SATURATION: 95 %

## 2025-04-08 DIAGNOSIS — G89.29 CHRONIC PAIN OF RIGHT THUMB: ICD-10-CM

## 2025-04-08 DIAGNOSIS — R31.9 HEMATURIA, UNSPECIFIED TYPE: Primary | ICD-10-CM

## 2025-04-08 DIAGNOSIS — Z00.00 WELL ADULT EXAM: ICD-10-CM

## 2025-04-08 DIAGNOSIS — Z87.442 HISTORY OF KIDNEY STONES: ICD-10-CM

## 2025-04-08 DIAGNOSIS — G89.29 CHRONIC PAIN OF LEFT THUMB: ICD-10-CM

## 2025-04-08 DIAGNOSIS — Z12.11 COLON CANCER SCREENING: ICD-10-CM

## 2025-04-08 DIAGNOSIS — E55.9 VITAMIN D INSUFFICIENCY: ICD-10-CM

## 2025-04-08 DIAGNOSIS — G43.109 MIGRAINE WITH AURA AND WITHOUT STATUS MIGRAINOSUS, NOT INTRACTABLE: ICD-10-CM

## 2025-04-08 DIAGNOSIS — M79.644 CHRONIC PAIN OF RIGHT THUMB: ICD-10-CM

## 2025-04-08 DIAGNOSIS — M79.645 CHRONIC PAIN OF LEFT THUMB: ICD-10-CM

## 2025-04-08 DIAGNOSIS — M54.2 NECK PAIN: ICD-10-CM

## 2025-04-08 LAB
BILIRUBIN, POC: NORMAL
BLOOD URINE, POC: NORMAL
CLARITY, POC: NORMAL
COLOR, POC: NORMAL
GLUCOSE URINE, POC: NEGATIVE MG/DL
KETONES, POC: 15 MG/DL
LEUKOCYTE EST, POC: NORMAL
NITRITE, POC: NEGATIVE
PH, POC: 5.5
PROTEIN, POC: 300 MG/DL
SPECIFIC GRAVITY, POC: 1.02
UROBILINOGEN, POC: 1 MG/DL

## 2025-04-08 RX ORDER — SULFAMETHOXAZOLE AND TRIMETHOPRIM 800; 160 MG/1; MG/1
1 TABLET ORAL 2 TIMES DAILY
Qty: 6 TABLET | Refills: 0 | Status: SHIPPED | OUTPATIENT
Start: 2025-04-08 | End: 2025-04-11

## 2025-04-08 RX ORDER — HYDROCODONE BITARTRATE AND ACETAMINOPHEN 5; 325 MG/1; MG/1
1 TABLET ORAL EVERY 8 HOURS PRN
Qty: 20 TABLET | Refills: 0 | Status: SHIPPED | OUTPATIENT
Start: 2025-04-08 | End: 2025-04-15

## 2025-04-08 NOTE — PROGRESS NOTES
5-325 MG per tablet Take 1 tablet by mouth every 8 hours as needed for Pain for up to 7 days. Intended supply: 7 days. Take lowest dose possible to manage pain Max Daily Amount: 3 tablets 20 tablet 0    tiZANidine (ZANAFLEX) 4 MG tablet TAKE 1 TABLET BY MOUTH EVERY EVENING AS NEEDED 30 tablet 0    verapamil (CALAN SR) 120 MG extended release tablet TAKE 1 TABLET BY MOUTH EVERY DAY IN THE EVENING 90 tablet 1    venlafaxine (EFFEXOR XR) 75 MG extended release capsule TAKE 1 CAPSULE BY MOUTH EVERY DAY 90 capsule 1    Fremanezumab-vfrm (AJOVY) 225 MG/1.5ML SOAJ INJECT 1.5 ML SUBCUTANEOUSLY EVERY 30 DAYS 3 Adjustable Dose Pre-filled Pen Syringe 5    rimegepant sulfate (NURTEC) 75 MG TBDP Take 1 tablet at the onset of the severe headache.  Not to exceed 1 tablet in 48 hours. 8 tablet 1    albuterol sulfate  (90 Base) MCG/ACT inhaler TAKE 2 PUFFS BY MOUTH EVERY 6 HOURS AS NEEDED FOR WHEEZE 6.7 each 1    levocetirizine (XYZAL) 5 MG tablet Take 1 tablet by mouth nightly      fluticasone (VERAMYST) 27.5 MCG/SPRAY nasal spray by Nasal route every morning      ibuprofen (ADVIL;MOTRIN) 200 MG tablet Take 1 tablet by mouth every 6 hours as needed for Pain       No current facility-administered medications for this visit.       Return in about 6 months (around 10/8/2025) for wellness.

## 2025-04-09 LAB — BACTERIA UR CULT: NORMAL

## 2025-04-10 ENCOUNTER — RESULTS FOLLOW-UP (OUTPATIENT)
Dept: INTERNAL MEDICINE CLINIC | Age: 55
End: 2025-04-10

## 2025-04-10 PROBLEM — M79.644 CHRONIC PAIN OF RIGHT THUMB: Status: ACTIVE | Noted: 2025-04-10

## 2025-04-10 PROBLEM — G89.29 CHRONIC PAIN OF RIGHT THUMB: Status: ACTIVE | Noted: 2025-04-10

## 2025-04-10 PROBLEM — M62.838 CERVICAL PARASPINAL MUSCLE SPASM: Status: RESOLVED | Noted: 2025-03-13 | Resolved: 2025-04-10

## 2025-04-10 PROBLEM — R31.9 HEMATURIA: Status: ACTIVE | Noted: 2025-04-10

## 2025-04-10 PROBLEM — M25.512 ACUTE PAIN OF LEFT SHOULDER: Status: RESOLVED | Noted: 2024-11-16 | Resolved: 2025-04-10

## 2025-04-11 NOTE — ASSESSMENT & PLAN NOTE
X-ray of left hand did show mild arthritis.  Referral placed to occupational therapy.  
 Has history of kidney stones.  Current symptoms do not feel like kidney stones.Follows with Dr. Tory gutiérrez.  Has required lithotripsy ×2.  Currently having lower abdominal cramping and hematuria.  Check KUB if symptoms persist.  May need to return to urology.  
 Referral placed to physical therapy.  
 Taking over-the-counter vitamin D3.  Check vitamin D level.  
 X-ray of left hand did show mild arthritis.  Symptoms in right thumb are the same.  Referral placed to occupational therapy.  
Follows with Dr. Tory Arroyo pshaun.  Has required lithotripsy ×2.  Currently having lower abdominal cramping and hematuria.  Check KUB if symptoms persist.  May need to return to urology.  
She continues to follow with Dr. Roa for neurology. In general, doing very well on verapamil  mg daily and monthly Ajovy with Nurtec as needed.  Has recently had an increase in migraine frequency over the past few weeks.  She is also having back pain.  Referral has been placed to physical therapy.  
03-Mar-2021 12:15

## 2025-04-14 ENCOUNTER — HOSPITAL ENCOUNTER (OUTPATIENT)
Dept: PHYSICAL THERAPY | Age: 55
Setting detail: THERAPIES SERIES
Discharge: HOME | End: 2025-04-14
Attending: INTERNAL MEDICINE
Payer: COMMERCIAL

## 2025-04-14 DIAGNOSIS — M54.2 CERVICALGIA: Primary | ICD-10-CM

## 2025-04-14 PROCEDURE — 97140 MANUAL THERAPY 1/> REGIONS: CPT

## 2025-04-14 PROCEDURE — 97161 PT EVAL LOW COMPLEX 20 MIN: CPT

## 2025-04-14 NOTE — PLAN OF CARE
[facility] - [facility] and Therapy: [address], [place] [address] office: [phone] fax: [phone]     Physical Therapy Initial Evaluation Certification      Dear Ermelinda Sanchez DO ,    We had the pleasure of evaluating the following patient for physical therapy services at [facility].  A summary of our findings can be found in the initial assessment below.  This includes our plan of care.  If you have any questions or concerns regarding these findings, please do not hesitate to contact me at the office phone number listed above.  Thank you for the referral.     Physician Signature:_______________________________Date:__________________  By signing above (or electronic signature), therapist’s plan is approved by physician     Physical Therapy: TREATMENT/PROGRESS NOTE   Patient: Stefania Cross (55 y.o. female)   Examination Date: 2025   :  1970 MRN: [id number]   Visit #: 1   Insurance Allowable Auth Needed   PMN []Yes    [x]No    [place] / Product Type: *No Product type* /   Insurance ID: [id number] - ([place] [employer])  Secondary Insurance (if applicable):    Treatment Diagnosis:     ICD-10-CM    1. Cervicalgia  M54.2          Medical Diagnosis:  Neck ache [M54.2]   Referring Physician: Ermelinda Sanchez DO  PCP: Ermelinda Sanchez DO     Plan of care signed (Y/N):     Date of Patient follow up with Physician:      Plan of Care Report: EVAL today  POC update due: (10 visits /OR AUTH LIMITS, whichever is less): 2025                                             Medical History:  Relevant Medical History: asthma, migraines                                         Precautions/ Contra-indications:           Latex allergy:  NO  Pacemaker:    NO  Contraindications for Manipulation: None  Date of Surgery:   Other:    Red Flags:  None    Suicide Screening:   The patient did not verbalize a

## 2025-04-23 ENCOUNTER — APPOINTMENT (OUTPATIENT)
Dept: PHYSICAL THERAPY | Age: 55
End: 2025-04-23
Attending: INTERNAL MEDICINE
Payer: COMMERCIAL

## 2025-04-28 ENCOUNTER — TELEPHONE (OUTPATIENT)
Dept: NEUROLOGY | Age: 55
End: 2025-04-28

## 2025-04-28 ENCOUNTER — HOSPITAL ENCOUNTER (OUTPATIENT)
Dept: PHYSICAL THERAPY | Age: 55
Setting detail: THERAPIES SERIES
End: 2025-04-28
Attending: INTERNAL MEDICINE
Payer: COMMERCIAL

## 2025-04-28 DIAGNOSIS — G43.109 MIGRAINE WITH AURA AND WITHOUT STATUS MIGRAINOSUS, NOT INTRACTABLE: ICD-10-CM

## 2025-04-28 RX ORDER — FREMANEZUMAB-VFRM 225 MG/1.5ML
INJECTION SUBCUTANEOUS
Qty: 3 ADJUSTABLE DOSE PRE-FILLED PEN SYRINGE | Refills: 1 | Status: SHIPPED | OUTPATIENT
Start: 2025-04-28

## 2025-05-07 ENCOUNTER — HOSPITAL ENCOUNTER (OUTPATIENT)
Dept: PHYSICAL THERAPY | Age: 55
Setting detail: THERAPIES SERIES
Discharge: HOME | End: 2025-05-07
Attending: INTERNAL MEDICINE
Payer: COMMERCIAL

## 2025-05-07 PROCEDURE — 97140 MANUAL THERAPY 1/> REGIONS: CPT

## 2025-05-07 PROCEDURE — 97110 THERAPEUTIC EXERCISES: CPT

## 2025-05-07 NOTE — FLOWSHEET NOTE
[facility] - Outpatient Rehabilitation and Therapy: [address], [place] [address] office: [phone] fax: [phone]     Physical Therapy: TREATMENT/PROGRESS NOTE   Patient: Stefania Cross (55 y.o. female)   Examination Date: 2025   :  1970 MRN: [id number]   Visit #: 2   Insurance Allowable Auth Needed   PMN []Yes    [x]No    Ins[place]: [place] / Product Type: *No Product type* /   Insurance ID: [id number] - ([place] [employer])  Secondary Insurance (if applicable):    Treatment Diagnosis:       ICD-10-CM     1. Cervicalgia  M54.2         Medical Diagnosis:  Neck ache [M54.2]   Referring Physician: Ermelinda Sanchez DO  PCP: Ermelinda Sanchez DO     Plan of care signed (Y/N): Y    Date of Patient follow up with Physician:      Plan of Care Report: NO  POC update due: (10 visits /OR AUTH LIMITS, whichever is less): 2025                                             Medical History:  Relevant Medical History: asthma, migraines                                         Precautions/ Contra-indications:           Latex allergy:  NO  Pacemaker:    NO  Contraindications for Manipulation: None  Date of Surgery:   Other:    Red Flags:  None    Suicide Screening:   The patient did not verbalize a primary behavioral concern, suicidal ideation, suicidal intent, or demonstrate suicidal behaviors.    Preferred Language for Healthcare:   [x] English       [] other:    SUBJECTIVE EXAMINATION     Patient stated complaint: Slipped and fell down a few stairs on Friday, and some mid-low back pain has increased since. She has been icing and heating and using the salonpas patches, as well as using advil. Her neck pain improved since eval. No headaches since last PT visit either.     Neck pain is improved since PT MSK screen. The retraction and cervical ROM exercises she has been able to do successfully. She did get a massage on Saturday and feels like it could have

## 2025-05-14 ENCOUNTER — APPOINTMENT (OUTPATIENT)
Dept: PHYSICAL THERAPY | Age: 55
End: 2025-05-14
Attending: INTERNAL MEDICINE
Payer: COMMERCIAL

## 2025-05-28 ENCOUNTER — OFFICE VISIT (OUTPATIENT)
Dept: NEUROLOGY | Age: 55
End: 2025-05-28
Payer: COMMERCIAL

## 2025-05-28 VITALS
HEART RATE: 85 BPM | BODY MASS INDEX: 24.83 KG/M2 | SYSTOLIC BLOOD PRESSURE: 138 MMHG | HEIGHT: 65 IN | WEIGHT: 149 LBS | DIASTOLIC BLOOD PRESSURE: 96 MMHG

## 2025-05-28 DIAGNOSIS — G43.109 MIGRAINE WITH AURA AND WITHOUT STATUS MIGRAINOSUS, NOT INTRACTABLE: Primary | ICD-10-CM

## 2025-05-28 PROCEDURE — 99213 OFFICE O/P EST LOW 20 MIN: CPT | Performed by: PSYCHIATRY & NEUROLOGY

## 2025-05-28 RX ORDER — FREMANEZUMAB-VFRM 225 MG/1.5ML
INJECTION SUBCUTANEOUS
Qty: 3 ADJUSTABLE DOSE PRE-FILLED PEN SYRINGE | Refills: 5 | Status: SHIPPED | OUTPATIENT
Start: 2025-05-28

## 2025-05-28 NOTE — PATIENT INSTRUCTIONS

## 2025-05-28 NOTE — PROGRESS NOTES
The patient came today for follow up regarding: Migraine headaches      Interval history:        The patient feels the same per last visit.  She is on the same dose of Ajovy injection.  Migraines are sporadic.  Once every few weeks.  She only had to take 1 or 2 neurochecks since her last visit.  No more daily headache.  No side effect from Ajovy.  She is still on verapamil to 120 Mg daily.  Other review of system was unremarkable.      Background history:    Patient with history of migraine type headaches.  Patient initially started seeing Dr. Covarrubias in 2019.  She reports symptoms started when she was young and elementary school.  Her headaches improved but prior to seeing Dr. Covarrubias in 2019, she reports, over the last few years, they have come back and they seem to be worse than prior headaches.  She would get headaches once weekly.  They can last several hours.  Description: Throbbing in nature, sometimes with scintillating scotoma with severe headaches.  Aggravating factors include bright lights and loud noises which can make her headache worse.  Patient reports strong family history of headaches on maternal side  Patient currently on Ajovy every 30 days.  Since starting Ajovy she reports headaches are controlled, approximately 1 headache monthly.  Patient also is on Nurtec as needed for breakthrough headaches.  She is also taking verapamil. She does get frequent dull headaches.  Patient also has neck pain and is taking amitriptyline.    Previously has tried topiramate but stopped due to kidney stones.  Also, patient did not tolerate propranolol.       Exam:   Constitutional:   Vitals:    05/28/25 1516 05/28/25 1520   BP: (!) 137/97 (!) 138/96   Pulse: 87 85   Weight: 67.6 kg (149 lb)    Height: 1.651 m (5' 5\")        General appearance:  Normal development and appear in no acute distress.   Mental Status:   Oriented to person, place, problem, and time.    Memory: Good immediate recall.  Intact remote

## 2025-06-02 ENCOUNTER — OFFICE VISIT (OUTPATIENT)
Dept: INTERNAL MEDICINE CLINIC | Age: 55
End: 2025-06-02

## 2025-06-02 ENCOUNTER — TELEPHONE (OUTPATIENT)
Dept: NEUROLOGY | Age: 55
End: 2025-06-02

## 2025-06-02 VITALS
BODY MASS INDEX: 24.79 KG/M2 | WEIGHT: 148.8 LBS | HEART RATE: 91 BPM | SYSTOLIC BLOOD PRESSURE: 140 MMHG | DIASTOLIC BLOOD PRESSURE: 98 MMHG | OXYGEN SATURATION: 96 % | HEIGHT: 65 IN

## 2025-06-02 DIAGNOSIS — G43.109 MIGRAINE WITH AURA AND WITHOUT STATUS MIGRAINOSUS, NOT INTRACTABLE: Primary | ICD-10-CM

## 2025-06-02 DIAGNOSIS — R03.0 ELEVATED BLOOD-PRESSURE READING WITHOUT DIAGNOSIS OF HYPERTENSION: ICD-10-CM

## 2025-06-02 DIAGNOSIS — H61.21 IMPACTED CERUMEN OF RIGHT EAR: ICD-10-CM

## 2025-06-02 RX ORDER — ONDANSETRON 4 MG/1
4 TABLET, ORALLY DISINTEGRATING ORAL 3 TIMES DAILY PRN
Qty: 21 TABLET | Refills: 0 | Status: SHIPPED | OUTPATIENT
Start: 2025-06-02

## 2025-06-02 ASSESSMENT — ENCOUNTER SYMPTOMS: SHORTNESS OF BREATH: 0

## 2025-06-02 NOTE — TELEPHONE ENCOUNTER
Per Dr Mcmullen, pt needs to monitor her BP/HR. Pt states she doesn't have a BP machine. She will follow up with pcp or urgent care for monitoring. Pt may have a sensitivity to decreasing verapamil. After r/o other causes pt may resume verapamil daily.

## 2025-06-02 NOTE — TELEPHONE ENCOUNTER
Pt reports feeling off, nauseated, off balance feels like she's swaying since Friday morning. Woke up Friday at 1am with migraine, took nurtec, went back to sleep. Later that day, left work d/t confusion, extremely nauseated and was down for the weekend. Feels like eyeballs are swollen and feeling weird.    noticed she is not walking straight  Admits confusion, hard time processing what people are saying  Denies numbness, tingling,   Verapamil started qod last week.

## 2025-06-02 NOTE — PROGRESS NOTES
Office Visit   6/2/2025    Assessment and Plan:  1. Migraine with aura and without status migrainosus, not intractable  -     ondansetron (ZOFRAN-ODT) 4 MG disintegrating tablet; Take 1 tablet by mouth 3 times daily as needed for Nausea or Vomiting, Disp-21 tablet, R-0Normal  2. Impacted cerumen of right ear  -     REMOVAL IMPACTED CERUMEN IRRIGATION/LVG UNILAT  3. Elevated blood-pressure reading without diagnosis of hypertension  -     TSH reflex to FT4; Future     Assessment & Plan  1. Migraines.  - Experiencing migraines, potentially related to elevated blood pressure.  - Advised to resume daily verapamil  mg and monitor blood pressure to determine correlation with symptoms for 5 days  - Prescription for Zofran will be sent to pharmacy for nausea. Complete fasting labs, including vitamin D, CBC, CMP, lipids, and TSH, that were previously ordered in [facility].  - Follows with Neurology.  - If no correlation between symptoms and blood pressure, consult with neurologist or Dr. Sanchez.    2. Elevated blood pressure.  - Blood pressure readings elevated, with measurements such [facility] and 142/95 at home.  - Advised to resume daily verapamil  mg and monitor blood pressure daily for 5 days.  - If blood pressure remains elevated, further adjustments to medication may be necessary.  - Blood pressure can contribute to feeling unwell, including symptoms like headaches.  -  Follow-up with regular provided in 2 to 3 weeks    3. Allergies.  - Reports experiencing itchy ears and sneezing due to allergies.  - Right Ear cleaned during the visit to see if it helps with symptoms.  - Currently using Flonase in the morning and Xyzal at night, and receives monthly allergy shots.  - No recent congestion reported, but allergies are managed with current regimen.    4.  Right ear cerumen impaction  -  irrigated right ear with elephant wash without difficulty. After irrigation, tympanic membranes was visualized and appeared to

## 2025-06-03 ENCOUNTER — TELEPHONE (OUTPATIENT)
Dept: INTERNAL MEDICINE CLINIC | Age: 55
End: 2025-06-03

## 2025-06-03 ENCOUNTER — OFFICE VISIT (OUTPATIENT)
Dept: INTERNAL MEDICINE CLINIC | Age: 55
End: 2025-06-03

## 2025-06-03 VITALS
OXYGEN SATURATION: 98 % | WEIGHT: 147.8 LBS | SYSTOLIC BLOOD PRESSURE: 124 MMHG | DIASTOLIC BLOOD PRESSURE: 98 MMHG | HEART RATE: 81 BPM | BODY MASS INDEX: 24.6 KG/M2

## 2025-06-03 DIAGNOSIS — R03.0 ELEVATED BLOOD-PRESSURE READING WITHOUT DIAGNOSIS OF HYPERTENSION: ICD-10-CM

## 2025-06-03 DIAGNOSIS — G43.109 MIGRAINE WITH AURA AND WITHOUT STATUS MIGRAINOSUS, NOT INTRACTABLE: Primary | ICD-10-CM

## 2025-06-03 NOTE — ASSESSMENT & PLAN NOTE
Discussed with patient that elevated blood pressure may represent either increased blood pressure due to increased pain related to her headaches or underlying hypertension that is being unmasked with weaning of verapamil 120 mg daily.  She is back on verapamil 120 mg daily, having taken a dose last night and will take 1 again tonight.  Patient is going to monitor blood pressure at home and will see me back in the office in about 6 to 8 weeks.

## 2025-06-03 NOTE — PROGRESS NOTES
Patient: Stefania Cross is a 55 y.o. female who presents today with the following Chief Complaint(s):  Chief Complaint   Patient presents with    Hypertension    Headache       HPI    Here today for acute appointment.     Woke up this morning with a migraine.   - started at 6:30 am.  - noticed that her BP was up after she woke up from migraine. 160/106. Felt shaky with BP. Down to 152/101 about 40 minutes later -> 130/93 about 2 hours later.     Has been doing very well with migraines so neurology told her to start weaning off of verapamil- taking QOD.   - [facility] and 140/87. Bought BP cuff- 142/95 and 139/88 last night. BP at bedtime was 119/78. Was feeling well when she went to bed.     Had migraine Friday. Trouble with word finding and feeling off balance.   - can be typical with migraines but has also had non-migraine headaches with some word finding and feeling off balance.     Just started weaning verapamil last week.   - went back to daily verapamil yesterday.     Last dose of Nurtec was today. Took Friday and Monday.     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     Allergies   Allergen Reactions    Zithromax [Azithromycin] Rash      Past Medical History:   Diagnosis Date    Allergic rhinitis     Asthma     Irritable bowel syndrome     Kidney stone     Migraine       Past Surgical History:   Procedure Laterality Date    HEMORRHOID SURGERY  2017    LITHOTRIPSY      x 2    OTHER SURGICAL HISTORY      anal fissure repair    PELVIC LAPAROSCOPY      endometriosis      Social History     Socioeconomic History    Marital status:      Spouse name: Not on file    Number of children: Not on file    Years of education: Not on file    Highest education level: Not on file   Occupational History    Not on file   Tobacco Use    Smoking status: Never    Smokeless tobacco: Never   Vaping Use    Vaping status: Never Used   Substance and Sexual Activity    Alcohol use: Yes

## 2025-06-03 NOTE — TELEPHONE ENCOUNTER
Can she please come in with her  today at 11:20 and I will see her as well. Bump VV patient to 12 please.

## 2025-06-03 NOTE — ASSESSMENT & PLAN NOTE
She continues to follow with Dr. Roa for neurology. In general, doing very well on verapamil  mg daily and monthly Ajovy with Nurtec as needed.  At her visit with Dr. Cruz 5/28/2025, he recommended weaning off of verapamil by decreasing verapamil SR to 120 mg q. OD.  Since Friday of last week (5/30/2025), she has had increased migraines with elevated blood pressure.  She is now back on daily verapamil  mg.  We did discuss trying to take Nurtec when she has episodes where she is having difficulty with word finding and feeling clumsy/off balance to see if that helps improve the symptoms with consideration that these are atypical migraine symptoms.  She is going to also start journaling with her headaches as well as her cognitive symptoms.

## 2025-06-03 NOTE — PATIENT INSTRUCTIONS
Please check you blood pressure 2-3 times per week at different times of the day. Please bring the readings and your blood pressure cuff with you to your next appointment. Goal blood pressure is under 135/85, ideal is in the 120's/80's and below.

## 2025-06-03 NOTE — TELEPHONE ENCOUNTER
Pt seen yesterday by Alem for migraines and elevated BP reading    Pt calling in reporting her BP readings this morning. /106 today @ 0838 and 10 min later rechecked with systolic 159  but she did not record the diastolic. She took her reading again while on the phone now and was 154/101 pulse 69. She felt a migraine coming back this morning which woke her up and pt took a nurtec at 0630, slept until around 8 am that's around the time she took her BP.  No SOB or chest pain, is having some visual blurriness like when she has to put on her glasses for reading, feels shaky.    Please advise, thanks.    Last night her BP was 119/77, pulse 85

## 2025-06-04 ENCOUNTER — RESULTS FOLLOW-UP (OUTPATIENT)
Dept: INTERNAL MEDICINE CLINIC | Age: 55
End: 2025-06-04

## 2025-06-04 LAB — TSH SERPL DL<=0.005 MIU/L-ACNC: 1.07 UIU/ML (ref 0.27–4.2)

## 2025-06-05 ENCOUNTER — TELEPHONE (OUTPATIENT)
Dept: INTERNAL MEDICINE CLINIC | Age: 55
End: 2025-06-05

## 2025-06-05 NOTE — TELEPHONE ENCOUNTER
Pt saw  6/3 for migraines and high Bps.    She is no longer having any headaches as of today but her Bps are still running high, last taken at 417 pm which was 151/95, pulse 73.     Her readings were high as well yesterday and provided some readings: 152/101, 155/100, 174/108, 118/91. Pulses have been in the range of 75-85.    She is still felling \"off and shaky.\" She denies any SOB, chest pain, blurry vision or any other symptoms.    She is just concerned that her numbers are still high and that she has had to miss work.     She is aware that  is out today and does not feel the need to speak to the covering provider. She was advised should her symptoms worsen or with any new symptoms to go to the ER.

## 2025-06-06 RX ORDER — VERAPAMIL HYDROCHLORIDE 180 MG/1
180 CAPSULE, DELAYED RELEASE ORAL NIGHTLY
Qty: 30 CAPSULE | Refills: 3 | Status: SHIPPED | OUTPATIENT
Start: 2025-06-06

## 2025-06-06 NOTE — TELEPHONE ENCOUNTER
I have increased verapamil to 180 mg- sent to [facility]. Please also have her come in to see me next week Wed 6/11 at 4:20 pm.

## 2025-06-10 ENCOUNTER — HOSPITAL ENCOUNTER (OUTPATIENT)
Dept: PHYSICAL THERAPY | Age: 55
Setting detail: THERAPIES SERIES
Discharge: HOME | End: 2025-06-10
Attending: INTERNAL MEDICINE
Payer: COMMERCIAL

## 2025-06-10 PROCEDURE — 97140 MANUAL THERAPY 1/> REGIONS: CPT

## 2025-06-10 PROCEDURE — 97110 THERAPEUTIC EXERCISES: CPT

## 2025-06-10 PROCEDURE — 97530 THERAPEUTIC ACTIVITIES: CPT

## 2025-06-10 NOTE — DISCHARGE SUMMARY
[facility] - Outpatient Rehabilitation and Therapy: [address], [place] [address] office: [phone] fax: [phone]    Physical Therapy Discharge Summary    Dear Ermelinda Sanchez DO   ,    We had the pleasure of treating the following patient for physical therapy services at [facility].  A summary of our findings can be found in the discharge summary below.  If you have any questions or concerns regarding these findings, please do not hesitate to contact me at the office phone number checked above.  Thank you for the referral.     Total Visits: 3     Recommendation:    [x] Discharge to [facility]. Follow up with PT or MD PRN.     Reason for Discharge:  All Goals achieved and Patient should continue to improve in reasonable time if they continue HEP. The patient demonstrates considerable improvement in cervical active range of motion as well as joint mobility at the cervical spine. PT updated and provided patient with home exercise program targeting posterior musculature to improve posture and postural endurance. This includes low back mid back scapular humeral and posterior cervical musculature. PT answered questions about the patient's CMC joints and made recommendations for a few simple hand strengthening exercises. The patient also mentioned that she experienced some intermittent knee pain when resuming her workouts at [employer]. PT encourage the patient to continue with exercises targeting the quadriceps like lateral heel taps Faroese split squats and standard squats. The patient was educated on signs and symptoms to look for when she needs to reduce intensity of exercise or take a break like excessive swelling at the knee or inability to kneel on the right knee. PT and patient discussed the future need for PT services and PT recommended following up with primary care to get prescription for any related ailments. She appears to have achieved majority of

## 2025-06-11 ENCOUNTER — OFFICE VISIT (OUTPATIENT)
Dept: INTERNAL MEDICINE CLINIC | Age: 55
End: 2025-06-11

## 2025-06-11 VITALS
SYSTOLIC BLOOD PRESSURE: 147 MMHG | BODY MASS INDEX: 24.72 KG/M2 | HEIGHT: 65 IN | HEART RATE: 85 BPM | WEIGHT: 148.4 LBS | OXYGEN SATURATION: 97 % | DIASTOLIC BLOOD PRESSURE: 105 MMHG

## 2025-06-11 DIAGNOSIS — I10 ESSENTIAL HYPERTENSION: Primary | ICD-10-CM

## 2025-06-11 DIAGNOSIS — R47.89 WORD FINDING DIFFICULTY: ICD-10-CM

## 2025-06-11 DIAGNOSIS — G43.109 MIGRAINE WITH AURA AND WITHOUT STATUS MIGRAINOSUS, NOT INTRACTABLE: ICD-10-CM

## 2025-06-11 RX ORDER — VERAPAMIL HYDROCHLORIDE 240 MG/1
240 TABLET, FILM COATED, EXTENDED RELEASE ORAL NIGHTLY
Qty: 30 TABLET | Refills: 3 | Status: SHIPPED | OUTPATIENT
Start: 2025-06-11

## 2025-06-11 RX ORDER — RIMEGEPANT SULFATE 75 MG/75MG
TABLET, ORALLY DISINTEGRATING ORAL
Qty: 8 TABLET | Refills: 1 | Status: SHIPPED | OUTPATIENT
Start: 2025-06-11

## 2025-06-11 RX ORDER — VERAPAMIL HYDROCHLORIDE 240 MG/1
240 TABLET, FILM COATED, EXTENDED RELEASE ORAL NIGHTLY
Qty: 30 TABLET | Refills: 3 | Status: SHIPPED | OUTPATIENT
Start: 2025-06-11 | End: 2025-06-11

## 2025-06-11 NOTE — PATIENT INSTRUCTIONS
Please check you blood pressure 2-3 times per week at different times of the day. Please bring the readings and your blood pressure cuff with you to your next appointment. Goal blood pressure is under 135/85, ideal is in the 120's/70's and below.

## 2025-06-11 NOTE — PROGRESS NOTES
mouth nightly      fluticasone (VERAMYST) 27.5 MCG/SPRAY nasal spray by Nasal route every morning      ibuprofen (ADVIL;MOTRIN) 200 MG tablet Take 1 tablet by mouth every 6 hours as needed for Pain       No current facility-administered medications for this visit.       Return for keep July appointment.

## 2025-06-12 PROBLEM — I10 ESSENTIAL HYPERTENSION: Status: ACTIVE | Noted: 2025-06-12

## 2025-06-12 PROBLEM — R47.89 WORD FINDING DIFFICULTY: Status: ACTIVE | Noted: 2025-06-12

## 2025-06-12 NOTE — ASSESSMENT & PLAN NOTE
Patient with more severe migraine that started about a week and a half ago that took approximately 1 week to fully resolve.  She had associated speech difficulties.  Trigger seems to have been weaning of verapamil (although she only missed 1 dose).  Patient likely also has underlying hypertension which would necessitate ongoing verapamil use.  Increase verapamil SR to 240 mg daily for improved blood pressure control.  Continue Ajovy.  Continue Nurtec as needed for rescue.  Discussed with patient that if she is having speech difficulties without headache she should consider taking Nurtec to see if the speech difficulties resolve as I suspect that her speech difficulties are atypical migraine in nature.

## 2025-06-12 NOTE — ASSESSMENT & PLAN NOTE
Patient likely has underlying hypertension, in part related to age and also genetics.  Increase verapamil SR to 240 mg daily.  If blood pressure is not well-controlled with increased dose of verapamil, will consider adding lisinopril or lisinopril HCT depending on her diastolic blood pressure.  Does need to continue verapamil long-term as it helps with her migraine prophylaxis.

## 2025-06-12 NOTE — ASSESSMENT & PLAN NOTE
Suspect that word-finding difficulty is an atypical symptom of her migraines as it seems to correlate with her migraine headaches.  Does not otherwise have difficulty with word finding.  No additional neurologic workup at this time unless her the symptoms progress.

## 2025-06-16 RX ORDER — VENLAFAXINE HYDROCHLORIDE 75 MG/1
CAPSULE, EXTENDED RELEASE ORAL DAILY
Qty: 90 CAPSULE | Refills: 3 | Status: SHIPPED | OUTPATIENT
Start: 2025-06-16

## 2025-07-01 ENCOUNTER — OFFICE VISIT (OUTPATIENT)
Dept: INTERNAL MEDICINE CLINIC | Age: 55
End: 2025-07-01

## 2025-07-01 VITALS
OXYGEN SATURATION: 96 % | SYSTOLIC BLOOD PRESSURE: 148 MMHG | HEIGHT: 65 IN | WEIGHT: 148.6 LBS | TEMPERATURE: 98.1 F | BODY MASS INDEX: 24.76 KG/M2 | RESPIRATION RATE: 12 BRPM | DIASTOLIC BLOOD PRESSURE: 102 MMHG | HEART RATE: 58 BPM

## 2025-07-01 DIAGNOSIS — I10 ESSENTIAL HYPERTENSION: ICD-10-CM

## 2025-07-01 DIAGNOSIS — R07.9 CHEST PAIN, UNSPECIFIED TYPE: Primary | ICD-10-CM

## 2025-07-01 PROBLEM — H61.21 IMPACTED CERUMEN OF RIGHT EAR: Status: RESOLVED | Noted: 2025-06-02 | Resolved: 2025-07-01

## 2025-07-01 PROBLEM — R03.0 ELEVATED BLOOD-PRESSURE READING WITHOUT DIAGNOSIS OF HYPERTENSION: Status: RESOLVED | Noted: 2025-06-02 | Resolved: 2025-07-01

## 2025-07-01 RX ORDER — LISINOPRIL AND HYDROCHLOROTHIAZIDE 20; 25 MG/1; MG/1
1 TABLET ORAL DAILY
Qty: 30 TABLET | Refills: 3 | Status: SHIPPED | OUTPATIENT
Start: 2025-07-01

## 2025-07-01 ASSESSMENT — ENCOUNTER SYMPTOMS
VOMITING: 0
SHORTNESS OF BREATH: 0
CONSTIPATION: 0
COUGH: 0
NAUSEA: 1
ABDOMINAL PAIN: 1

## 2025-07-01 NOTE — PATIENT INSTRUCTIONS
Take ASA 81 mg daily    Add lisinopril 20/25 mg daily    Schedule stress test after you return home from vacation    Monitor BP at home    Can try nexium or prilosec

## 2025-07-01 NOTE — PROGRESS NOTES
Office Visit   7/1/2025    Assessment and Plan:  Diagnoses and all orders for this visit:    Chest pain, unspecified type/midepigastric pain  -  Acute, new problem  -  Reports of mid-epigastric pain starting two days ago, accompanied by nausea, chest tightness, and occasional palpitations  -  Physical exam was normal-no chest wall tenderness or midepigastric pain upon palpation  -  EKG results were normal  -  Discussed addition of blood pressure medication and need for a stress test; advised to monitor for any worsening symptoms  -  Prescribed lisinopril/hydrochlorothiazide 20 mg/25 mg daily and baby aspirin 81 mg daily  -  Recommended over-the-counter Prilosec or Nexium for potential acid reflux  -  Order placed for stress test- patient will schedule when she returns from her trip.  -  Patient was instructed to watch out for worsening symptoms including but not limited to fevers, chills, lightheadedness, headache, vision changes, shortness of breath, chest pain, nausea, vomiting, bleeding, syncope etc. If their clinical condition worsened, patient was instructed to either call the office or go straight to the ED.  Patient expressed understanding and agrees with the plan.    Orders:  -     Exercise stress test; Future  -     EKG 12 lead; Future    Essential hypertension  -  /102  -  EKG is normal  -  Continue Verapamil 240 mg daily  -  Start Lisinopril-HCTZ 20-25 mg daily  -  Continue monitoring BP at home  -  Follow-up with regular provider on 7/29/2025    Orders  -     lisinopril-hydroCHLOROthiazide (PRINZIDE;ZESTORETIC) 20-25 MG per tablet; Take 1 tablet by mouth daily      Return for Keep appt with Dr. Sanchez on 7/29/2025.       Subjective:  Chief Complaint   Patient presents with    Chest Pain     Constant chest pain x 2 days   High BP reading 141/96 (this morning),   Highest reading 170/101 (1:15pm)        HPI:   Stefania Cross is a 55 y.o. female who presents to the clinic today for acute

## 2025-07-03 ASSESSMENT — ENCOUNTER SYMPTOMS: DIARRHEA: 0

## 2025-07-11 ENCOUNTER — HOSPITAL ENCOUNTER (OUTPATIENT)
Age: 55
Discharge: HOME | End: 2025-07-13
Payer: COMMERCIAL

## 2025-07-11 VITALS
HEIGHT: 65 IN | DIASTOLIC BLOOD PRESSURE: 76 MMHG | BODY MASS INDEX: 24.66 KG/M2 | HEART RATE: 100 BPM | WEIGHT: 148 LBS | SYSTOLIC BLOOD PRESSURE: 106 MMHG

## 2025-07-11 DIAGNOSIS — R07.9 CHEST PAIN, UNSPECIFIED TYPE: ICD-10-CM

## 2025-07-11 LAB
ECHO BSA: 1.75 M2
STRESS BASELINE DIAS BP: 76 MMHG
STRESS BASELINE HR: 101 BPM
STRESS BASELINE SYS BP: 106 MMHG
STRESS ESTIMATED WORKLOAD: 7 METS
STRESS EXERCISE DUR MIN: 6 MIN
STRESS EXERCISE DUR SEC: 0 SEC
STRESS O2 SAT PEAK: 94 %
STRESS O2 SAT REST: 98 %
STRESS PEAK DIAS BP: 84 MMHG
STRESS PEAK SYS BP: 177 MMHG
STRESS PERCENT HR ACHIEVED: 93 %
STRESS POST PEAK HR: 155 BPM
STRESS RATE PRESSURE PRODUCT: NORMAL BPM*MMHG
STRESS ST DEPRESSION: 1 MM
STRESS TARGET HR: 166 BPM

## 2025-07-11 PROCEDURE — 93016 CV STRESS TEST SUPVJ ONLY: CPT | Performed by: INTERNAL MEDICINE

## 2025-07-11 PROCEDURE — 93018 CV STRESS TEST I&R ONLY: CPT | Performed by: INTERNAL MEDICINE

## 2025-07-11 PROCEDURE — 93017 CV STRESS TEST TRACING ONLY: CPT

## 2025-07-14 DIAGNOSIS — G43.109 MIGRAINE WITH AURA AND WITHOUT STATUS MIGRAINOSUS, NOT INTRACTABLE: ICD-10-CM

## 2025-07-14 RX ORDER — FREMANEZUMAB-VFRM 225 MG/1.5ML
INJECTION SUBCUTANEOUS
Refills: 1 | OUTPATIENT
Start: 2025-07-14

## 2025-07-21 ENCOUNTER — HOSPITAL ENCOUNTER (OUTPATIENT)
Dept: CT IMAGING | Age: 55
Discharge: HOME | End: 2025-07-21
Payer: COMMERCIAL

## 2025-07-21 VITALS — DIASTOLIC BLOOD PRESSURE: 7 MMHG | SYSTOLIC BLOOD PRESSURE: 102 MMHG | HEART RATE: 64 BPM

## 2025-07-21 DIAGNOSIS — R07.9 CHEST PAIN, UNSPECIFIED TYPE: ICD-10-CM

## 2025-07-21 DIAGNOSIS — R94.39 ABNORMAL STRESS TEST: ICD-10-CM

## 2025-07-21 PROCEDURE — 6360000004 HC RX CONTRAST MEDICATION: Performed by: NURSE PRACTITIONER

## 2025-07-21 PROCEDURE — 75574 CT ANGIO HRT W/3D IMAGE: CPT

## 2025-07-21 RX ORDER — NITROGLYCERIN 0.4 MG/1
0.4 TABLET SUBLINGUAL PRN
Status: DISCONTINUED | OUTPATIENT
Start: 2025-07-21 | End: 2025-07-22 | Stop reason: HOSPADM

## 2025-07-21 RX ORDER — IOPAMIDOL 755 MG/ML
100 INJECTION, SOLUTION INTRAVASCULAR
Status: COMPLETED | OUTPATIENT
Start: 2025-07-21 | End: 2025-07-21

## 2025-07-21 RX ORDER — NITROGLYCERIN 0.4 MG/1
0.8 TABLET SUBLINGUAL PRN
Status: DISCONTINUED | OUTPATIENT
Start: 2025-07-21 | End: 2025-07-22 | Stop reason: HOSPADM

## 2025-07-21 RX ADMIN — IOPAMIDOL 100 ML: 755 INJECTION, SOLUTION INTRAVENOUS at 08:31

## 2025-07-29 ENCOUNTER — OFFICE VISIT (OUTPATIENT)
Dept: INTERNAL MEDICINE CLINIC | Age: 55
End: 2025-07-29
Payer: COMMERCIAL

## 2025-07-29 VITALS
BODY MASS INDEX: 25.26 KG/M2 | WEIGHT: 151.6 LBS | SYSTOLIC BLOOD PRESSURE: 100 MMHG | HEIGHT: 65 IN | DIASTOLIC BLOOD PRESSURE: 58 MMHG | OXYGEN SATURATION: 95 % | HEART RATE: 78 BPM

## 2025-07-29 DIAGNOSIS — R91.1 PULMONARY NODULE: ICD-10-CM

## 2025-07-29 DIAGNOSIS — Z86.0100 HISTORY OF COLON POLYPS: ICD-10-CM

## 2025-07-29 DIAGNOSIS — Z12.31 BREAST CANCER SCREENING BY MAMMOGRAM: ICD-10-CM

## 2025-07-29 DIAGNOSIS — Z12.11 COLON CANCER SCREENING: ICD-10-CM

## 2025-07-29 DIAGNOSIS — G43.109 MIGRAINE WITH AURA AND WITHOUT STATUS MIGRAINOSUS, NOT INTRACTABLE: ICD-10-CM

## 2025-07-29 DIAGNOSIS — I10 ESSENTIAL HYPERTENSION: Primary | ICD-10-CM

## 2025-07-29 DIAGNOSIS — J45.20 MILD INTERMITTENT ASTHMA WITHOUT COMPLICATION: ICD-10-CM

## 2025-07-29 PROCEDURE — 3078F DIAST BP <80 MM HG: CPT | Performed by: INTERNAL MEDICINE

## 2025-07-29 PROCEDURE — 3074F SYST BP LT 130 MM HG: CPT | Performed by: INTERNAL MEDICINE

## 2025-07-29 PROCEDURE — 99214 OFFICE O/P EST MOD 30 MIN: CPT | Performed by: INTERNAL MEDICINE

## 2025-07-29 RX ORDER — ALBUTEROL SULFATE 90 UG/1
2 INHALANT RESPIRATORY (INHALATION) EVERY 6 HOURS PRN
Qty: 18 G | Refills: 0 | Status: SHIPPED | OUTPATIENT
Start: 2025-07-29

## 2025-07-29 RX ORDER — LISINOPRIL AND HYDROCHLOROTHIAZIDE 10; 12.5 MG/1; MG/1
1 TABLET ORAL DAILY
Qty: 30 TABLET | Refills: 1 | Status: SHIPPED | OUTPATIENT
Start: 2025-07-29

## 2025-07-29 NOTE — ASSESSMENT & PLAN NOTE
Symptomatic for low blood pressure with fatigue.  Likely overtreated at this point in time.  Decrease lisinopril HCT from 20/25 mg daily to 10/12.5 mg daily.  Continue verapamil  mg daily.

## 2025-07-29 NOTE — ASSESSMENT & PLAN NOTE
Significantly improved.  Continue verapamil  mg daily and Ajovy.  Remains on Nurtec as needed for rescue.  Follows with Dr. Mcmullen for neurology.

## 2025-07-29 NOTE — ASSESSMENT & PLAN NOTE
Patient was found to have a 4 mm pulmonary nodule right lower lobe.  This was initially seen on CT of abdomen pelvis in September 2019.  She is a lifelong non-smoker.  Repeat CT again in 1 year.

## 2025-07-29 NOTE — PROGRESS NOTES
Patient: Stefania Cross is a 55 y.o. female who presents today with the following Chief Complaint(s):  Chief Complaint   Patient presents with    Follow-up     6-8 weeks    Hypertension    Headache       HPI    Here today for follow up.    Was having left sided chest pain. Had negative coronary artery CT scan.    HTN- BP has been running 99/55.  - taking Verapamil  mg qd and lisinopril HCT 20/25 mg qd.  - takes lisinopril in am/verapamil at HS.    Migraines- none    Asking for refill on albuterol. Noticed that she felt \"winded\" after stress test.  - exercising twice weekly.     Coronary artery CT 7/21/25:  IMPRESSION:   1.  Left circumflex coronary artery dominance.  2.  Diminutive right coronary artery throughout, likely congenital.  3.  No evidence of coronary artery disease.  4.  Right middle lobe 4 mm anterior nodule. Optional follow-up chest CT in 12  months for patients at high risk based on current guidelines.    Colonoscopy 2017  Dr. Powers.  - has been unsuccessful at getting r/s d/t work (and being cancelled once by GI)    Had heart burn. Better with OTC Prilosec.    Has had a lot of work stress. Planning a trip to [place] in August to help with stress relief.     Allergies   Allergen Reactions    Zithromax [Azithromycin] Rash      Past Medical History:   Diagnosis Date    Allergic rhinitis     Asthma     Irritable bowel syndrome     Kidney stone     Migraine       Past Surgical History:   Procedure Laterality Date    HEMORRHOID SURGERY  2017    LITHOTRIPSY      x 2    OTHER SURGICAL HISTORY      anal fissure repair    PELVIC LAPAROSCOPY      endometriosis      Social History     Socioeconomic History    Marital status:      Spouse name: Not on file    Number of children: Not on file    Years of education: Not on file    Highest education level: Not on file   Occupational History    Not on file   Tobacco Use    Smoking status: Never    Smokeless tobacco: Never   Vaping Use    Vaping status:

## 2025-07-30 ENCOUNTER — PATIENT MESSAGE (OUTPATIENT)
Dept: INTERNAL MEDICINE CLINIC | Age: 55
End: 2025-07-30

## 2025-07-30 DIAGNOSIS — R07.9 CHEST PAIN, UNSPECIFIED TYPE: ICD-10-CM

## 2025-07-30 DIAGNOSIS — I10 ESSENTIAL HYPERTENSION: ICD-10-CM

## 2025-07-30 DIAGNOSIS — Q24.5 ANOMALOUS RIGHT CORONARY ARTERY: Primary | ICD-10-CM

## 2025-07-30 DIAGNOSIS — E78.5 HYPERLIPIDEMIA LDL GOAL <70: ICD-10-CM

## 2025-07-30 RX ORDER — ROSUVASTATIN CALCIUM 10 MG/1
10 TABLET, COATED ORAL DAILY
Qty: 30 TABLET | Refills: 5 | Status: SHIPPED | OUTPATIENT
Start: 2025-07-30 | End: 2025-08-01

## 2025-07-31 ENCOUNTER — TELEPHONE (OUTPATIENT)
Dept: CARDIOLOGY CLINIC | Age: 55
End: 2025-07-31

## 2025-07-31 NOTE — PROGRESS NOTES
[facility]  Cardiology Note  [phone]      Chief Complaint   Patient presents with    New Patient     Having some chest pain that comes and goes. Concerns with bp.     Hypertension        History of Present Illness:  Stefania Cross is a 55 y.o. patient PMHx hypertension and migraines.    Patient reported mid-epigastric pain, accompanied by nausea, chest tightness, and occasional palpitations to her PCP on 7/1/25. Lisinopril/hydrochlorothiazide 20 mg/25 mg daily, aspirin 81 mg and OTC PPI added, in addition to verapamil 240 nightly. Exercise stress test, equivocal for ischemia. CCTA showed a calcium score of 0 and diminutive right coronary artery throughout, likely congenital.     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 Ermelinda Sanchez. I have been asked to provide consultation regarding further management and testing.    Today, she reports episodes of chest pain. Describes the pain as a hurting type pain. Pain comes on slowly, lasts for a couple hours, then subsides. Episodes present with working/typing. Also, reports twice weekly episodes of feeling like her hurt is pumping hard.     She suffers from migraines, follows with Neurology. Has been on and off verapamil for treatment. -170's, lisinopril/hctz started, SBP decreased to 90's. Felt tired and was sleeping a lot.     She has a stressful job. Planning a trip to [place] for a few days to find seashells and relax.     Past Medical History:   has a past medical history of Allergic rhinitis, Asthma, Irritable bowel syndrome, Kidney stone, and Migraine.    Surgical History:   has a past surgical history that includes Hemorrhoid surgery (2017); Lithotripsy; other surgical history; and pelvic laparoscopy.     Social History:   reports that she has never smoked. She has never been exposed to tobacco smoke. She has never used smokeless tobacco. She reports

## 2025-07-31 NOTE — TELEPHONE ENCOUNTER
Patient was referred by Ermelinda Sanchez to the [facility] location with Dr. Lawrence.  Patient has been scheduled for 8/1/25 at 8am.  Patient verbalized understanding of appointment instructions.  Call complete.

## 2025-08-01 ENCOUNTER — OFFICE VISIT (OUTPATIENT)
Dept: CARDIOLOGY CLINIC | Age: 55
End: 2025-08-01
Payer: COMMERCIAL

## 2025-08-01 VITALS
HEART RATE: 79 BPM | WEIGHT: 151.2 LBS | DIASTOLIC BLOOD PRESSURE: 74 MMHG | OXYGEN SATURATION: 95 % | SYSTOLIC BLOOD PRESSURE: 102 MMHG | HEIGHT: 65 IN | BODY MASS INDEX: 25.19 KG/M2

## 2025-08-01 DIAGNOSIS — I10 ESSENTIAL HYPERTENSION: Primary | ICD-10-CM

## 2025-08-01 DIAGNOSIS — R07.9 CHEST PAIN, UNSPECIFIED TYPE: ICD-10-CM

## 2025-08-01 DIAGNOSIS — R94.31 ABNORMAL ECG DURING EXERCISE STRESS TEST: ICD-10-CM

## 2025-08-01 DIAGNOSIS — Q24.5 ANOMALOUS RIGHT CORONARY ARTERY: ICD-10-CM

## 2025-08-01 DIAGNOSIS — R00.2 PALPITATIONS: ICD-10-CM

## 2025-08-01 DIAGNOSIS — E78.2 MIXED HYPERLIPIDEMIA: ICD-10-CM

## 2025-08-01 DIAGNOSIS — R93.1 ABNORMAL COMPUTED TOMOGRAPHY ANGIOGRAPHY OF HEART: ICD-10-CM

## 2025-08-01 PROCEDURE — 3074F SYST BP LT 130 MM HG: CPT | Performed by: INTERNAL MEDICINE

## 2025-08-01 PROCEDURE — G2211 COMPLEX E/M VISIT ADD ON: HCPCS | Performed by: INTERNAL MEDICINE

## 2025-08-01 PROCEDURE — 93000 ELECTROCARDIOGRAM COMPLETE: CPT | Performed by: INTERNAL MEDICINE

## 2025-08-01 PROCEDURE — 99204 OFFICE O/P NEW MOD 45 MIN: CPT | Performed by: INTERNAL MEDICINE

## 2025-08-01 PROCEDURE — 3078F DIAST BP <80 MM HG: CPT | Performed by: INTERNAL MEDICINE

## 2025-08-01 NOTE — PATIENT INSTRUCTIONS
- You have normal coronary anatomy, but LAD and RCA are small and have abnormal courses.    - Stop Verapamil    - Start Lisinopril-hctz    - Schedule an Echocardiogram soon. This is an ultrasound to evaluate the function and structure of your heart.    - Wear Holter monitor for 2 weeks    - Left Heart Cath Patient Pre-procedure Instructions    Do NOT eat or drink anything after midnight morning of procedure    MEDICATIONS:  Your medications have been reviewed. Please follow medication instructions below  It is okay to drink a sip of water with meds morning of procedure  Diuretics- Hold diuretics (water pill) for comfort morning of procedure. Your diuretics to hold: Hydrochlorothiazide    Transportation: Bring someone to drive you home.     Scheduling: Clara JOHNS is our full time procedure . She will call you to schedule your procedure.    Allergies: Do you have an allergy to dye or contrast? No.    Labs: We need to check blood work within 30 days of your procedure (CBC & BMP). Please go to any [facility] lab to get your labs drawn prior to your procedure.

## 2025-08-12 ENCOUNTER — ANCILLARY PROCEDURE (OUTPATIENT)
Dept: CARDIOLOGY CLINIC | Age: 55
End: 2025-08-12

## 2025-08-12 DIAGNOSIS — R00.2 PALPITATIONS: ICD-10-CM

## 2025-08-15 ENCOUNTER — OFFICE VISIT (OUTPATIENT)
Dept: INTERNAL MEDICINE CLINIC | Age: 55
End: 2025-08-15

## 2025-08-15 VITALS
HEIGHT: 65 IN | WEIGHT: 153.2 LBS | OXYGEN SATURATION: 95 % | SYSTOLIC BLOOD PRESSURE: 118 MMHG | HEART RATE: 95 BPM | BODY MASS INDEX: 25.52 KG/M2 | DIASTOLIC BLOOD PRESSURE: 74 MMHG
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
## 2025-08-15 DIAGNOSIS — I10 ESSENTIAL HYPERTENSION: ICD-10-CM

## 2025-08-15 RX ORDER — METHYLPREDNISOLONE ACETATE 40 MG/ML
40 INJECTION, SUSPENSION INTRA-ARTICULAR; INTRALESIONAL; INTRAMUSCULAR; SOFT TISSUE ONCE
Qty: 1 ML | Refills: 0
Start: 2025-08-15 | End: 2025-08-15

## 2025-08-15 RX ORDER — METHYLPREDNISOLONE ACETATE 40 MG/ML
40 INJECTION, SUSPENSION INTRA-ARTICULAR; INTRALESIONAL; INTRAMUSCULAR; SOFT TISSUE ONCE
Status: COMPLETED | OUTPATIENT
Start: 2025-08-15 | End: 2025-08-15

## 2025-08-15 RX ADMIN — METHYLPREDNISOLONE ACETATE 40 MG: 40 INJECTION, SUSPENSION INTRA-ARTICULAR; INTRALESIONAL; INTRAMUSCULAR; SOFT TISSUE at 16:41

## 2025-08-25 RX ORDER — LISINOPRIL AND HYDROCHLOROTHIAZIDE 10; 12.5 MG/1; MG/1
1 TABLET ORAL DAILY
Qty: 90 TABLET | Refills: 3 | Status: SHIPPED | OUTPATIENT
Start: 2025-08-25

## 2025-08-28 PROBLEM — Z12.11 COLON CANCER SCREENING: Status: RESOLVED | Noted: 2024-02-14 | Resolved: 2025-08-28
